# Patient Record
Sex: FEMALE | Race: BLACK OR AFRICAN AMERICAN | NOT HISPANIC OR LATINO | Employment: PART TIME | URBAN - METROPOLITAN AREA
[De-identification: names, ages, dates, MRNs, and addresses within clinical notes are randomized per-mention and may not be internally consistent; named-entity substitution may affect disease eponyms.]

---

## 2017-02-24 ENCOUNTER — HOSPITAL ENCOUNTER (EMERGENCY)
Facility: HOSPITAL | Age: 42
Discharge: HOME | End: 2017-02-24
Attending: EMERGENCY MEDICINE
Payer: MEDICARE

## 2017-02-24 VITALS
HEART RATE: 85 BPM | HEIGHT: 72 IN | TEMPERATURE: 99 F | OXYGEN SATURATION: 100 % | SYSTOLIC BLOOD PRESSURE: 139 MMHG | DIASTOLIC BLOOD PRESSURE: 77 MMHG | WEIGHT: 293 LBS | BODY MASS INDEX: 39.68 KG/M2 | RESPIRATION RATE: 20 BRPM

## 2017-02-24 DIAGNOSIS — S13.4XXA WHIPLASH INJURIES, INITIAL ENCOUNTER: Primary | ICD-10-CM

## 2017-02-24 LAB
B-HCG UR QL: NEGATIVE
CTP QC/QA: YES

## 2017-02-24 PROCEDURE — 81025 URINE PREGNANCY TEST: CPT | Performed by: EMERGENCY MEDICINE

## 2017-02-24 PROCEDURE — 25000003 PHARM REV CODE 250: Performed by: EMERGENCY MEDICINE

## 2017-02-24 PROCEDURE — 99283 EMERGENCY DEPT VISIT LOW MDM: CPT | Mod: 25

## 2017-02-24 RX ORDER — METHOCARBAMOL 750 MG/1
1500 TABLET, FILM COATED ORAL 3 TIMES DAILY
Qty: 30 TABLET | Refills: 0 | Status: SHIPPED | OUTPATIENT
Start: 2017-02-24 | End: 2017-03-01

## 2017-02-24 RX ORDER — IBUPROFEN 800 MG/1
800 TABLET ORAL EVERY 6 HOURS PRN
Qty: 20 TABLET | Refills: 0 | Status: SHIPPED | OUTPATIENT
Start: 2017-02-24 | End: 2018-01-08 | Stop reason: ALTCHOICE

## 2017-02-24 RX ORDER — IBUPROFEN 400 MG/1
800 TABLET ORAL
Status: COMPLETED | OUTPATIENT
Start: 2017-02-24 | End: 2017-02-24

## 2017-02-24 RX ADMIN — IBUPROFEN 800 MG: 400 TABLET, FILM COATED ORAL at 05:02
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
## 2017-03-13 ENCOUNTER — HOSPITAL ENCOUNTER (OUTPATIENT)
Dept: RADIOLOGY | Facility: HOSPITAL | Age: 42
Discharge: HOME | End: 2017-03-13
Attending: OBSTETRICS & GYNECOLOGY
Payer: MEDICARE

## 2017-03-13 ENCOUNTER — OFFICE VISIT (OUTPATIENT)
Dept: OBSTETRICS AND GYNECOLOGY | Facility: CLINIC | Age: 42
End: 2017-03-13
Payer: MEDICARE

## 2017-03-13 VITALS
DIASTOLIC BLOOD PRESSURE: 78 MMHG | SYSTOLIC BLOOD PRESSURE: 120 MMHG | WEIGHT: 293 LBS | BODY MASS INDEX: 39.68 KG/M2 | HEIGHT: 72 IN

## 2017-03-13 DIAGNOSIS — Z12.31 VISIT FOR SCREENING MAMMOGRAM: ICD-10-CM

## 2017-03-13 DIAGNOSIS — Z01.419 ROUTINE GYNECOLOGICAL EXAMINATION: Primary | ICD-10-CM

## 2017-03-13 DIAGNOSIS — Z01.419 ROUTINE GYNECOLOGICAL EXAMINATION: ICD-10-CM

## 2017-03-13 PROCEDURE — 3074F SYST BP LT 130 MM HG: CPT | Mod: S$GLB,,, | Performed by: OBSTETRICS & GYNECOLOGY

## 2017-03-13 PROCEDURE — 87591 N.GONORRHOEAE DNA AMP PROB: CPT

## 2017-03-13 PROCEDURE — 77067 SCR MAMMO BI INCL CAD: CPT | Mod: 26,,, | Performed by: RADIOLOGY

## 2017-03-13 PROCEDURE — 99396 PREV VISIT EST AGE 40-64: CPT | Mod: S$GLB,,, | Performed by: OBSTETRICS & GYNECOLOGY

## 2017-03-13 PROCEDURE — 99999 PR PBB SHADOW E&M-EST. PATIENT-LVL II: CPT | Mod: PBBFAC,,, | Performed by: OBSTETRICS & GYNECOLOGY

## 2017-03-13 PROCEDURE — 77067 SCR MAMMO BI INCL CAD: CPT | Mod: TC

## 2017-03-13 PROCEDURE — 3078F DIAST BP <80 MM HG: CPT | Mod: S$GLB,,, | Performed by: OBSTETRICS & GYNECOLOGY

## 2017-03-13 NOTE — MR AVS SNAPSHOT
[facility]  [address]  5th [address] [facility], [address]  [place] [address]  Phone: [phone]                  Leticia Law   3/13/2017 8:15 AM   Office Visit    Description:  Female : 1975   Provider:  Kelsey Kwon MD   Department:  [facility]           Diagnoses this Visit        Comments    Routine gynecological examination    -  Primary     Visit for screening mammogram                To Do List           Future Appointments        Provider Department Dept Phone    3/13/2017 10:30 AM APPOINTMENT LAB, [facility] [phone]      Goals (5 Years of Data)     None      [facility]     [facility] Care [facility] -  Assistance  Registered nurses in the [facility] provide clinical advisement, health education, appointment booking, and other advisory services.  Call for this free service at [phone].             Medications           Message regarding Medications     Verify the changes and/or additions to your medication regime listed below are the same as discussed with your clinician today.  If any of these changes or additions are incorrect, please notify your healthcare provider.             Verify that the below list of medications is an accurate representation of the medications you are currently taking.  If none reported, the list may be blank. If incorrect, please contact your healthcare provider. Carry this list with you in case of emergency.           Current Medications     ibuprofen (ADVIL,MOTRIN) 800 MG tablet Take 1 tablet (800 mg total) by mouth every 6 (six) hours as needed for Pain.    metformin (FORTAMET) 500 mg 24 hr tablet Take 500 mg by mouth 2 (two) times daily with meals.           Clinical Reference Information           Your Vitals Were     BP Height Weight Last Period BMI    120/78 6' (1.829 m) 174.3 kg (384 lb 4.2 oz) 2017 52.12 kg/m2      Blood Pressure          Most Recent Value     BP  120/78      Allergies as of 3/13/2017     No Known Allergies      Immunizations Administered on Date of Encounter - 3/13/2017     None      Orders Placed During Today's Visit      Normal Orders This Visit    C. trachomatis/N. gonorrhoeae by AMP DNA Cervix     Future Labs/Procedures Expected by Expires    HIV-1 and HIV-2 antibodies  3/13/2017 3/13/2018    Mammo Digital Screening Bilat with Tomosynthesis CAD  3/13/2017 5/12/2018      [facility] Sign-Up     Activating your [facility] account is as easy as 1-2-3!     1) Visit [email or web address], select Sign Up Now, enter this activation code and your date of birth, then select Next.  [email or web address]  Expires: 4/10/2017  5:56 PM      2) Create a username and password to use when you visit [facility] in the future and select a security question in case you lose your password and select Next.    3) Enter your e-mail address and click Sign Up!    Additional Information  If you have questions, please e-mail [email or web address] or call [phone] to talk to our [facility] staff. Remember, [facility] is NOT to be used for urgent needs. For medical emergencies, dial 911.         Language Assistance Services     ATTENTION: Language assistance services are available, free of charge. Please call [phone].      ATENCIÓN: Si habla español, tiene a araujo disposición servicios gratuitos de asistencia lingüística. Llame al [phone].     CHÚ Ý: N?u b?n nói Ti?ng Vi?t, có các d?ch v? h? tr? ngôn ng? mi?n phí dành cho b?n. G?i s? [phone].         [facility] complies with applicable Federal civil rights laws and does not discriminate on the basis of race, color, national origin, age, disability, or sex.

## 2017-03-13 NOTE — PROGRESS NOTES
43 yo female who presents for routine gyn visit.  Complains of pain in her RLQ when she gets up in the am.  Hard to get out of bed.  Pain goes away by the end of the day.  Reg cycles q month. No complaints.  Reports new sex partner, one male partner, +condom use - since her last visit. Desires STD testing today.  PMH significant for DM, HTN, possible thyroid problem, and high cholesterol. Patient on medications for all - but does not remember the names or doses of the medications.    ROS:  GENERAL: Denies weight gain or weight loss. Feeling well overall.   SKIN: Denies rash or lesions.   HEAD: Denies head injury or headache.   CHEST: Denies chest pain or shortness of breath.   CARDIOVASCULAR: Denies palpitations or left sided chest pain.   ABDOMEN: No abdominal pain, constipation, diarrhea, nausea, vomiting or rectal bleeding.   URINARY: No frequency, dysuria, hematuria, or burning on urination.  REPRODUCTIVE: See HPI.   BREASTS:  denies pain, lumps, or nipple discharge.   HEMATOLOGIC: No easy bruisability or excessive bleeding.   MUSCULOSKELETAL: Denies joint pain or swelling.   NEUROLOGIC: Denies syncope or weakness.   PSYCHIATRIC: Denies depression, anxiety or mood swings.         PE:   /78  Ht 6' (1.829 m)  Wt (!) 174.3 kg (384 lb 4.2 oz)  LMP 02/17/2017  BMI 52.12 kg/m2  APPEARANCE: Well nourished, well developed, in no acute distress.  SKIN: Normal skin turgor, no lesions.  CHEST: Lungs clear to auscultation.  HEART: Regular rate and rhythm, no murmurs, rubs or gallops.  ABDOMEN: Soft. No tenderness or masses. No hepatosplenomegaly. No hernias. Obese  BREASTS: Symmetrical, no skin changes or visible lesions. No palpable masses, nipple discharge or adenopathy bilaterally.  PELVIC: Normal external female genitalia without lesions. Normal hair distribution. Adequate perineal body, normal urethral meatus. Vagina moist and well rugated without lesions or discharge. Cervix pink and without lesions. No  significant cystocele or rectocele. Bimanual exam showed uterus normal size, shape, position, mobile and nontender. Adnexa without masses or tenderness. Urethra and bladder normal.  EXTREMITIES: No clubbing cyanosis or edema.      AP  1) Routine gyn  -s/p normal breast exam: mammogram ordered  -pap and HPV from 2015 wnl  -STD testing. Gc/chl and HIV ordered  -contraception: s/p BTL    Patient encouraged to bring list of medications at her next visit so that we can update her chart.  DAVE Kwon MD

## 2017-03-14 LAB
C TRACH DNA SPEC QL NAA+PROBE: NOT DETECTED
N GONORRHOEA DNA SPEC QL NAA+PROBE: NOT DETECTED

## 2018-01-08 ENCOUNTER — HOSPITAL ENCOUNTER (EMERGENCY)
Facility: HOSPITAL | Age: 43
Discharge: HOME | End: 2018-01-08
Attending: EMERGENCY MEDICINE
Payer: MEDICARE

## 2018-01-08 VITALS
DIASTOLIC BLOOD PRESSURE: 77 MMHG | BODY MASS INDEX: 39.68 KG/M2 | HEIGHT: 72 IN | RESPIRATION RATE: 18 BRPM | OXYGEN SATURATION: 98 % | WEIGHT: 293 LBS | TEMPERATURE: 98 F | SYSTOLIC BLOOD PRESSURE: 154 MMHG | HEART RATE: 83 BPM

## 2018-01-08 DIAGNOSIS — M25.552 LEFT HIP PAIN: Primary | ICD-10-CM

## 2018-01-08 PROCEDURE — 96372 THER/PROPH/DIAG INJ SC/IM: CPT

## 2018-01-08 PROCEDURE — 63600175 PHARM REV CODE 636 W HCPCS: Performed by: EMERGENCY MEDICINE

## 2018-01-08 PROCEDURE — 99283 EMERGENCY DEPT VISIT LOW MDM: CPT | Mod: 25

## 2018-01-08 RX ORDER — KETOROLAC TROMETHAMINE 30 MG/ML
30 INJECTION, SOLUTION INTRAMUSCULAR; INTRAVENOUS
Status: COMPLETED | OUTPATIENT
Start: 2018-01-08 | End: 2018-01-08

## 2018-01-08 RX ORDER — NAPROXEN 500 MG/1
500 TABLET ORAL 2 TIMES DAILY PRN
Qty: 30 TABLET | Refills: 0 | Status: SHIPPED | OUTPATIENT
Start: 2018-01-08 | End: 2018-04-04

## 2018-01-08 RX ADMIN — KETOROLAC TROMETHAMINE 30 MG: 30 INJECTION, SOLUTION INTRAMUSCULAR at 07:01

## 2018-01-08 NOTE — ED PROVIDER NOTES
Encounter Date: 2018    SCRIBE #1 NOTE: I, Grace Alicea, am scribing for, and in the presence of,  Dr. Cavanaugh. I have scribed the entire note.     I, Dr. Monik Cavanaugh MD, personally performed the services described in this documentation. All medical record entries made by the scribe were at my direction and in my presence.  I have reviewed the chart and agree that the record reflects my personal performance and is accurate and complete. Monik Cavanaugh MD.    History     Chief Complaint   Patient presents with    Hip Pain     left hip pain X 1 week. States pain is worse with walking or lying on left side.      CHIEF COMPLAINT: Patient presents with:  Hip Pain: left hip pain X 1 week. States pain is worse with walking or lying on left side.       HISTORY OF PRESENT ILLNESS: Leticia Law who is a 42 y.o. presents to the emergency department today with complaint of left hip pain for a week. Pt states that pain was intermittent at first but has become constant for the last few days. Pain is worse with standing or walking. Pain does not radiate. She denies any recent heavy lifting. She denies any shortness of breath, chest pain, fever, nausea, vomiting or changes in urination or BM.     ALLERGIES REVIEWED  MEDICATIONS REVIEWED  PMH/PSH/SOC/FH REVIEWED     The history is provided by the patient.    Nursing/Ancillary staff note reviewed.          The history is provided by the patient.     Review of patient's allergies indicates:  No Known Allergies  Past Medical History:   Diagnosis Date    Depression     Diabetes mellitus     Hyperlipidemia     Hypertension     Thyroid disease     possible hypthyroid disease     Past Surgical History:   Procedure Laterality Date     SECTION      TUBAL LIGATION       No family history on file.  Social History   Substance Use Topics    Smoking status: Never Smoker    Smokeless tobacco: Not on file    Alcohol use No     Review of Systems   Constitutional: Negative  for activity change, chills, diaphoresis and fever.   HENT: Negative for congestion, ear discharge, facial swelling, rhinorrhea, sinus pain, sore throat, trouble swallowing and voice change.    Eyes: Negative for pain, discharge and visual disturbance.   Respiratory: Negative for cough, chest tightness, shortness of breath and wheezing.    Cardiovascular: Negative for chest pain, palpitations and leg swelling.   Gastrointestinal: Negative for abdominal pain, constipation, diarrhea and vomiting.   Genitourinary: Negative for flank pain, frequency and urgency.   Musculoskeletal: Positive for arthralgias. Negative for joint swelling, neck pain and neck stiffness.   Skin: Negative for color change and pallor.   Neurological: Negative for dizziness, weakness, light-headedness and headaches.   All other systems reviewed and are negative.      Physical Exam     Initial Vitals [01/08/18 0512]   BP Pulse Resp Temp SpO2   (!) 157/79 88 15 97.9 °F (36.6 °C) 96 %      MAP       105         Physical Exam    Nursing note and vitals reviewed.  Constitutional: She appears well-developed and well-nourished. She is not diaphoretic. She is Obese . No distress.   HENT:   Head: Normocephalic and atraumatic.   Right Ear: External ear normal.   Left Ear: External ear normal.   Nose: Nose normal.   Mouth/Throat: Oropharynx is clear and moist. No oropharyngeal exudate.   Eyes: Conjunctivae and EOM are normal. Pupils are equal, round, and reactive to light. Right eye exhibits no discharge. Left eye exhibits no discharge.   Neck: Normal range of motion. Neck supple.   Cardiovascular: Normal rate, regular rhythm, normal heart sounds and intact distal pulses. Exam reveals no gallop and no friction rub.    No murmur heard.  Pulmonary/Chest: Breath sounds normal. No stridor. No respiratory distress. She has no wheezes. She has no rales. She exhibits no tenderness.   Abdominal: Soft. Bowel sounds are normal. She exhibits no distension and no mass.  There is no tenderness. There is no rebound and no guarding.   Musculoskeletal: Normal range of motion. She exhibits no edema or tenderness.   Lymphadenopathy:     She has no cervical adenopathy.   Neurological: She is alert and oriented to person, place, and time. She has normal strength. No cranial nerve deficit.   Skin: Skin is warm and dry. Capillary refill takes less than 2 seconds. No rash noted. No erythema. No pallor.   Psychiatric: She has a normal mood and affect. Thought content normal.         ED Course   Procedures  Labs Reviewed - No data to display          Medical Decision Making:   History:   Old Medical Records: I decided to obtain old medical records.  Differential Diagnosis:   Strain, sprain, fracture, dislocation.   ED Management:  42 y.o. Female presents to the ED with non traumatic left hip pain, worse with weather change and movement, likely to be arthritis, low suspicion of infectious etilolgy. Pt was able to walk around the ED room, not likely to be fracture. Will treat pain with anti-inflammatory and instruct pt to follow up with PCP in pain does not subside. After taking into careful account the historical factors and physical exam findings of the patient's presentation today, in conjunction with the empirical and objective data obtained on ED workup, no acute emergent medical condition has been identified. The patient appears to be low risk for an emergent medical condition and I feel it is safe and appropriate at this time for the patient to be discharged to follow-up as detailed in their discharge instructions for reevaluation and possible continued outpatient workup and management. I have discussed the specifics of the workup with the patient and the patient has verbalized understanding of the details of the workup, the diagnosis, the treatment plan, and the need for outpatient follow-up.  Although the patient has no emergent etiology today this does not preclude the development of an  emergent condition so in addition, I have advised the patient that they can return to the ED and/or activate EMS at any time with worsening of their symptoms, change of their symptoms, or with any other medical complaint.  The patient remained comfortable and stable during their visit in the ED.  Discharge and follow-up instructions discussed with the patient who expressed understanding and willingness to comply with my recommendations.                   ED Course    Patient improved with treatment in the emergency department and comfortable going home. Discussed reasons to return and importance of followup.  Patient understands that the emergency visit today is primarily to address immediate concerns and to rule out emergent cause of symptoms and that they may require further workup and evaluation as an outpatient. All questions addressed and patient given discharge instructions and followup information.     Clinical Impression:   The encounter diagnosis was Left hip pain.    Disposition:   Disposition: Discharged  Condition: Stable                        Monik Cavanaugh MD  02/02/18 1049

## 2018-01-08 NOTE — ED TRIAGE NOTES
Pt reports left hip pain that worsens with weigh bearing and activity x 1 week. No acute distress noted. Pt ambulatory with steady gait to room. Pt reports walking slower than normal due to discomfort in left hip. Pt deneis injury at this time.

## 2018-03-22 ENCOUNTER — TELEPHONE (OUTPATIENT)
Dept: OBSTETRICS AND GYNECOLOGY | Facility: CLINIC | Age: 43
End: 2018-03-22

## 2018-03-22 NOTE — TELEPHONE ENCOUNTER
Spoke with pt. And told her she will get her mammogram order at her annual visit on 04/2018, she said okay.

## 2018-04-04 ENCOUNTER — OFFICE VISIT (OUTPATIENT)
Dept: OBSTETRICS AND GYNECOLOGY | Facility: CLINIC | Age: 43
End: 2018-04-04
Payer: MEDICARE

## 2018-04-04 ENCOUNTER — LAB VISIT (OUTPATIENT)
Dept: LAB | Facility: HOSPITAL | Age: 43
End: 2018-04-04
Attending: OBSTETRICS & GYNECOLOGY
Payer: MEDICARE

## 2018-04-04 VITALS
HEIGHT: 72 IN | SYSTOLIC BLOOD PRESSURE: 136 MMHG | DIASTOLIC BLOOD PRESSURE: 78 MMHG | WEIGHT: 293 LBS | BODY MASS INDEX: 39.68 KG/M2

## 2018-04-04 DIAGNOSIS — Z01.419 ENCOUNTER FOR GYNECOLOGICAL EXAMINATION WITHOUT ABNORMAL FINDING: Primary | ICD-10-CM

## 2018-04-04 DIAGNOSIS — Z01.419 ENCOUNTER FOR GYNECOLOGICAL EXAMINATION WITHOUT ABNORMAL FINDING: ICD-10-CM

## 2018-04-04 DIAGNOSIS — Z12.39 SCREENING BREAST EXAMINATION: ICD-10-CM

## 2018-04-04 DIAGNOSIS — Z12.4 CERVICAL CANCER SCREENING: ICD-10-CM

## 2018-04-04 PROCEDURE — G0101 CA SCREEN;PELVIC/BREAST EXAM: HCPCS | Mod: S$GLB,,, | Performed by: OBSTETRICS & GYNECOLOGY

## 2018-04-04 PROCEDURE — 88142 CYTOPATH C/V THIN LAYER: CPT

## 2018-04-04 PROCEDURE — 87624 HPV HI-RISK TYP POOLED RSLT: CPT

## 2018-04-04 PROCEDURE — 99999 PR PBB SHADOW E&M-EST. PATIENT-LVL III: CPT | Mod: PBBFAC,,, | Performed by: OBSTETRICS & GYNECOLOGY

## 2018-04-04 PROCEDURE — 36415 COLL VENOUS BLD VENIPUNCTURE: CPT

## 2018-04-04 PROCEDURE — 87491 CHLMYD TRACH DNA AMP PROBE: CPT

## 2018-04-04 PROCEDURE — 86703 HIV-1/HIV-2 1 RESULT ANTBDY: CPT

## 2018-04-04 NOTE — MEDICAL/APP STUDENT
44 yo  female presenting for routine gyn visit.  Patient has no gyn complaints at this time. Cycles come q month and last about 3-4 days.  Patient is s/p BTL and is interested in reversing the BTL in order to conceive.  Patient is sexually active with one male partner without condom use. Patient requests chl/pillo and HIV testing.  Patient had pap test with HPV cotesting 11/3/15 wnl. Patient requests pap test today.  Patient last had mammogram 3/13/17 which was negative and will have another mammogram at today's visit.    ROS:  GENERAL: Denies weight gain or weight loss. Feeling well overall.   SKIN: Denies rash or lesions.   HEAD: Denies head injury or headache.   CHEST: Denies chest pain or shortness of breath.   CARDIOVASCULAR: Denies palpitations or left sided chest pain.   ABDOMEN: No abdominal pain, constipation, diarrhea, nausea, vomiting or rectal bleeding.   URINARY: No frequency, dysuria, hematuria, or burning on urination.  REPRODUCTIVE: See HPI.   BREASTS: Denies pain, lumps, or nipple discharge.   HEMATOLOGIC: No easy bruisability or excessive bleeding.   MUSCULOSKELETAL: Positive for bilateral knee pain.  NEUROLOGIC: Denies syncope or weakness.   PSYCHIATRIC: Denies depression, anxiety or mood swings.     PE:   Vitals: /78 (BP Method: Large (Manual))   Ht 6' (1.829 m)   Wt (!) 176.7 kg (389 lb 8.9 oz)   LMP 2018 (Exact Date)   BMI 52.83 kg/m²   APPEARANCE: Well nourished, well developed, in no acute distress.  SKIN: Normal skin turgor, no lesions.  CHEST: Lungs clear to auscultation.  HEART: Regular rate and rhythm, no murmurs, rubs or gallops.  ABDOMEN: Soft. No tenderness or masses. No hepatosplenomegaly. No hernias.  BREASTS: Symmetrical, no skin changes or visible lesions. No palpable masses, nipple discharge or adenopathy bilaterally.  PELVIC: Normal external female genitalia without lesions. Normal hair distribution. Adequate perineal body, normal urethral meatus. Vagina  moist and well rugated without lesions or discharge. Cervix pink and without lesions. No significant cystocele or rectocele. Bimanual exam showed uterus normal size, shape, position, and mobile. Uterine fundus is tender with pressure applied. Adnexa without masses or tenderness. Urethra and bladder normal.  EXTREMITIES: No clubbing cyanosis or edema.    A/P  42 yo  female presenting for routine gyn visit.    Routine gyn  -s/p normal breast exam   -s/p normal pelvic exam   -Pap and HPV: collected and ordered.  -STD testing: pillo/chl and HIV ordered.  -contraception: s/p BTL but wishes to have this reversed in order to conceive. Information given about options to pursue.    -f/u visit 1 year    Matteo Field, 3rd year medical student

## 2018-04-04 NOTE — PROGRESS NOTES
44 yo  female presenting for routine gyn visit.  Patient has no gyn complaints at this time. Cycles come q month and last about 3-4 days.  Patient is s/p BTL and is interested in reversing the BTL in order to conceive.  Patient is sexually active with one male partner without condom use. Engaged.  Patient requests STD testing today.  Patient had pap test with HPV cotesting 11/3/15 wnl. Patient requests pap test today.  Patient last had mammogram 3/13/17 which was negative and will have another mammogram at today's visit.    ROS:  GENERAL: Denies weight gain or weight loss. Feeling well overall.   SKIN: Denies rash or lesions.   CHEST: Denies chest pain or shortness of breath.   CARDIOVASCULAR: Denies palpitations or left sided chest pain.   ABDOMEN: No abdominal pain, constipation, diarrhea, nausea, vomiting or rectal bleeding.   URINARY: No frequency, dysuria, hematuria, or burning on urination.  REPRODUCTIVE: no complaints  BREASTS: denies pain, lumps, or nipple discharge.   HEMATOLOGIC: No easy bruisability or excessive bleeding.   MUSCULOSKELETAL: Denies joint pain or swelling.   NEUROLOGIC: Denies syncope or weakness.   PSYCHIATRIC: Denies depression, anxiety or mood swings.         PE:   Vitals: /78 (BP Method: Large (Manual))   Ht 6' (1.829 m)   Wt (!) 176.7 kg (389 lb 8.9 oz)   LMP 2018 (Exact Date)   BMI 52.83 kg/m²   APPEARANCE: Well nourished, well developed, in no acute distress.  SKIN: Normal skin turgor, no lesions.  CHEST: Lungs clear to auscultation.  HEART: Regular rate and rhythm, no murmurs, rubs or gallops.  ABDOMEN: Soft. No tenderness or masses. No hepatosplenomegaly. No hernias. Obese.  BREASTS: Symmetrical, no skin changes or visible lesions. No palpable masses, nipple discharge or adenopathy bilaterally.  PELVIC: Normal external female genitalia without lesions. Normal hair distribution. Adequate perineal body, normal urethral meatus. Vagina moist and well rugated  without lesions or discharge. Cervix pink and without lesions. No significant cystocele or rectocele. Bimanual exam showed uterus normal size, shape, position, mobile and nontender. Adnexa without masses or tenderness. Urethra and bladder normal.  EXTREMITIES: No clubbing cyanosis or edema.    AP  Routine gyn  -s/p normal breast exam: mammogram ordered  -s/p normal pelvic exam:   -Pap and HPV: collected  -STD testing: gc/chl and HIV ordered  -contraception: declined, desires fertility - referred to LOUIE Kwon MD

## 2018-04-05 LAB
C TRACH DNA SPEC QL NAA+PROBE: NOT DETECTED
HIV 1+2 AB+HIV1 P24 AG SERPL QL IA: NEGATIVE
N GONORRHOEA DNA SPEC QL NAA+PROBE: NOT DETECTED

## 2018-04-09 ENCOUNTER — HOSPITAL ENCOUNTER (OUTPATIENT)
Dept: RADIOLOGY | Facility: HOSPITAL | Age: 43
Discharge: HOME | End: 2018-04-09
Attending: OBSTETRICS & GYNECOLOGY
Payer: MEDICARE

## 2018-04-09 DIAGNOSIS — Z12.39 SCREENING BREAST EXAMINATION: ICD-10-CM

## 2018-04-09 PROCEDURE — 77067 SCR MAMMO BI INCL CAD: CPT | Mod: TC

## 2018-04-09 PROCEDURE — 77063 BREAST TOMOSYNTHESIS BI: CPT | Mod: 26,,, | Performed by: RADIOLOGY

## 2018-04-09 PROCEDURE — 77067 SCR MAMMO BI INCL CAD: CPT | Mod: 26,,, | Performed by: RADIOLOGY

## 2018-04-11 LAB
HPV16 AG SPEC QL: NEGATIVE
HPV16+18+H RISK 12 DNA CVX-IMP: NEGATIVE
HPV18 DNA SPEC QL NAA+PROBE: NEGATIVE

## 2018-05-07 ENCOUNTER — HOSPITAL ENCOUNTER (EMERGENCY)
Facility: HOSPITAL | Age: 43
Discharge: HOME | End: 2018-05-07
Payer: MEDICARE

## 2018-05-07 VITALS
RESPIRATION RATE: 18 BRPM | SYSTOLIC BLOOD PRESSURE: 133 MMHG | OXYGEN SATURATION: 98 % | TEMPERATURE: 99 F | WEIGHT: 293 LBS | HEART RATE: 97 BPM | BODY MASS INDEX: 52.89 KG/M2 | DIASTOLIC BLOOD PRESSURE: 60 MMHG

## 2018-05-07 DIAGNOSIS — S39.012A LUMBAR STRAIN, INITIAL ENCOUNTER: ICD-10-CM
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
## 2018-05-07 DIAGNOSIS — S13.4XXA INJURY OF NECK, WHIPLASH, INITIAL ENCOUNTER: Primary | ICD-10-CM

## 2018-05-07 PROCEDURE — 99283 EMERGENCY DEPT VISIT LOW MDM: CPT | Mod: 25

## 2018-05-07 PROCEDURE — 96372 THER/PROPH/DIAG INJ SC/IM: CPT

## 2018-05-07 PROCEDURE — 63600175 PHARM REV CODE 636 W HCPCS: Performed by: NURSE PRACTITIONER

## 2018-05-07 RX ORDER — KETOROLAC TROMETHAMINE 30 MG/ML
60 INJECTION, SOLUTION INTRAMUSCULAR; INTRAVENOUS
Status: COMPLETED | OUTPATIENT
Start: 2018-05-07 | End: 2018-05-07

## 2018-05-07 RX ORDER — CYCLOBENZAPRINE HCL 10 MG
10 TABLET ORAL 3 TIMES DAILY PRN
Qty: 15 TABLET | Refills: 0 | Status: SHIPPED | OUTPATIENT
Start: 2018-05-07 | End: 2018-05-12

## 2018-05-07 RX ORDER — ORPHENADRINE CITRATE 30 MG/ML
60 INJECTION INTRAMUSCULAR; INTRAVENOUS
Status: COMPLETED | OUTPATIENT
Start: 2018-05-07 | End: 2018-05-07

## 2018-05-07 RX ORDER — IBUPROFEN 800 MG/1
800 TABLET ORAL 3 TIMES DAILY
Qty: 20 TABLET | Refills: 0 | Status: SHIPPED | OUTPATIENT
Start: 2018-05-07 | End: 2018-09-17

## 2018-05-07 RX ADMIN — ORPHENADRINE CITRATE 60 MG: 30 INJECTION INTRAMUSCULAR; INTRAVENOUS at 07:05

## 2018-05-07 RX ADMIN — KETOROLAC TROMETHAMINE 60 MG: 30 INJECTION, SOLUTION INTRAMUSCULAR at 07:05
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
## 2018-09-17 ENCOUNTER — OFFICE VISIT (OUTPATIENT)
Dept: FAMILY MEDICINE | Facility: HOSPITAL | Age: 43
End: 2018-09-17
Attending: FAMILY MEDICINE
Payer: MEDICARE

## 2018-09-17 ENCOUNTER — LAB VISIT (OUTPATIENT)
Dept: LAB | Facility: HOSPITAL | Age: 43
End: 2018-09-17
Attending: FAMILY MEDICINE
Payer: MEDICARE

## 2018-09-17 VITALS
WEIGHT: 293 LBS | HEIGHT: 72 IN | BODY MASS INDEX: 39.68 KG/M2 | SYSTOLIC BLOOD PRESSURE: 137 MMHG | HEART RATE: 77 BPM | DIASTOLIC BLOOD PRESSURE: 84 MMHG

## 2018-09-17 DIAGNOSIS — E11.9 TYPE 2 DIABETES MELLITUS WITHOUT COMPLICATION, WITHOUT LONG-TERM CURRENT USE OF INSULIN: ICD-10-CM

## 2018-09-17 DIAGNOSIS — M25.561 ACUTE PAIN OF RIGHT KNEE: Primary | ICD-10-CM

## 2018-09-17 DIAGNOSIS — H60.393 OTHER INFECTIVE ACUTE OTITIS EXTERNA OF BOTH EARS: ICD-10-CM

## 2018-09-17 DIAGNOSIS — R53.83 FATIGUE, UNSPECIFIED TYPE: ICD-10-CM

## 2018-09-17 DIAGNOSIS — F33.40 RECURRENT MAJOR DEPRESSIVE DISORDER, IN REMISSION: ICD-10-CM

## 2018-09-17 DIAGNOSIS — F41.0 PANIC ATTACK: ICD-10-CM

## 2018-09-17 DIAGNOSIS — Z23 NEED FOR PROPHYLACTIC VACCINATION AND INOCULATION AGAINST INFLUENZA: ICD-10-CM

## 2018-09-17 LAB
ALBUMIN SERPL BCP-MCNC: 3.3 G/DL
ALP SERPL-CCNC: 85 U/L
ALT SERPL W/O P-5'-P-CCNC: 17 U/L
ANION GAP SERPL CALC-SCNC: 8 MMOL/L
ANISOCYTOSIS BLD QL SMEAR: SLIGHT
AST SERPL-CCNC: 21 U/L
BASOPHILS # BLD AUTO: 0.01 K/UL
BASOPHILS NFR BLD: 0.2 %
BILIRUB SERPL-MCNC: 0.2 MG/DL
BUN SERPL-MCNC: 14 MG/DL
CALCIUM SERPL-MCNC: 9.3 MG/DL
CHLORIDE SERPL-SCNC: 105 MMOL/L
CHOLEST SERPL-MCNC: 191 MG/DL
CHOLEST/HDLC SERPL: 4.1 {RATIO}
CO2 SERPL-SCNC: 25 MMOL/L
CREAT SERPL-MCNC: 0.9 MG/DL
DIFFERENTIAL METHOD: ABNORMAL
EOSINOPHIL # BLD AUTO: 0.2 K/UL
EOSINOPHIL NFR BLD: 4.8 %
ERYTHROCYTE [DISTWIDTH] IN BLOOD BY AUTOMATED COUNT: 16.9 %
EST. GFR  (AFRICAN AMERICAN): >60 ML/MIN/1.73 M^2
EST. GFR  (NON AFRICAN AMERICAN): >60 ML/MIN/1.73 M^2
ESTIMATED AVG GLUCOSE: 126 MG/DL
GLUCOSE SERPL-MCNC: 86 MG/DL
HBA1C MFR BLD HPLC: 6 %
HCT VFR BLD AUTO: 27.8 %
HDLC SERPL-MCNC: 47 MG/DL
HDLC SERPL: 24.6 %
HGB BLD-MCNC: 7.9 G/DL
HYPOCHROMIA BLD QL SMEAR: ABNORMAL
LDLC SERPL CALC-MCNC: 118.4 MG/DL
LYMPHOCYTES # BLD AUTO: 1.4 K/UL
LYMPHOCYTES NFR BLD: 30.5 %
MCH RBC QN AUTO: 19.4 PG
MCHC RBC AUTO-ENTMCNC: 28.4 G/DL
MCV RBC AUTO: 68 FL
MONOCYTES # BLD AUTO: 0.3 K/UL
MONOCYTES NFR BLD: 7.2 %
NEUTROPHILS # BLD AUTO: 2.6 K/UL
NEUTROPHILS NFR BLD: 57.3 %
NONHDLC SERPL-MCNC: 144 MG/DL
PLATELET # BLD AUTO: 286 K/UL
PLATELET BLD QL SMEAR: ABNORMAL
PMV BLD AUTO: 9.2 FL
POTASSIUM SERPL-SCNC: 4.3 MMOL/L
PROT SERPL-MCNC: 7.9 G/DL
RBC # BLD AUTO: 4.07 M/UL
SODIUM SERPL-SCNC: 138 MMOL/L
T4 SERPL-MCNC: 7.7 UG/DL
TRIGL SERPL-MCNC: 128 MG/DL
TSH SERPL DL<=0.005 MIU/L-ACNC: 3.37 UIU/ML
WBC # BLD AUTO: 4.59 K/UL

## 2018-09-17 PROCEDURE — 84436 ASSAY OF TOTAL THYROXINE: CPT

## 2018-09-17 PROCEDURE — 84443 ASSAY THYROID STIM HORMONE: CPT

## 2018-09-17 PROCEDURE — 83036 HEMOGLOBIN GLYCOSYLATED A1C: CPT

## 2018-09-17 PROCEDURE — 90686 IIV4 VACC NO PRSV 0.5 ML IM: CPT

## 2018-09-17 PROCEDURE — 80053 COMPREHEN METABOLIC PANEL: CPT

## 2018-09-17 PROCEDURE — 99214 OFFICE O/P EST MOD 30 MIN: CPT | Mod: 25 | Performed by: STUDENT IN AN ORGANIZED HEALTH CARE EDUCATION/TRAINING PROGRAM

## 2018-09-17 PROCEDURE — 85025 COMPLETE CBC W/AUTO DIFF WBC: CPT

## 2018-09-17 PROCEDURE — 80061 LIPID PANEL: CPT

## 2018-09-17 PROCEDURE — 36415 COLL VENOUS BLD VENIPUNCTURE: CPT

## 2018-09-17 RX ORDER — FLUOXETINE 10 MG/1
10 CAPSULE ORAL DAILY
Qty: 30 CAPSULE | Refills: 11 | Status: SHIPPED | OUTPATIENT
Start: 2018-09-17 | End: 2019-06-18 | Stop reason: SDUPTHER

## 2018-09-17 RX ORDER — NAPROXEN 500 MG/1
500 TABLET ORAL 2 TIMES DAILY
Qty: 60 TABLET | Refills: 1 | Status: SHIPPED | OUTPATIENT
Start: 2018-09-17 | End: 2019-12-14

## 2018-09-17 RX ORDER — PROPRANOLOL HYDROCHLORIDE 10 MG/1
10 TABLET ORAL 3 TIMES DAILY
Qty: 90 TABLET | Refills: 11 | Status: SHIPPED | OUTPATIENT
Start: 2018-09-17 | End: 2019-06-18 | Stop reason: SDUPTHER

## 2018-09-17 RX ORDER — CIPROFLOXACIN 500 MG/1
500 TABLET ORAL EVERY 12 HOURS
Qty: 14 TABLET | Refills: 0 | Status: SHIPPED | OUTPATIENT
Start: 2018-09-17 | End: 2018-09-24
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
## 2018-10-02 ENCOUNTER — OFFICE VISIT (OUTPATIENT)
Dept: OPTOMETRY | Facility: CLINIC | Age: 43
End: 2018-10-02
Payer: MEDICARE

## 2018-10-02 DIAGNOSIS — E11.9 TYPE 2 DIABETES MELLITUS WITHOUT RETINOPATHY: Primary | ICD-10-CM

## 2018-10-02 DIAGNOSIS — H52.7 REFRACTIVE ERROR: ICD-10-CM

## 2018-10-02 PROCEDURE — 99212 OFFICE O/P EST SF 10 MIN: CPT | Mod: PBBFAC,PO | Performed by: OPTOMETRIST

## 2018-10-02 PROCEDURE — 92015 DETERMINE REFRACTIVE STATE: CPT | Mod: ,,, | Performed by: OPTOMETRIST

## 2018-10-02 PROCEDURE — 99999 PR PBB SHADOW E&M-EST. PATIENT-LVL II: CPT | Mod: PBBFAC,,, | Performed by: OPTOMETRIST

## 2018-10-02 PROCEDURE — 92004 COMPRE OPH EXAM NEW PT 1/>: CPT | Mod: S$PBB,,, | Performed by: OPTOMETRIST

## 2018-10-02 NOTE — LETTER
October 3, 2018      Marielle Carter, DO  [address]  [place] [facility] [place] [address]           [place] [facility]  [address]  [place] [address]  Phone: [phone]  Fax: [phone]          Patient: Leticia Law   MR Number: [id number]   YOB: 1975   Date of Visit: 10/2/2018       Dear Dr. Marielle Carter:    Thank you for referring Leticia Law to me for evaluation. Attached you will find relevant portions of my assessment and plan of care.    If you have questions, please do not hesitate to call me. I look forward to following Leticia Law along with you.    Sincerely,    Yandel Ayala, OD    Enclosure  CC:  No Recipients    If you would like to receive this communication electronically, please contact [email or web address] or [phone] to request more information on [employer] Link access.    For providers and/or their staff who would like to refer a patient to [facility], please contact us through our one-stop-shop provider referral line, [facility], at [phone].    If you feel you have received this communication in error or would no longer like to receive these types of communications, please e-mail [email or web address]

## 2018-10-02 NOTE — MEDICAL/APP STUDENT
Pt presents today for annual diabetic exam. Pt reports KADI was 2-3 years ago. Pt reports misplacing glasses and is interested in a new pair for driving. Pt reports having an ear and left eye infection for the past few weeks and has completed her antibiotic treatment.  Pt has appointment with DM doctor tomorrow. Has not yet started treatment with Metformin.    Hemoglobin A1C   Date Value Ref Range Status   09/17/2018 6.0 (H) 4.0 - 5.6 % Final     Comment:     ADA Screening Guidelines:  5.7-6.4%  Consistent with prediabetes  >or=6.5%  Consistent with diabetes  High levels of fetal hemoglobin interfere with the HbA1C  assay. Heterozygous hemoglobin variants (HbS, HgC, etc)do  not significantly interfere with this assay.   However, presence of multiple variants may affect accuracy.

## 2018-10-03 ENCOUNTER — HOSPITAL ENCOUNTER (OUTPATIENT)
Dept: RADIOLOGY | Facility: HOSPITAL | Age: 43
Discharge: HOME | End: 2018-10-03
Attending: INTERNAL MEDICINE
Payer: MEDICARE

## 2018-10-03 DIAGNOSIS — M25.551 RIGHT HIP PAIN: ICD-10-CM

## 2018-10-03 DIAGNOSIS — M25.561 RIGHT KNEE PAIN: Primary | ICD-10-CM

## 2018-10-03 DIAGNOSIS — M25.561 RIGHT KNEE PAIN: ICD-10-CM

## 2018-10-03 PROCEDURE — 73562 X-RAY EXAM OF KNEE 3: CPT | Mod: 26,RT,, | Performed by: RADIOLOGY

## 2018-10-03 PROCEDURE — 73522 X-RAY EXAM HIPS BI 3-4 VIEWS: CPT | Mod: 50,TC,FY

## 2018-10-03 PROCEDURE — 73562 X-RAY EXAM OF KNEE 3: CPT | Mod: TC,FY,RT

## 2018-10-03 PROCEDURE — 73522 X-RAY EXAM HIPS BI 3-4 VIEWS: CPT | Mod: 26,RT,, | Performed by: RADIOLOGY

## 2018-10-03 PROCEDURE — 73522 X-RAY EXAM HIPS BI 3-4 VIEWS: CPT | Mod: 26,LT,, | Performed by: RADIOLOGY

## 2018-10-03 NOTE — PROGRESS NOTES
HPI     Blur ou at dist, x mos, no assoc pain or red, no relief over time,   constant  No allergy eyes  Pt presents today for annual diabetic exam. Pt reports KADI was 2-3 years   ago. Pt reports misplacing glasses and is interested in a new pair for   driving. Pt reports having an ear and left eye infection for the past few   weeks and has completed her antibiotic treatment.  Pt has appointment with DM doctor tomorrow. Has not yet started treatment   with Metformin.          Last edited by Yandel Ayala, OD on 10/2/2018  4:07 PM. (History)            Assessment /Plan     For exam results, see Encounter Report.    Type 2 diabetes mellitus without retinopathy    Refractive error      1. No diabetic retinopathy, no csme. Return in 1 year for dilated eye exam.  2. Spec Rx given. Different lens options discussed with patient. RTC 1 year full exam.

## 2018-10-04 ENCOUNTER — TELEPHONE (OUTPATIENT)
Dept: FAMILY MEDICINE | Facility: HOSPITAL | Age: 43
End: 2018-10-04

## 2018-10-04 NOTE — TELEPHONE ENCOUNTER
----- Message from Milagros Epps sent at 10/2/2018  9:58 AM CDT -----  Patient needs a refill for Metformin sent to [facility] [address]

## 2019-02-17 NOTE — PROGRESS NOTES
Case discussed with resident at time of visit.  I have reviewed and concur with the resident's evaluation, assessment, and plan.  Referred to Dr. Johnson for panic mgmt.

## 2019-03-23 PROCEDURE — 99282 EMERGENCY DEPT VISIT SF MDM: CPT | Mod: ER

## 2019-03-24 ENCOUNTER — HOSPITAL ENCOUNTER (EMERGENCY)
Facility: HOSPITAL | Age: 44
Discharge: HOME | End: 2019-03-24
Attending: EMERGENCY MEDICINE
Payer: MEDICARE

## 2019-03-24 VITALS — BODY MASS INDEX: 39.68 KG/M2 | WEIGHT: 293 LBS | HEIGHT: 72 IN

## 2019-03-24 DIAGNOSIS — M17.12 OSTEOARTHRITIS OF LEFT KNEE, UNSPECIFIED OSTEOARTHRITIS TYPE: Primary | ICD-10-CM

## 2019-03-25 NOTE — ED PROVIDER NOTES
Encounter Date: 3/23/2019       History     Chief Complaint   Patient presents with    Knee Pain     Pt with c/o L knee pain x3 months. Pt unsure if she injured leg, no obvious deformity noted.     Patient currently presents with a chief complaint of pain in the LEFT knee.  This was acquired about 3 months ago as a result of unknown causes.  Patient denies injury.  Patient notes associated symptoms of pain and tenderness.  Denies associated loss of ROM, warmth, or redness.  She does have a history of OA in the R knee.        Review of patient's allergies indicates:  No Known Allergies  Past Medical History:   Diagnosis Date    Depression     Diabetes mellitus     Hyperlipidemia     Hypertension     Thyroid disease     possible hypthyroid disease     Past Surgical History:   Procedure Laterality Date     SECTION      TUBAL LIGATION       History reviewed. No pertinent family history.  Social History     Tobacco Use    Smoking status: Never Smoker    Smokeless tobacco: Never Used   Substance Use Topics    Alcohol use: Yes     Comment: OCC    Drug use: No     Review of Systems   Constitutional: Negative for chills and fever.   HENT: Negative for congestion and rhinorrhea.    Respiratory: Negative for cough, chest tightness, shortness of breath and wheezing.    Cardiovascular: Negative for chest pain, palpitations and leg swelling.   Gastrointestinal: Negative for abdominal pain, constipation, diarrhea, nausea and vomiting.   Genitourinary: Negative for dysuria, frequency, urgency, vaginal bleeding and vaginal discharge.   Skin: Negative for color change and rash.   Allergic/Immunologic: Negative for immunocompromised state.   Neurological: Negative for dizziness, weakness and numbness.   Hematological: Negative for adenopathy. Does not bruise/bleed easily.   All other systems reviewed and are negative.    Physical Exam     Initial Vitals   BP Pulse Resp Temp SpO2   -- -- -- -- --      MAP       --          Please see down time charting for vital signs.    Physical Exam    Nursing note and vitals reviewed.  Constitutional: She appears well-developed and well-nourished. She is not diaphoretic. No distress.   HENT:   Head: Normocephalic and atraumatic.   Cardiovascular: Normal rate, regular rhythm, normal heart sounds and intact distal pulses.   Pulmonary/Chest: Breath sounds normal. No respiratory distress.   Musculoskeletal:        Left knee: She exhibits bony tenderness. She exhibits normal range of motion, no swelling, no effusion and normal patellar mobility. Tenderness found.   Neurological: She is alert and oriented to person, place, and time. She has normal strength.   Skin: Skin is warm and dry.         ED Course   Procedures  Labs Reviewed - No data to display       Imaging Results    None          Medical Decision Making:   ED Management:  All findings were reviewed with the patient/family in detail along with the diagnosis of OA.  I see no indication of an emergent process beyond that addressed during our encounter but have duly counseled the patient/family regarding the need for prompt follow-up as well as the indications that should prompt immediate return to the emergency room should new or worrisome developments occur.  The patient/family communicates understanding of all this information and all remaining questions and concerns were addressed at this time.                          Clinical Impression:       ICD-10-CM ICD-9-CM   1. Osteoarthritis of left knee, unspecified osteoarthritis type M17.12 715.96                                Ra Titus MD  03/25/19 0307

## 2019-06-18 ENCOUNTER — OFFICE VISIT (OUTPATIENT)
Dept: OBSTETRICS AND GYNECOLOGY | Facility: CLINIC | Age: 44
End: 2019-06-18
Payer: MEDICARE

## 2019-06-18 ENCOUNTER — LAB VISIT (OUTPATIENT)
Dept: LAB | Facility: HOSPITAL | Age: 44
End: 2019-06-18
Attending: OBSTETRICS & GYNECOLOGY
Payer: MEDICARE

## 2019-06-18 VITALS — BODY MASS INDEX: 50.98 KG/M2 | SYSTOLIC BLOOD PRESSURE: 148 MMHG | WEIGHT: 293 LBS | DIASTOLIC BLOOD PRESSURE: 100 MMHG

## 2019-06-18 DIAGNOSIS — Z01.419 ENCOUNTER FOR GYNECOLOGICAL EXAMINATION WITHOUT ABNORMAL FINDING: ICD-10-CM

## 2019-06-18 DIAGNOSIS — Z11.4 ENCOUNTER FOR SCREENING FOR HIV: ICD-10-CM

## 2019-06-18 DIAGNOSIS — Z12.4 CERVICAL CANCER SCREENING: ICD-10-CM

## 2019-06-18 DIAGNOSIS — R73.03 PREDIABETES: ICD-10-CM

## 2019-06-18 DIAGNOSIS — N76.0 ACUTE VAGINITIS: ICD-10-CM

## 2019-06-18 DIAGNOSIS — Z01.419 ENCOUNTER FOR GYNECOLOGICAL EXAMINATION WITHOUT ABNORMAL FINDING: Primary | ICD-10-CM

## 2019-06-18 DIAGNOSIS — I10 ESSENTIAL HYPERTENSION: ICD-10-CM

## 2019-06-18 DIAGNOSIS — F41.0 PANIC ATTACK: ICD-10-CM

## 2019-06-18 DIAGNOSIS — Z12.39 SCREENING BREAST EXAMINATION: ICD-10-CM

## 2019-06-18 PROCEDURE — G0101 CA SCREEN;PELVIC/BREAST EXAM: HCPCS | Mod: S$GLB,,, | Performed by: OBSTETRICS & GYNECOLOGY

## 2019-06-18 PROCEDURE — 88141 CYTOPATH C/V INTERPRET: CPT | Mod: ,,, | Performed by: PATHOLOGY

## 2019-06-18 PROCEDURE — 88175 CYTOPATH C/V AUTO FLUID REDO: CPT | Performed by: PATHOLOGY

## 2019-06-18 PROCEDURE — 87491 CHLMYD TRACH DNA AMP PROBE: CPT

## 2019-06-18 PROCEDURE — G0101 PR CA SCREEN;PELVIC/BREAST EXAM: ICD-10-PCS | Mod: S$GLB,,, | Performed by: OBSTETRICS & GYNECOLOGY

## 2019-06-18 PROCEDURE — 36415 COLL VENOUS BLD VENIPUNCTURE: CPT

## 2019-06-18 PROCEDURE — 99999 PR PBB SHADOW E&M-EST. PATIENT-LVL III: ICD-10-PCS | Mod: PBBFAC,,, | Performed by: OBSTETRICS & GYNECOLOGY

## 2019-06-18 PROCEDURE — 87624 HPV HI-RISK TYP POOLED RSLT: CPT

## 2019-06-18 PROCEDURE — 99999 PR PBB SHADOW E&M-EST. PATIENT-LVL III: CPT | Mod: PBBFAC,,, | Performed by: OBSTETRICS & GYNECOLOGY

## 2019-06-18 PROCEDURE — 86703 HIV-1/HIV-2 1 RESULT ANTBDY: CPT

## 2019-06-18 PROCEDURE — 88141 LIQUID-BASED PAP SMEAR, SCREENING: ICD-10-PCS | Mod: ,,, | Performed by: PATHOLOGY

## 2019-06-18 RX ORDER — METFORMIN HYDROCHLORIDE 500 MG/1
500 TABLET ORAL
Qty: 30 TABLET | Refills: 2 | Status: SHIPPED | OUTPATIENT
Start: 2019-06-18 | End: 2019-10-14 | Stop reason: SDUPTHER

## 2019-06-18 RX ORDER — HYDROCHLOROTHIAZIDE 12.5 MG/1
12.5 CAPSULE ORAL DAILY
Qty: 30 CAPSULE | Refills: 2 | Status: SHIPPED | OUTPATIENT
Start: 2019-06-18 | End: 2019-10-14 | Stop reason: SDUPTHER

## 2019-06-18 RX ORDER — PROPRANOLOL HYDROCHLORIDE 10 MG/1
10 TABLET ORAL 3 TIMES DAILY
Qty: 90 TABLET | Refills: 2 | Status: SHIPPED | OUTPATIENT
Start: 2019-06-18 | End: 2021-05-03

## 2019-06-18 RX ORDER — FLUOXETINE 10 MG/1
10 CAPSULE ORAL DAILY
Qty: 30 CAPSULE | Refills: 2 | Status: SHIPPED | OUTPATIENT
Start: 2019-06-18 | End: 2019-10-14 | Stop reason: SDUPTHER

## 2019-06-18 RX ORDER — MELOXICAM 7.5 MG/1
TABLET ORAL
Refills: 0 | COMMUNITY
Start: 2019-03-24 | End: 2019-12-14

## 2019-06-18 NOTE — PROGRESS NOTES
45 yo  female presenting for routine gyn visit.  Patient has no gyn complaints at this time. Cycles come q month and last about 3-4 days.  Patient is s/p BTL and is interested in reversing the BTL in order to conceive. Has not contacted MD about this procedure yet.  Has one new sex partner who is 46 yrs old. He wants children.  Patient requests STD testing today.  Patient desires Pap smear today.  Patient wants mammogram.  Patient scheduled to see PCP at end of 2019. Needs refills on BP meds until that time    ROS:  GENERAL: Positive weight loss. Feeling well overall.   SKIN: concerned about skin acne.   CHEST: Denies chest pain or shortness of breath.   CARDIOVASCULAR: Denies palpitations or left sided chest pain.   ABDOMEN: No abdominal pain, constipation, diarrhea, nausea, vomiting or rectal bleeding.   URINARY: No frequency, dysuria, hematuria, or burning on urination.  REPRODUCTIVE: no complaints  BREASTS: denies pain, lumps, or nipple discharge.   HEMATOLOGIC: No easy bruisability or excessive bleeding.   MUSCULOSKELETAL: Denies joint pain or swelling.   NEUROLOGIC: Denies syncope or weakness.   PSYCHIATRIC: Denies depression, anxiety or mood swings.         PE:   Vitals: BP (!) 148/100   Wt (!) 170.5 kg (375 lb 14.2 oz)   LMP 06/10/2019 (Exact Date)   BMI 50.98 kg/m²   APPEARANCE: Well nourished, well developed, in no acute distress.  SKIN: Normal skin turgor, no lesions.  CHEST: Lungs clear to auscultation.  HEART: Regular rate and rhythm, no murmurs, rubs or gallops.  ABDOMEN: Soft. No tenderness or masses. No hepatosplenomegaly. No hernias. Obese.  BREASTS: Symmetrical, no skin changes or visible lesions. No palpable masses, nipple discharge or adenopathy bilaterally.  PELVIC: Normal external female genitalia without lesions. Normal hair distribution. Adequate perineal body, normal urethral meatus. Vagina moist and well rugated without lesions or discharge. Cervix pink and without lesions.  No significant cystocele or rectocele. Bimanual exam showed uterus normal size, shape, position, mobile and nontender. Adnexa without masses or tenderness. Urethra and bladder normal.  EXTREMITIES: No clubbing cyanosis or edema.    AP  Routine gyn  -s/p normal breast exam: mammogram ordered  -s/p normal pelvic exam:   -Pap and HPV: collected  -STD testing: gc/chl and HIV ordered  -contraception: declined, desires fertility - referred to [facility]  -referred to dermatology.      DAVE Kwon MD

## 2019-06-19 LAB — HIV 1+2 AB+HIV1 P24 AG SERPL QL IA: NEGATIVE

## 2019-06-20 ENCOUNTER — TELEPHONE (OUTPATIENT)
Dept: OBSTETRICS AND GYNECOLOGY | Facility: CLINIC | Age: 44
End: 2019-06-20

## 2019-06-20 LAB
C TRACH DNA SPEC QL NAA+PROBE: NOT DETECTED
N GONORRHOEA DNA SPEC QL NAA+PROBE: NOT DETECTED

## 2019-06-20 NOTE — TELEPHONE ENCOUNTER
----- Message from Kelsey Kwon MD sent at 6/20/2019 12:36 PM CDT -----  Inform the patient that her HIV test is negative.    Dr Kwon

## 2019-06-24 LAB
HPV HR 12 DNA CVX QL NAA+PROBE: NEGATIVE
HPV16 AG SPEC QL: NEGATIVE
HPV18 DNA SPEC QL NAA+PROBE: NEGATIVE

## 2019-10-14 ENCOUNTER — TELEPHONE (OUTPATIENT)
Dept: OBSTETRICS AND GYNECOLOGY | Facility: CLINIC | Age: 44
End: 2019-10-14

## 2019-10-14 DIAGNOSIS — F41.0 PANIC ATTACK: ICD-10-CM

## 2019-10-14 DIAGNOSIS — R73.03 PREDIABETES: ICD-10-CM

## 2019-10-14 DIAGNOSIS — I10 ESSENTIAL HYPERTENSION: ICD-10-CM

## 2019-10-14 RX ORDER — HYDROCHLOROTHIAZIDE 12.5 MG/1
12.5 CAPSULE ORAL DAILY
Qty: 30 CAPSULE | Refills: 0 | Status: SHIPPED | OUTPATIENT
Start: 2019-10-14 | End: 2021-05-03

## 2019-10-14 RX ORDER — METFORMIN HYDROCHLORIDE 500 MG/1
500 TABLET ORAL
Qty: 30 TABLET | Refills: 0 | Status: SHIPPED | OUTPATIENT
Start: 2019-10-14 | End: 2021-05-03

## 2019-10-14 RX ORDER — FLUOXETINE 10 MG/1
10 CAPSULE ORAL DAILY
Qty: 30 CAPSULE | Refills: 0 | Status: SHIPPED | OUTPATIENT
Start: 2019-10-14 | End: 2021-05-03

## 2019-10-14 NOTE — TELEPHONE ENCOUNTER
Ms. Law will need to contact her PCP for her medications in the future.  She was given an Rx in June 2019 to help her until she saw her PCP.  I am not her PCP - so she will need to discuss medications with this MD.  I have sent rx for only 1 month supply of the requested medications.  She will have to discuss refills with her PCP in the future.    Hope all is well.

## 2019-10-14 NOTE — TELEPHONE ENCOUNTER
----- Message from Zeny Keith sent at 10/14/2019  1:07 PM CDT -----  Contact: ARAM PRUITT [[id number]]  [phone]    Patient needs her prescription for her diabetic medicine, depression and water pills and another one she could not remember resent to [facility].   She did not pick them up. The pharmacy says the prescriptions have , and they need new prescriptions sent to them.

## 2019-10-14 NOTE — TELEPHONE ENCOUNTER
Called pt and left a voicemail informing pt that 1 month suppy of requested rx were sent in for now but she will need to get in touch with PCP for further refills.

## 2019-11-14 ENCOUNTER — TELEPHONE (OUTPATIENT)
Dept: OBSTETRICS AND GYNECOLOGY | Facility: CLINIC | Age: 44
End: 2019-11-14

## 2019-11-14 NOTE — TELEPHONE ENCOUNTER
----- Message from Ailyn Ace sent at 11/14/2019  1:06 PM [facility] -----  No. [phone]   Patient has an odor with a white creamy discharge.   She would like an appointment soon.

## 2019-11-14 NOTE — TELEPHONE ENCOUNTER
Called pt back and she request an appointment for vaginal odor with a white creamy discharge. Pt states she is off from work on 11/22/2019. Pt was scheduled for 11/22/2019. Pt also requested for daughter to be seen for vaginal odor mrn [id number]. Pt's daughter was scheduled same day with Dr. Monsalve. Pt verbalized understanding.

## 2019-11-19 ENCOUNTER — HOSPITAL ENCOUNTER (EMERGENCY)
Facility: HOSPITAL | Age: 44
Discharge: HOME | End: 2019-11-19
Attending: EMERGENCY MEDICINE
Payer: MEDICARE

## 2019-11-19 VITALS
OXYGEN SATURATION: 98 % | DIASTOLIC BLOOD PRESSURE: 72 MMHG | BODY MASS INDEX: 51.19 KG/M2 | HEART RATE: 89 BPM | RESPIRATION RATE: 17 BRPM | TEMPERATURE: 98 F | WEIGHT: 293 LBS | SYSTOLIC BLOOD PRESSURE: 132 MMHG
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
## 2019-11-19 DIAGNOSIS — M79.671 RIGHT FOOT PAIN: Primary | ICD-10-CM

## 2019-11-19 PROCEDURE — 99283 EMERGENCY DEPT VISIT LOW MDM: CPT | Mod: 25,ER

## 2019-11-19 NOTE — ED PROVIDER NOTES
Encounter Date: 2019       History     Chief Complaint   Patient presents with    Leg Pain     Pt c/o RLE pain X 1 day following slip and fall in detergent on floor in [facility].  No obvious abnormalities noted. Pt noted ambulating without issue.     The history is provided by the patient.   Fall   The accident occurred yesterday. The fall occurred while walking. She landed on a hard floor. The pain is present in the right foot. She was ambulatory at the scene. There was no entrapment after the fall. There was no drug use involved in the accident. There was no alcohol use involved in the accident. Pertinent negatives include no fever, no nausea and no vomiting.     Review of patient's allergies indicates:  No Known Allergies  Past Medical History:   Diagnosis Date    Depression     Diabetes mellitus     Hyperlipidemia     Hypertension     Thyroid disease     possible hypthyroid disease     Past Surgical History:   Procedure Laterality Date     SECTION      TUBAL LIGATION       History reviewed. No pertinent family history.  Social History     Tobacco Use    Smoking status: Never Smoker    Smokeless tobacco: Never Used   Substance Use Topics    Alcohol use: Yes     Comment: OCC    Drug use: No     Review of Systems   Constitutional: Negative for fever.   Gastrointestinal: Negative for nausea and vomiting.   Musculoskeletal: Positive for arthralgias.   All other systems reviewed and are negative.      Physical Exam     Initial Vitals [19 0438]   BP Pulse Resp Temp SpO2   132/72 89 17 98 °F (36.7 °C) 98 %      MAP       --         Physical Exam    Nursing note and vitals reviewed.  Constitutional: She appears well-developed and well-nourished.   HENT:   Head: Normocephalic and atraumatic.   Eyes: Conjunctivae and EOM are normal.   Neck: Normal range of motion. Neck supple.   Cardiovascular: Normal rate, regular rhythm and normal heart sounds.   Pulmonary/Chest: Breath sounds normal. No  respiratory distress. She has no wheezes. She has no rhonchi. She has no rales.   Abdominal: Soft. There is no tenderness. There is no rebound and no guarding.   Musculoskeletal: Normal range of motion.        Feet:    Neurological: She is alert and oriented to person, place, and time. GCS score is 15. GCS eye subscore is 4. GCS verbal subscore is 5. GCS motor subscore is 6.   Skin: Skin is warm and dry. Capillary refill takes less than 2 seconds.   Psychiatric: She has a normal mood and affect. Her behavior is normal. Judgment and thought content normal.         ED Course   Procedures  Labs Reviewed - No data to display       Imaging Results    None          Medical Decision Making:   Clinical Tests:   Radiological Study: Ordered and Reviewed                                 Clinical Impression:       ICD-10-CM ICD-9-CM   1. Right foot pain M79.671 729.5   2. Fall W19.XXXA E888.9         Disposition:   Disposition: Discharged  Condition: Stable                     Nellie Evans MD  12/03/19 6474

## 2019-11-19 NOTE — ED TRIAGE NOTES
"Reports to ED c c/o R leg pain s/t fall. States "I was looking at the small detergent at the top shelf and saw the price so I went and looked at the bigger one and when I went to turn around I slip but I didn't fall. It was like liquid like detergent on the floor." States it radiates up back. No overt deficit noted. Ambulatory to room c steady gait present.   "

## 2019-11-22 ENCOUNTER — OFFICE VISIT (OUTPATIENT)
Dept: OBSTETRICS AND GYNECOLOGY | Facility: CLINIC | Age: 44
End: 2019-11-22
Payer: MEDICARE

## 2019-11-22 VITALS — WEIGHT: 293 LBS | SYSTOLIC BLOOD PRESSURE: 140 MMHG | BODY MASS INDEX: 51.01 KG/M2 | DIASTOLIC BLOOD PRESSURE: 78 MMHG

## 2019-11-22 DIAGNOSIS — N94.89 OTHER SPECIFIED CONDITIONS ASSOCIATED WITH FEMALE GENITAL ORGANS AND MENSTRUAL CYCLE: ICD-10-CM

## 2019-11-22 DIAGNOSIS — R81 GLYCOSURIA: ICD-10-CM

## 2019-11-22 DIAGNOSIS — N89.8 VAGINAL DISCHARGE: Primary | ICD-10-CM

## 2019-11-22 PROCEDURE — 3008F BODY MASS INDEX DOCD: CPT | Mod: CPTII,S$GLB,, | Performed by: OBSTETRICS & GYNECOLOGY

## 2019-11-22 PROCEDURE — 99213 PR OFFICE/OUTPT VISIT, EST, LEVL III, 20-29 MIN: ICD-10-PCS | Mod: S$GLB,,, | Performed by: OBSTETRICS & GYNECOLOGY

## 2019-11-22 PROCEDURE — 87801 DETECT AGNT MULT DNA AMPLI: CPT

## 2019-11-22 PROCEDURE — 99999 PR PBB SHADOW E&M-EST. PATIENT-LVL II: CPT | Mod: PBBFAC,,, | Performed by: OBSTETRICS & GYNECOLOGY

## 2019-11-22 PROCEDURE — 3008F PR BODY MASS INDEX (BMI) DOCUMENTED: ICD-10-PCS | Mod: CPTII,S$GLB,, | Performed by: OBSTETRICS & GYNECOLOGY

## 2019-11-22 PROCEDURE — 87491 CHLMYD TRACH DNA AMP PROBE: CPT | Mod: 59

## 2019-11-22 PROCEDURE — 87086 URINE CULTURE/COLONY COUNT: CPT

## 2019-11-22 PROCEDURE — 3077F PR MOST RECENT SYSTOLIC BLOOD PRESSURE >= 140 MM HG: ICD-10-PCS | Mod: CPTII,S$GLB,, | Performed by: OBSTETRICS & GYNECOLOGY

## 2019-11-22 PROCEDURE — 3078F DIAST BP <80 MM HG: CPT | Mod: CPTII,S$GLB,, | Performed by: OBSTETRICS & GYNECOLOGY

## 2019-11-22 PROCEDURE — 3077F SYST BP >= 140 MM HG: CPT | Mod: CPTII,S$GLB,, | Performed by: OBSTETRICS & GYNECOLOGY

## 2019-11-22 PROCEDURE — 99213 OFFICE O/P EST LOW 20 MIN: CPT | Mod: S$GLB,,, | Performed by: OBSTETRICS & GYNECOLOGY

## 2019-11-22 PROCEDURE — 3078F PR MOST RECENT DIASTOLIC BLOOD PRESSURE < 80 MM HG: ICD-10-PCS | Mod: CPTII,S$GLB,, | Performed by: OBSTETRICS & GYNECOLOGY

## 2019-11-22 PROCEDURE — 99999 PR PBB SHADOW E&M-EST. PATIENT-LVL II: ICD-10-PCS | Mod: PBBFAC,,, | Performed by: OBSTETRICS & GYNECOLOGY

## 2019-11-22 PROCEDURE — 87481 CANDIDA DNA AMP PROBE: CPT | Mod: 59

## 2019-11-22 RX ORDER — METRONIDAZOLE 500 MG/1
500 TABLET ORAL EVERY 12 HOURS
Qty: 14 TABLET | Refills: 0 | Status: SHIPPED | OUTPATIENT
Start: 2019-11-22 | End: 2019-11-29

## 2019-11-22 RX ORDER — FLUCONAZOLE 150 MG/1
TABLET ORAL
Qty: 2 TABLET | Refills: 0 | OUTPATIENT
Start: 2019-11-22 | End: 2021-05-27

## 2019-11-22 NOTE — PROGRESS NOTES
Subjective:       Patient ID: Leticia Law is a 44 y.o. female.    Chief Complaint:  Vaginal Discharge      History of Present Illness  45yo  c/o vaginal discharge and itching for the past week.  Patient of Dr. Kwon's.  H/o BTL.  Cycles regular, monthly.  Of note, she is also requesting pain medication after recent fall.  Seen in ER on .  C/o pain along right leg and back.  Unable to see PCP until January.     GYN & OB History  Patient's last menstrual period was 10/31/2019 (approximate).   Date of Last Pap: 2019    OB History    Para Term  AB Living   5 3 0   2 3   SAB TAB Ectopic Multiple Live Births   2       3      # Outcome Date GA Lbr Cyrus/2nd Weight Sex Delivery Anes PTL Lv   5 Para 08    M Vag-Spont   CHRISTY   4 Para 05    F Vag-Spont   CHRISTY   3 Para 03    F Vag-Spont   CHRISTY   2 SAB            1 SAB                Review of Systems  Review of Systems: neg x 10, except as per HPI        Objective:    Physical Exam     BP (!) 140/78   Wt (!) 170.6 kg (376 lb 1.7 oz)   LMP 10/31/2019 (Approximate)   BMI 51.01 kg/m²     Gen: NAD  Resp: Normal respiratory effort  Abd: soft, NT, obese  Pelvic: Normal-appearing external female genitalia.  Thin white vaginal discharge noted.  Cx taken.  No CMT.  Uterus small, mobile, nontender.  No adnexal masses or tenderness.   Ext: normal ROM, normal Gait.    Psych: appropriate affect  Neuro: grossly intact    Assessment:        1. Vaginal discharge    2. Glycosuria     3. Other specified conditions associated with female genital organs and menstrual cycle               Plan:      1.  Will treat empirically for BV and yeast, cx pending.  2.  Discussed with patient I cannot rx pain medication for non-gyn reasons.  Recommend NSAIDs and heating pad prn.  F/u with PCP as soon as possible.  If pain worsens, she should return to ER.  3.  Counseling done, precautions given, all questions answered.  RTC prn with Dr. Kwon.

## 2019-11-24 LAB
BACTERIA UR CULT: NORMAL
BACTERIA UR CULT: NORMAL

## 2019-11-25 ENCOUNTER — TELEPHONE (OUTPATIENT)
Dept: OBSTETRICS AND GYNECOLOGY | Facility: CLINIC | Age: 44
End: 2019-11-25

## 2019-11-25 LAB
BACTERIAL VAGINOSIS DNA: POSITIVE
C TRACH DNA SPEC QL NAA+PROBE: NOT DETECTED
CANDIDA GLABRATA DNA: NEGATIVE
CANDIDA KRUSEI DNA: NEGATIVE
CANDIDA RRNA VAG QL PROBE: NEGATIVE
N GONORRHOEA DNA SPEC QL NAA+PROBE: NOT DETECTED
T VAGINALIS RRNA GENITAL QL PROBE: POSITIVE

## 2019-11-25 NOTE — TELEPHONE ENCOUNTER
Please let the patient know her cultures show she has bacterial vaginosis as well as trichomonas.  She is already being treated for BV, but trichomonas is a sexually transmitted disease, so her partner will also need to be treated. The Flagyl she is on for BV will also treat the trichomonas.  Let me know if she has any questions.  Thank you.

## 2019-12-13 ENCOUNTER — HOSPITAL ENCOUNTER (EMERGENCY)
Facility: HOSPITAL | Age: 44
Discharge: HOME | End: 2019-12-14
Attending: EMERGENCY MEDICINE
Payer: MEDICARE

## 2019-12-13 DIAGNOSIS — M25.561 ACUTE PAIN OF RIGHT KNEE: ICD-10-CM
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
## 2019-12-13 PROCEDURE — 99284 EMERGENCY DEPT VISIT MOD MDM: CPT | Mod: ER

## 2019-12-14 VITALS
BODY MASS INDEX: 50.59 KG/M2 | OXYGEN SATURATION: 100 % | DIASTOLIC BLOOD PRESSURE: 76 MMHG | WEIGHT: 293 LBS | HEART RATE: 80 BPM | TEMPERATURE: 98 F | SYSTOLIC BLOOD PRESSURE: 122 MMHG | RESPIRATION RATE: 20 BRPM

## 2019-12-14 RX ORDER — KETOROLAC TROMETHAMINE 10 MG/1
10 TABLET, FILM COATED ORAL EVERY 8 HOURS
Qty: 15 TABLET | Refills: 0 | OUTPATIENT
Start: 2019-12-14 | End: 2021-05-27
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
## 2020-04-22 ENCOUNTER — TELEPHONE (OUTPATIENT)
Dept: OBSTETRICS AND GYNECOLOGY | Facility: CLINIC | Age: 45
End: 2020-04-22

## 2020-04-22 NOTE — TELEPHONE ENCOUNTER
Spoke to pt and she would like to come into office for annual same day as daughter. Pt was scheduled for 6/22/2020. Reminders were sent home.

## 2020-06-22 ENCOUNTER — LAB VISIT (OUTPATIENT)
Dept: LAB | Facility: HOSPITAL | Age: 45
End: 2020-06-22
Attending: OBSTETRICS & GYNECOLOGY
Payer: MEDICARE

## 2020-06-22 ENCOUNTER — OFFICE VISIT (OUTPATIENT)
Dept: OBSTETRICS AND GYNECOLOGY | Facility: CLINIC | Age: 45
End: 2020-06-22
Payer: MEDICARE

## 2020-06-22 VITALS
DIASTOLIC BLOOD PRESSURE: 72 MMHG | SYSTOLIC BLOOD PRESSURE: 120 MMHG | HEIGHT: 72 IN | BODY MASS INDEX: 39.68 KG/M2 | WEIGHT: 293 LBS

## 2020-06-22 DIAGNOSIS — Z11.4 ENCOUNTER FOR SCREENING FOR HIV: ICD-10-CM

## 2020-06-22 DIAGNOSIS — Z01.419 ROUTINE GYNECOLOGICAL EXAMINATION: Primary | ICD-10-CM

## 2020-06-22 DIAGNOSIS — Z12.4 CERVICAL CANCER SCREENING: ICD-10-CM

## 2020-06-22 DIAGNOSIS — Z12.31 VISIT FOR SCREENING MAMMOGRAM: ICD-10-CM

## 2020-06-22 DIAGNOSIS — N76.0 ACUTE VAGINITIS: ICD-10-CM

## 2020-06-22 PROCEDURE — 86703 HIV-1/HIV-2 1 RESULT ANTBDY: CPT

## 2020-06-22 PROCEDURE — 36415 COLL VENOUS BLD VENIPUNCTURE: CPT

## 2020-06-22 PROCEDURE — 3074F PR MOST RECENT SYSTOLIC BLOOD PRESSURE < 130 MM HG: ICD-10-PCS | Mod: CPTII,S$GLB,, | Performed by: OBSTETRICS & GYNECOLOGY

## 2020-06-22 PROCEDURE — 3074F SYST BP LT 130 MM HG: CPT | Mod: CPTII,S$GLB,, | Performed by: OBSTETRICS & GYNECOLOGY

## 2020-06-22 PROCEDURE — 99999 PR PBB SHADOW E&M-EST. PATIENT-LVL III: CPT | Mod: PBBFAC,,, | Performed by: OBSTETRICS & GYNECOLOGY

## 2020-06-22 PROCEDURE — 87624 HPV HI-RISK TYP POOLED RSLT: CPT

## 2020-06-22 PROCEDURE — G0101 CA SCREEN;PELVIC/BREAST EXAM: HCPCS | Mod: S$GLB,,, | Performed by: OBSTETRICS & GYNECOLOGY

## 2020-06-22 PROCEDURE — 3078F DIAST BP <80 MM HG: CPT | Mod: CPTII,S$GLB,, | Performed by: OBSTETRICS & GYNECOLOGY

## 2020-06-22 PROCEDURE — 99999 PR PBB SHADOW E&M-EST. PATIENT-LVL III: ICD-10-PCS | Mod: PBBFAC,,, | Performed by: OBSTETRICS & GYNECOLOGY

## 2020-06-22 PROCEDURE — 88175 CYTOPATH C/V AUTO FLUID REDO: CPT

## 2020-06-22 PROCEDURE — 87491 CHLMYD TRACH DNA AMP PROBE: CPT

## 2020-06-22 PROCEDURE — 3078F PR MOST RECENT DIASTOLIC BLOOD PRESSURE < 80 MM HG: ICD-10-PCS | Mod: CPTII,S$GLB,, | Performed by: OBSTETRICS & GYNECOLOGY

## 2020-06-22 PROCEDURE — G0101 PR CA SCREEN;PELVIC/BREAST EXAM: ICD-10-PCS | Mod: S$GLB,,, | Performed by: OBSTETRICS & GYNECOLOGY

## 2020-06-22 NOTE — PROGRESS NOTES
44 yo  female presenting for routine gyn visit.  Patient has no gyn complaints at this time. Cycles come q month and last about 3-4 days.  Patient is s/p BTL.  Had a new partner that  this year (fell off truck at work).  Patient requests STD testing today.  Patient desires Pap smear today.  Patient wants mammogram.    ROS:  GENERAL: Denies weight gain or weight loss. Feeling well overall.   SKIN: Denies rash or lesions.   CHEST: Denies chest pain or shortness of breath.   CARDIOVASCULAR: Denies palpitations or left sided chest pain.   ABDOMEN: No abdominal pain, constipation, diarrhea, nausea, vomiting or rectal bleeding.   URINARY: No frequency, dysuria, hematuria, or burning on urination.  REPRODUCTIVE: no complaints  BREASTS: denies pain, lumps, or nipple discharge.   HEMATOLOGIC: No easy bruisability or excessive bleeding.   MUSCULOSKELETAL: Denies joint pain or swelling.   NEUROLOGIC: Denies syncope or weakness.   PSYCHIATRIC: Denies depression, anxiety or mood swings.         PE:   Vitals: /72   Ht 6' (1.829 m)   Wt (!) 181.3 kg (399 lb 11.1 oz)   LMP 2020 (Approximate)   BMI 54.21 kg/m²   APPEARANCE: Well nourished, well developed, in no acute distress.  SKIN: Normal skin turgor, no lesions.  CHEST: Lungs clear to auscultation.  HEART: Regular rate and rhythm, no murmurs, rubs or gallops.  ABDOMEN: Soft. No tenderness or masses. No hepatosplenomegaly. No hernias. Obese.  BREASTS: Symmetrical, no skin changes or visible lesions. No palpable masses, nipple discharge or adenopathy bilaterally.  PELVIC: Normal external female genitalia without lesions. Normal hair distribution. Adequate perineal body, normal urethral meatus. Vagina moist and well rugated without lesions or discharge. Cervix pink and without lesions. No significant cystocele or rectocele. Bimanual exam showed uterus normal size, shape, position, mobile and nontender. Adnexa without masses or tenderness. Urethra and  bladder normal.  EXTREMITIES: No clubbing cyanosis or edema.    AP  Routine gyn  -s/p normal breast exam: mammogram ordered  -s/p normal pelvic exam:   -Pap and HPV: collected  -STD testing: gc/chl and HIV ordered  -contraception: s/p BTL    F/u in 1 yr    DAVE Kwon MD

## 2020-06-23 LAB — HIV 1+2 AB+HIV1 P24 AG SERPL QL IA: NEGATIVE

## 2020-06-25 LAB
C TRACH DNA SPEC QL NAA+PROBE: NOT DETECTED
N GONORRHOEA DNA SPEC QL NAA+PROBE: NOT DETECTED

## 2020-06-29 LAB
FINAL PATHOLOGIC DIAGNOSIS: NORMAL
Lab: NORMAL

## 2020-07-06 LAB
HPV HR 12 DNA SPEC QL NAA+PROBE: POSITIVE
HPV16 AG SPEC QL: NEGATIVE
HPV18 DNA SPEC QL NAA+PROBE: NEGATIVE

## 2020-07-08 ENCOUNTER — HOSPITAL ENCOUNTER (OUTPATIENT)
Dept: RADIOLOGY | Facility: HOSPITAL | Age: 45
Discharge: HOME | End: 2020-07-08
Attending: OBSTETRICS & GYNECOLOGY
Payer: MEDICARE

## 2020-07-08 DIAGNOSIS — Z12.31 VISIT FOR SCREENING MAMMOGRAM: ICD-10-CM

## 2020-07-08 PROCEDURE — 77063 BREAST TOMOSYNTHESIS BI: CPT | Mod: 26,,, | Performed by: RADIOLOGY

## 2020-07-08 PROCEDURE — 77067 SCR MAMMO BI INCL CAD: CPT | Mod: TC

## 2020-07-08 PROCEDURE — 77067 SCR MAMMO BI INCL CAD: CPT | Mod: 26,,, | Performed by: RADIOLOGY

## 2020-07-08 PROCEDURE — 77067 MAMMO DIGITAL SCREENING BILAT WITH TOMOSYNTHESIS_CAD: ICD-10-PCS | Mod: 26,,, | Performed by: RADIOLOGY

## 2020-07-08 PROCEDURE — 77063 MAMMO DIGITAL SCREENING BILAT WITH TOMOSYNTHESIS_CAD: ICD-10-PCS | Mod: 26,,, | Performed by: RADIOLOGY

## 2020-08-20 ENCOUNTER — LAB VISIT (OUTPATIENT)
Dept: LAB | Facility: OTHER | Age: 45
End: 2020-08-20
Payer: MEDICARE

## 2020-08-20 DIAGNOSIS — Z03.818 ENCOUNTER FOR OBSERVATION FOR SUSPECTED EXPOSURE TO OTHER BIOLOGICAL AGENTS RULED OUT: ICD-10-CM

## 2020-08-20 PROCEDURE — U0003 INFECTIOUS AGENT DETECTION BY NUCLEIC ACID (DNA OR RNA); SEVERE ACUTE RESPIRATORY SYNDROME CORONAVIRUS 2 (SARS-COV-2) (CORONAVIRUS DISEASE [COVID-19]), AMPLIFIED PROBE TECHNIQUE, MAKING USE OF HIGH THROUGHPUT TECHNOLOGIES AS DESCRIBED BY CMS-2020-01-R: HCPCS

## 2020-08-21 LAB — SARS-COV-2 RNA RESP QL NAA+PROBE: NOT DETECTED

## 2020-08-27 ENCOUNTER — LAB VISIT (OUTPATIENT)
Dept: LAB | Facility: OTHER | Age: 45
End: 2020-08-27
Payer: MEDICARE

## 2020-08-27 DIAGNOSIS — Z03.818 ENCOUNTER FOR OBSERVATION FOR SUSPECTED EXPOSURE TO OTHER BIOLOGICAL AGENTS RULED OUT: ICD-10-CM

## 2020-08-27 PROCEDURE — U0003 INFECTIOUS AGENT DETECTION BY NUCLEIC ACID (DNA OR RNA); SEVERE ACUTE RESPIRATORY SYNDROME CORONAVIRUS 2 (SARS-COV-2) (CORONAVIRUS DISEASE [COVID-19]), AMPLIFIED PROBE TECHNIQUE, MAKING USE OF HIGH THROUGHPUT TECHNOLOGIES AS DESCRIBED BY CMS-2020-01-R: HCPCS

## 2020-08-28 LAB — SARS-COV-2 RNA RESP QL NAA+PROBE: NOT DETECTED

## 2020-09-03 ENCOUNTER — LAB VISIT (OUTPATIENT)
Dept: LAB | Facility: OTHER | Age: 45
End: 2020-09-03
Payer: MEDICARE

## 2020-09-03 DIAGNOSIS — Z03.818 ENCOUNTER FOR OBSERVATION FOR SUSPECTED EXPOSURE TO OTHER BIOLOGICAL AGENTS RULED OUT: ICD-10-CM

## 2020-09-03 PROCEDURE — U0003 INFECTIOUS AGENT DETECTION BY NUCLEIC ACID (DNA OR RNA); SEVERE ACUTE RESPIRATORY SYNDROME CORONAVIRUS 2 (SARS-COV-2) (CORONAVIRUS DISEASE [COVID-19]), AMPLIFIED PROBE TECHNIQUE, MAKING USE OF HIGH THROUGHPUT TECHNOLOGIES AS DESCRIBED BY CMS-2020-01-R: HCPCS

## 2020-09-04 LAB — SARS-COV-2 RNA RESP QL NAA+PROBE: NOT DETECTED

## 2020-09-10 ENCOUNTER — LAB VISIT (OUTPATIENT)
Dept: LAB | Facility: OTHER | Age: 45
End: 2020-09-10
Attending: INTERNAL MEDICINE
Payer: MEDICARE

## 2020-09-10 ENCOUNTER — TELEPHONE (OUTPATIENT)
Dept: BARIATRICS | Facility: CLINIC | Age: 45
End: 2020-09-10

## 2020-09-10 DIAGNOSIS — Z03.818 ENCOUNTER FOR OBSERVATION FOR SUSPECTED EXPOSURE TO OTHER BIOLOGICAL AGENTS RULED OUT: ICD-10-CM

## 2020-09-10 PROCEDURE — U0003 INFECTIOUS AGENT DETECTION BY NUCLEIC ACID (DNA OR RNA); SEVERE ACUTE RESPIRATORY SYNDROME CORONAVIRUS 2 (SARS-COV-2) (CORONAVIRUS DISEASE [COVID-19]), AMPLIFIED PROBE TECHNIQUE, MAKING USE OF HIGH THROUGHPUT TECHNOLOGIES AS DESCRIBED BY CMS-2020-01-R: HCPCS

## 2020-09-11 LAB — SARS-COV-2 RNA RESP QL NAA+PROBE: NOT DETECTED

## 2020-09-17 ENCOUNTER — LAB VISIT (OUTPATIENT)
Dept: LAB | Facility: OTHER | Age: 45
End: 2020-09-17
Attending: INTERNAL MEDICINE
Payer: MEDICARE

## 2020-09-17 DIAGNOSIS — Z03.818 ENCOUNTER FOR OBSERVATION FOR SUSPECTED EXPOSURE TO OTHER BIOLOGICAL AGENTS RULED OUT: ICD-10-CM

## 2020-09-17 LAB — SARS-COV-2 RNA RESP QL NAA+PROBE: NOT DETECTED

## 2020-09-17 PROCEDURE — U0003 INFECTIOUS AGENT DETECTION BY NUCLEIC ACID (DNA OR RNA); SEVERE ACUTE RESPIRATORY SYNDROME CORONAVIRUS 2 (SARS-COV-2) (CORONAVIRUS DISEASE [COVID-19]), AMPLIFIED PROBE TECHNIQUE, MAKING USE OF HIGH THROUGHPUT TECHNOLOGIES AS DESCRIBED BY CMS-2020-01-R: HCPCS

## 2020-09-24 ENCOUNTER — LAB VISIT (OUTPATIENT)
Dept: LAB | Facility: OTHER | Age: 45
End: 2020-09-24
Payer: MEDICARE

## 2020-09-24 DIAGNOSIS — Z03.818 ENCOUNTER FOR OBSERVATION FOR SUSPECTED EXPOSURE TO OTHER BIOLOGICAL AGENTS RULED OUT: ICD-10-CM

## 2020-09-24 LAB — SARS-COV-2 RNA RESP QL NAA+PROBE: NOT DETECTED

## 2020-09-24 PROCEDURE — U0003 INFECTIOUS AGENT DETECTION BY NUCLEIC ACID (DNA OR RNA); SEVERE ACUTE RESPIRATORY SYNDROME CORONAVIRUS 2 (SARS-COV-2) (CORONAVIRUS DISEASE [COVID-19]), AMPLIFIED PROBE TECHNIQUE, MAKING USE OF HIGH THROUGHPUT TECHNOLOGIES AS DESCRIBED BY CMS-2020-01-R: HCPCS

## 2020-10-01 ENCOUNTER — LAB VISIT (OUTPATIENT)
Dept: LAB | Facility: OTHER | Age: 45
End: 2020-10-01
Payer: MEDICARE

## 2020-10-01 DIAGNOSIS — Z03.818 ENCOUNTER FOR OBSERVATION FOR SUSPECTED EXPOSURE TO OTHER BIOLOGICAL AGENTS RULED OUT: ICD-10-CM

## 2020-10-01 PROCEDURE — U0003 INFECTIOUS AGENT DETECTION BY NUCLEIC ACID (DNA OR RNA); SEVERE ACUTE RESPIRATORY SYNDROME CORONAVIRUS 2 (SARS-COV-2) (CORONAVIRUS DISEASE [COVID-19]), AMPLIFIED PROBE TECHNIQUE, MAKING USE OF HIGH THROUGHPUT TECHNOLOGIES AS DESCRIBED BY CMS-2020-01-R: HCPCS

## 2020-10-02 LAB — SARS-COV-2 RNA RESP QL NAA+PROBE: NOT DETECTED

## 2020-10-08 ENCOUNTER — LAB VISIT (OUTPATIENT)
Dept: LAB | Facility: OTHER | Age: 45
End: 2020-10-08
Payer: MEDICARE

## 2020-10-08 DIAGNOSIS — Z03.818 ENCOUNTER FOR OBSERVATION FOR SUSPECTED EXPOSURE TO OTHER BIOLOGICAL AGENTS RULED OUT: ICD-10-CM

## 2020-10-08 LAB — SARS-COV-2 RNA RESP QL NAA+PROBE: NOT DETECTED

## 2020-10-08 PROCEDURE — U0003 INFECTIOUS AGENT DETECTION BY NUCLEIC ACID (DNA OR RNA); SEVERE ACUTE RESPIRATORY SYNDROME CORONAVIRUS 2 (SARS-COV-2) (CORONAVIRUS DISEASE [COVID-19]), AMPLIFIED PROBE TECHNIQUE, MAKING USE OF HIGH THROUGHPUT TECHNOLOGIES AS DESCRIBED BY CMS-2020-01-R: HCPCS

## 2020-10-15 ENCOUNTER — LAB VISIT (OUTPATIENT)
Dept: LAB | Facility: OTHER | Age: 45
End: 2020-10-15
Payer: MEDICARE

## 2020-10-15 DIAGNOSIS — Z03.818 ENCOUNTER FOR OBSERVATION FOR SUSPECTED EXPOSURE TO OTHER BIOLOGICAL AGENTS RULED OUT: ICD-10-CM

## 2020-10-15 LAB — SARS-COV-2 RNA RESP QL NAA+PROBE: NOT DETECTED

## 2020-10-15 PROCEDURE — U0003 INFECTIOUS AGENT DETECTION BY NUCLEIC ACID (DNA OR RNA); SEVERE ACUTE RESPIRATORY SYNDROME CORONAVIRUS 2 (SARS-COV-2) (CORONAVIRUS DISEASE [COVID-19]), AMPLIFIED PROBE TECHNIQUE, MAKING USE OF HIGH THROUGHPUT TECHNOLOGIES AS DESCRIBED BY CMS-2020-01-R: HCPCS

## 2020-10-22 ENCOUNTER — LAB VISIT (OUTPATIENT)
Dept: LAB | Facility: OTHER | Age: 45
End: 2020-10-22
Attending: INTERNAL MEDICINE
Payer: MEDICARE

## 2020-10-22 DIAGNOSIS — Z03.818 ENCOUNTER FOR OBSERVATION FOR SUSPECTED EXPOSURE TO OTHER BIOLOGICAL AGENTS RULED OUT: ICD-10-CM

## 2020-10-22 LAB — SARS-COV-2 RNA RESP QL NAA+PROBE: NOT DETECTED

## 2020-10-22 PROCEDURE — U0003 INFECTIOUS AGENT DETECTION BY NUCLEIC ACID (DNA OR RNA); SEVERE ACUTE RESPIRATORY SYNDROME CORONAVIRUS 2 (SARS-COV-2) (CORONAVIRUS DISEASE [COVID-19]), AMPLIFIED PROBE TECHNIQUE, MAKING USE OF HIGH THROUGHPUT TECHNOLOGIES AS DESCRIBED BY CMS-2020-01-R: HCPCS

## 2020-10-29 ENCOUNTER — LAB VISIT (OUTPATIENT)
Dept: LAB | Facility: OTHER | Age: 45
End: 2020-10-29
Payer: MEDICARE

## 2020-10-29 DIAGNOSIS — Z03.818 ENCOUNTER FOR OBSERVATION FOR SUSPECTED EXPOSURE TO OTHER BIOLOGICAL AGENTS RULED OUT: ICD-10-CM

## 2020-10-29 LAB — SARS-COV-2 RNA RESP QL NAA+PROBE: NOT DETECTED

## 2020-10-29 PROCEDURE — U0003 INFECTIOUS AGENT DETECTION BY NUCLEIC ACID (DNA OR RNA); SEVERE ACUTE RESPIRATORY SYNDROME CORONAVIRUS 2 (SARS-COV-2) (CORONAVIRUS DISEASE [COVID-19]), AMPLIFIED PROBE TECHNIQUE, MAKING USE OF HIGH THROUGHPUT TECHNOLOGIES AS DESCRIBED BY CMS-2020-01-R: HCPCS

## 2020-11-05 ENCOUNTER — LAB VISIT (OUTPATIENT)
Dept: LAB | Facility: OTHER | Age: 45
End: 2020-11-05
Payer: MEDICARE

## 2020-11-05 DIAGNOSIS — Z03.818 ENCOUNTER FOR OBSERVATION FOR SUSPECTED EXPOSURE TO OTHER BIOLOGICAL AGENTS RULED OUT: ICD-10-CM

## 2020-11-05 PROCEDURE — U0003 INFECTIOUS AGENT DETECTION BY NUCLEIC ACID (DNA OR RNA); SEVERE ACUTE RESPIRATORY SYNDROME CORONAVIRUS 2 (SARS-COV-2) (CORONAVIRUS DISEASE [COVID-19]), AMPLIFIED PROBE TECHNIQUE, MAKING USE OF HIGH THROUGHPUT TECHNOLOGIES AS DESCRIBED BY CMS-2020-01-R: HCPCS

## 2020-11-06 LAB — SARS-COV-2 RNA RESP QL NAA+PROBE: NOT DETECTED

## 2020-11-12 ENCOUNTER — LAB VISIT (OUTPATIENT)
Dept: LAB | Facility: OTHER | Age: 45
End: 2020-11-12
Payer: MEDICARE

## 2020-11-12 DIAGNOSIS — Z03.818 ENCOUNTER FOR OBSERVATION FOR SUSPECTED EXPOSURE TO OTHER BIOLOGICAL AGENTS RULED OUT: ICD-10-CM

## 2020-11-12 LAB — SARS-COV-2 RNA RESP QL NAA+PROBE: NOT DETECTED

## 2020-11-12 PROCEDURE — U0003 INFECTIOUS AGENT DETECTION BY NUCLEIC ACID (DNA OR RNA); SEVERE ACUTE RESPIRATORY SYNDROME CORONAVIRUS 2 (SARS-COV-2) (CORONAVIRUS DISEASE [COVID-19]), AMPLIFIED PROBE TECHNIQUE, MAKING USE OF HIGH THROUGHPUT TECHNOLOGIES AS DESCRIBED BY CMS-2020-01-R: HCPCS

## 2020-11-25 ENCOUNTER — LAB VISIT (OUTPATIENT)
Dept: LAB | Facility: OTHER | Age: 45
End: 2020-11-25
Attending: INTERNAL MEDICINE
Payer: MEDICARE

## 2020-11-25 DIAGNOSIS — Z03.818 ENCOUNTER FOR OBSERVATION FOR SUSPECTED EXPOSURE TO OTHER BIOLOGICAL AGENTS RULED OUT: ICD-10-CM

## 2020-11-25 PROCEDURE — U0003 INFECTIOUS AGENT DETECTION BY NUCLEIC ACID (DNA OR RNA); SEVERE ACUTE RESPIRATORY SYNDROME CORONAVIRUS 2 (SARS-COV-2) (CORONAVIRUS DISEASE [COVID-19]), AMPLIFIED PROBE TECHNIQUE, MAKING USE OF HIGH THROUGHPUT TECHNOLOGIES AS DESCRIBED BY CMS-2020-01-R: HCPCS

## 2020-11-26 LAB — SARS-COV-2 RNA RESP QL NAA+PROBE: NOT DETECTED

## 2020-12-03 ENCOUNTER — LAB VISIT (OUTPATIENT)
Dept: LAB | Facility: OTHER | Age: 45
End: 2020-12-03
Payer: MEDICARE

## 2020-12-03 DIAGNOSIS — Z03.818 ENCOUNTER FOR OBSERVATION FOR SUSPECTED EXPOSURE TO OTHER BIOLOGICAL AGENTS RULED OUT: ICD-10-CM

## 2020-12-03 PROCEDURE — U0003 INFECTIOUS AGENT DETECTION BY NUCLEIC ACID (DNA OR RNA); SEVERE ACUTE RESPIRATORY SYNDROME CORONAVIRUS 2 (SARS-COV-2) (CORONAVIRUS DISEASE [COVID-19]), AMPLIFIED PROBE TECHNIQUE, MAKING USE OF HIGH THROUGHPUT TECHNOLOGIES AS DESCRIBED BY CMS-2020-01-R: HCPCS

## 2020-12-04 LAB — SARS-COV-2 RNA RESP QL NAA+PROBE: NOT DETECTED

## 2021-02-25 ENCOUNTER — LAB VISIT (OUTPATIENT)
Dept: LAB | Facility: OTHER | Age: 46
End: 2021-02-25
Payer: MEDICARE

## 2021-02-25 DIAGNOSIS — Z20.822 ENCOUNTER FOR LABORATORY TESTING FOR COVID-19 VIRUS: ICD-10-CM

## 2021-02-25 PROCEDURE — U0003 INFECTIOUS AGENT DETECTION BY NUCLEIC ACID (DNA OR RNA); SEVERE ACUTE RESPIRATORY SYNDROME CORONAVIRUS 2 (SARS-COV-2) (CORONAVIRUS DISEASE [COVID-19]), AMPLIFIED PROBE TECHNIQUE, MAKING USE OF HIGH THROUGHPUT TECHNOLOGIES AS DESCRIBED BY CMS-2020-01-R: HCPCS

## 2021-02-26 LAB — SARS-COV-2 RNA RESP QL NAA+PROBE: NOT DETECTED

## 2021-05-03 ENCOUNTER — HOSPITAL ENCOUNTER (EMERGENCY)
Facility: HOSPITAL | Age: 46
Discharge: HOME | End: 2021-05-03
Attending: EMERGENCY MEDICINE
Payer: MEDICARE

## 2021-05-03 VITALS
RESPIRATION RATE: 15 BRPM | HEIGHT: 72 IN | HEART RATE: 76 BPM | DIASTOLIC BLOOD PRESSURE: 77 MMHG | WEIGHT: 293 LBS | OXYGEN SATURATION: 100 % | BODY MASS INDEX: 39.68 KG/M2 | TEMPERATURE: 98 F | SYSTOLIC BLOOD PRESSURE: 165 MMHG

## 2021-05-03 DIAGNOSIS — D50.8 OTHER IRON DEFICIENCY ANEMIA: Primary | ICD-10-CM

## 2021-05-03 DIAGNOSIS — R42 DIZZINESS: ICD-10-CM

## 2021-05-03 DIAGNOSIS — R06.09 DYSPNEA ON EXERTION: ICD-10-CM

## 2021-05-03 LAB
ALBUMIN SERPL BCP-MCNC: 4.2 G/DL (ref 3.5–5.2)
ALP SERPL-CCNC: 96 U/L (ref 38–126)
ALT SERPL W/O P-5'-P-CCNC: 26 U/L (ref 10–44)
ANION GAP SERPL CALC-SCNC: 10 MMOL/L (ref 8–16)
AST SERPL-CCNC: 32 U/L (ref 15–46)
B-HCG UR QL: NEGATIVE
BACTERIA #/AREA URNS AUTO: ABNORMAL /HPF
BASOPHILS # BLD AUTO: 0.01 K/UL (ref 0–0.2)
BASOPHILS NFR BLD: 0.2 % (ref 0–1.9)
BILIRUB SERPL-MCNC: 0.3 MG/DL (ref 0.1–1)
BILIRUB UR QL STRIP: NEGATIVE
CALCIUM SERPL-MCNC: 9.1 MG/DL (ref 8.7–10.5)
CHLORIDE SERPL-SCNC: 106 MMOL/L (ref 95–110)
CLARITY UR REFRACT.AUTO: CLEAR
CO2 SERPL-SCNC: 25 MMOL/L (ref 23–29)
COLOR UR AUTO: YELLOW
CREAT SERPL-MCNC: 0.98 MG/DL (ref 0.5–1.4)
DIFFERENTIAL METHOD: ABNORMAL
EOSINOPHIL # BLD AUTO: 0.4 K/UL (ref 0–0.5)
EOSINOPHIL NFR BLD: 7.2 % (ref 0–8)
ERYTHROCYTE [DISTWIDTH] IN BLOOD BY AUTOMATED COUNT: 17.8 % (ref 11.5–14.5)
EST. GFR  (AFRICAN AMERICAN): >60 ML/MIN/1.73 M^2
EST. GFR  (NON AFRICAN AMERICAN): >60 ML/MIN/1.73 M^2
GLUCOSE SERPL-MCNC: 102 MG/DL (ref 70–110)
GLUCOSE UR QL STRIP: NEGATIVE
HCT VFR BLD AUTO: 29.6 % (ref 37–48.5)
HGB BLD-MCNC: 8.4 G/DL (ref 12–16)
HGB UR QL STRIP: NEGATIVE
HYALINE CASTS UR QL AUTO: 0 /LPF
IMM GRANULOCYTES # BLD AUTO: 0.01 K/UL (ref 0–0.04)
IMM GRANULOCYTES NFR BLD AUTO: 0.2 % (ref 0–0.5)
KETONES UR QL STRIP: NEGATIVE
LEUKOCYTE ESTERASE UR QL STRIP: NEGATIVE
LYMPHOCYTES # BLD AUTO: 1.8 K/UL (ref 1–4.8)
LYMPHOCYTES NFR BLD: 33.5 % (ref 18–48)
MCH RBC QN AUTO: 21.8 PG (ref 27–31)
MCHC RBC AUTO-ENTMCNC: 28.4 G/DL (ref 32–36)
MCV RBC AUTO: 77 FL (ref 82–98)
MICROSCOPIC COMMENT: ABNORMAL
MONOCYTES # BLD AUTO: 0.4 K/UL (ref 0.3–1)
MONOCYTES NFR BLD: 6.7 % (ref 4–15)
NEUTROPHILS # BLD AUTO: 2.8 K/UL (ref 1.8–7.7)
NEUTROPHILS NFR BLD: 52.2 % (ref 38–73)
NITRITE UR QL STRIP: NEGATIVE
NRBC BLD-RTO: 0 /100 WBC
NT-PROBNP SERPL-MCNC: 116 PG/ML (ref 5–450)
PH UR STRIP: 7 [PH] (ref 5–8)
PLATELET # BLD AUTO: 349 K/UL (ref 150–450)
PMV BLD AUTO: 9.9 FL (ref 9.2–12.9)
POTASSIUM SERPL-SCNC: 4 MMOL/L (ref 3.5–5.1)
PROT SERPL-MCNC: 8.4 G/DL (ref 6–8.4)
PROT UR QL STRIP: ABNORMAL
RBC # BLD AUTO: 3.85 M/UL (ref 4–5.4)
RBC #/AREA URNS AUTO: 1 /HPF (ref 0–4)
SODIUM SERPL-SCNC: 141 MMOL/L (ref 136–145)
SP GR UR STRIP: 1.01 (ref 1–1.03)
TROPONIN I SERPL-MCNC: <0.012 NG/ML (ref 0.01–0.03)
URN SPEC COLLECT METH UR: ABNORMAL
UROBILINOGEN UR STRIP-ACNC: NEGATIVE EU/DL
UUN UR-MCNC: 18 MG/DL (ref 7–17)
WBC # BLD AUTO: 5.38 K/UL (ref 3.9–12.7)
WBC #/AREA URNS AUTO: 2 /HPF (ref 0–5)

## 2021-05-03 PROCEDURE — 81000 URINALYSIS NONAUTO W/SCOPE: CPT | Mod: ER | Performed by: PHYSICIAN ASSISTANT

## 2021-05-03 PROCEDURE — 83880 ASSAY OF NATRIURETIC PEPTIDE: CPT | Mod: ER | Performed by: PHYSICIAN ASSISTANT

## 2021-05-03 PROCEDURE — 85025 COMPLETE CBC W/AUTO DIFF WBC: CPT | Mod: ER | Performed by: PHYSICIAN ASSISTANT

## 2021-05-03 PROCEDURE — 93010 ELECTROCARDIOGRAM REPORT: CPT | Mod: ,,, | Performed by: INTERNAL MEDICINE

## 2021-05-03 PROCEDURE — 93010 EKG 12-LEAD: ICD-10-PCS | Mod: ,,, | Performed by: INTERNAL MEDICINE

## 2021-05-03 PROCEDURE — 99285 EMERGENCY DEPT VISIT HI MDM: CPT | Mod: 25,ER

## 2021-05-03 PROCEDURE — 80053 COMPREHEN METABOLIC PANEL: CPT | Mod: ER | Performed by: PHYSICIAN ASSISTANT

## 2021-05-03 PROCEDURE — 81025 URINE PREGNANCY TEST: CPT | Mod: ER | Performed by: PHYSICIAN ASSISTANT

## 2021-05-03 PROCEDURE — 93005 ELECTROCARDIOGRAM TRACING: CPT | Mod: ER

## 2021-05-03 PROCEDURE — 84484 ASSAY OF TROPONIN QUANT: CPT | Mod: ER | Performed by: PHYSICIAN ASSISTANT

## 2021-05-03 RX ORDER — FERROUS SULFATE 325(65) MG
325 TABLET ORAL DAILY
Qty: 30 TABLET | Refills: 0 | Status: SHIPPED | OUTPATIENT
Start: 2021-05-03 | End: 2022-04-25

## 2021-05-03 RX ORDER — BISACODYL 5 MG
5 TABLET, DELAYED RELEASE (ENTERIC COATED) ORAL DAILY PRN
Qty: 14 TABLET | Refills: 0 | Status: SHIPPED | OUTPATIENT
Start: 2021-05-03 | End: 2022-04-25

## 2021-05-13 ENCOUNTER — TELEPHONE (OUTPATIENT)
Dept: OBSTETRICS AND GYNECOLOGY | Facility: CLINIC | Age: 46
End: 2021-05-13

## 2021-05-27 ENCOUNTER — HOSPITAL ENCOUNTER (EMERGENCY)
Facility: HOSPITAL | Age: 46
Discharge: HOME | End: 2021-05-27
Attending: FAMILY MEDICINE
Payer: MEDICARE

## 2021-05-27 VITALS
WEIGHT: 293 LBS | TEMPERATURE: 98 F | RESPIRATION RATE: 18 BRPM | BODY MASS INDEX: 39.68 KG/M2 | SYSTOLIC BLOOD PRESSURE: 132 MMHG | HEIGHT: 72 IN | DIASTOLIC BLOOD PRESSURE: 77 MMHG | HEART RATE: 72 BPM | OXYGEN SATURATION: 98 %

## 2021-05-27 DIAGNOSIS — R10.9 ACUTE RIGHT FLANK PAIN: Primary | ICD-10-CM

## 2021-05-27 DIAGNOSIS — R07.81 RIB PAIN ON LEFT SIDE: ICD-10-CM

## 2021-05-27 LAB
B-HCG UR QL: NEGATIVE
BACTERIA #/AREA URNS AUTO: ABNORMAL /HPF
BILIRUB UR QL STRIP: NEGATIVE
CLARITY UR REFRACT.AUTO: CLEAR
COLOR UR AUTO: YELLOW
GLUCOSE UR QL STRIP: NEGATIVE
HGB UR QL STRIP: NEGATIVE
HYALINE CASTS UR QL AUTO: 0 /LPF
KETONES UR QL STRIP: NEGATIVE
LEUKOCYTE ESTERASE UR QL STRIP: NEGATIVE
MICROSCOPIC COMMENT: ABNORMAL
NITRITE UR QL STRIP: NEGATIVE
PH UR STRIP: 6 [PH] (ref 5–8)
PROT UR QL STRIP: ABNORMAL
RBC #/AREA URNS AUTO: 1 /HPF (ref 0–4)
SP GR UR STRIP: 1.02 (ref 1–1.03)
URN SPEC COLLECT METH UR: ABNORMAL
UROBILINOGEN UR STRIP-ACNC: ABNORMAL EU/DL
WBC #/AREA URNS AUTO: 1 /HPF (ref 0–5)

## 2021-05-27 PROCEDURE — 25000003 PHARM REV CODE 250: Mod: ER | Performed by: PHYSICIAN ASSISTANT

## 2021-05-27 PROCEDURE — 81025 URINE PREGNANCY TEST: CPT | Mod: ER | Performed by: PHYSICIAN ASSISTANT

## 2021-05-27 PROCEDURE — 81000 URINALYSIS NONAUTO W/SCOPE: CPT | Mod: ER | Performed by: PHYSICIAN ASSISTANT

## 2021-05-27 PROCEDURE — 99284 EMERGENCY DEPT VISIT MOD MDM: CPT | Mod: 25,ER

## 2021-05-27 RX ORDER — KETOROLAC TROMETHAMINE 10 MG/1
10 TABLET, FILM COATED ORAL
Status: COMPLETED | OUTPATIENT
Start: 2021-05-27 | End: 2021-05-27

## 2021-05-27 RX ORDER — MELOXICAM 15 MG/1
15 TABLET ORAL DAILY PRN
Qty: 7 TABLET | Refills: 0 | Status: SHIPPED | OUTPATIENT
Start: 2021-05-28

## 2021-05-27 RX ADMIN — KETOROLAC TROMETHAMINE 10 MG: 10 TABLET, FILM COATED ORAL at 05:05

## 2021-07-13 ENCOUNTER — HOSPITAL ENCOUNTER (OUTPATIENT)
Dept: RADIOLOGY | Facility: HOSPITAL | Age: 46
Discharge: HOME | End: 2021-07-13
Attending: INTERNAL MEDICINE
Payer: MEDICARE

## 2021-07-13 DIAGNOSIS — Z12.31 ENCOUNTER FOR SCREENING MAMMOGRAM FOR MALIGNANT NEOPLASM OF BREAST: ICD-10-CM

## 2021-07-13 PROCEDURE — 77063 BREAST TOMOSYNTHESIS BI: CPT | Mod: 26,,, | Performed by: RADIOLOGY

## 2021-07-13 PROCEDURE — 77063 MAMMO DIGITAL SCREENING BILAT WITH TOMO: ICD-10-PCS | Mod: 26,,, | Performed by: RADIOLOGY

## 2021-07-13 PROCEDURE — 77067 MAMMO DIGITAL SCREENING BILAT WITH TOMO: ICD-10-PCS | Mod: 26,,, | Performed by: RADIOLOGY

## 2021-07-13 PROCEDURE — 77067 SCR MAMMO BI INCL CAD: CPT | Mod: TC

## 2021-07-13 PROCEDURE — 77067 SCR MAMMO BI INCL CAD: CPT | Mod: 26,,, | Performed by: RADIOLOGY

## 2022-01-18 ENCOUNTER — LAB VISIT (OUTPATIENT)
Dept: LAB | Facility: OTHER | Age: 47
End: 2022-01-18
Payer: MEDICARE

## 2022-01-18 DIAGNOSIS — Z20.822 ENCOUNTER FOR LABORATORY TESTING FOR COVID-19 VIRUS: ICD-10-CM

## 2022-01-18 PROCEDURE — U0003 INFECTIOUS AGENT DETECTION BY NUCLEIC ACID (DNA OR RNA); SEVERE ACUTE RESPIRATORY SYNDROME CORONAVIRUS 2 (SARS-COV-2) (CORONAVIRUS DISEASE [COVID-19]), AMPLIFIED PROBE TECHNIQUE, MAKING USE OF HIGH THROUGHPUT TECHNOLOGIES AS DESCRIBED BY CMS-2020-01-R: HCPCS | Performed by: NURSE PRACTITIONER

## 2022-01-19 LAB
SARS-COV-2 RNA RESP QL NAA+PROBE: NOT DETECTED
SARS-COV-2- CYCLE NUMBER: NORMAL

## 2022-04-25 ENCOUNTER — OFFICE VISIT (OUTPATIENT)
Dept: OBSTETRICS AND GYNECOLOGY | Facility: CLINIC | Age: 47
End: 2022-04-25
Payer: MEDICARE

## 2022-04-25 ENCOUNTER — LAB VISIT (OUTPATIENT)
Dept: LAB | Facility: HOSPITAL | Age: 47
End: 2022-04-25
Attending: OBSTETRICS & GYNECOLOGY
Payer: MEDICARE

## 2022-04-25 VITALS
BODY MASS INDEX: 39.68 KG/M2 | SYSTOLIC BLOOD PRESSURE: 137 MMHG | WEIGHT: 293 LBS | DIASTOLIC BLOOD PRESSURE: 86 MMHG | HEIGHT: 72 IN

## 2022-04-25 DIAGNOSIS — Z11.4 ENCOUNTER FOR SCREENING FOR HIV: ICD-10-CM

## 2022-04-25 DIAGNOSIS — Z01.419 ROUTINE GYNECOLOGICAL EXAMINATION: ICD-10-CM

## 2022-04-25 DIAGNOSIS — Z01.419 ROUTINE GYNECOLOGICAL EXAMINATION: Primary | ICD-10-CM

## 2022-04-25 DIAGNOSIS — Z12.31 VISIT FOR SCREENING MAMMOGRAM: ICD-10-CM

## 2022-04-25 DIAGNOSIS — Z12.4 CERVICAL CANCER SCREENING: ICD-10-CM

## 2022-04-25 DIAGNOSIS — N76.0 ACUTE VAGINITIS: ICD-10-CM

## 2022-04-25 DIAGNOSIS — R10.2 PELVIC PAIN IN FEMALE: ICD-10-CM

## 2022-04-25 PROCEDURE — 3079F DIAST BP 80-89 MM HG: CPT | Mod: CPTII,S$GLB,, | Performed by: OBSTETRICS & GYNECOLOGY

## 2022-04-25 PROCEDURE — 87389 HIV-1 AG W/HIV-1&-2 AB AG IA: CPT | Performed by: OBSTETRICS & GYNECOLOGY

## 2022-04-25 PROCEDURE — 3075F PR MOST RECENT SYSTOLIC BLOOD PRESS GE 130-139MM HG: ICD-10-PCS | Mod: CPTII,S$GLB,, | Performed by: OBSTETRICS & GYNECOLOGY

## 2022-04-25 PROCEDURE — 87491 CHLMYD TRACH DNA AMP PROBE: CPT | Performed by: OBSTETRICS & GYNECOLOGY

## 2022-04-25 PROCEDURE — 1159F MED LIST DOCD IN RCRD: CPT | Mod: CPTII,S$GLB,, | Performed by: OBSTETRICS & GYNECOLOGY

## 2022-04-25 PROCEDURE — 88175 CYTOPATH C/V AUTO FLUID REDO: CPT | Performed by: OBSTETRICS & GYNECOLOGY

## 2022-04-25 PROCEDURE — 87624 HPV HI-RISK TYP POOLED RSLT: CPT | Performed by: OBSTETRICS & GYNECOLOGY

## 2022-04-25 PROCEDURE — 3008F BODY MASS INDEX DOCD: CPT | Mod: CPTII,S$GLB,, | Performed by: OBSTETRICS & GYNECOLOGY

## 2022-04-25 PROCEDURE — 1159F PR MEDICATION LIST DOCUMENTED IN MEDICAL RECORD: ICD-10-PCS | Mod: CPTII,S$GLB,, | Performed by: OBSTETRICS & GYNECOLOGY

## 2022-04-25 PROCEDURE — G0101 PR CA SCREEN;PELVIC/BREAST EXAM: ICD-10-PCS | Mod: GZ,S$GLB,, | Performed by: OBSTETRICS & GYNECOLOGY

## 2022-04-25 PROCEDURE — 99999 PR PBB SHADOW E&M-EST. PATIENT-LVL III: ICD-10-PCS | Mod: PBBFAC,,, | Performed by: OBSTETRICS & GYNECOLOGY

## 2022-04-25 PROCEDURE — 99999 PR PBB SHADOW E&M-EST. PATIENT-LVL III: CPT | Mod: PBBFAC,,, | Performed by: OBSTETRICS & GYNECOLOGY

## 2022-04-25 PROCEDURE — 3079F PR MOST RECENT DIASTOLIC BLOOD PRESSURE 80-89 MM HG: ICD-10-PCS | Mod: CPTII,S$GLB,, | Performed by: OBSTETRICS & GYNECOLOGY

## 2022-04-25 PROCEDURE — 87591 N.GONORRHOEAE DNA AMP PROB: CPT | Performed by: OBSTETRICS & GYNECOLOGY

## 2022-04-25 PROCEDURE — 3008F PR BODY MASS INDEX (BMI) DOCUMENTED: ICD-10-PCS | Mod: CPTII,S$GLB,, | Performed by: OBSTETRICS & GYNECOLOGY

## 2022-04-25 PROCEDURE — 3075F SYST BP GE 130 - 139MM HG: CPT | Mod: CPTII,S$GLB,, | Performed by: OBSTETRICS & GYNECOLOGY

## 2022-04-25 PROCEDURE — 36415 COLL VENOUS BLD VENIPUNCTURE: CPT | Performed by: OBSTETRICS & GYNECOLOGY

## 2022-04-25 PROCEDURE — G0101 CA SCREEN;PELVIC/BREAST EXAM: HCPCS | Mod: GZ,S$GLB,, | Performed by: OBSTETRICS & GYNECOLOGY

## 2022-04-25 NOTE — PROGRESS NOTES
46 yo  female presenting for routine gyn visit.  Patient has no gyn complaints at this time. Cycles come q month and last about 3-4 days.  Patient is s/p BTL.  Has boyfriend who is in detention.  Did have sex with another partner, however.  Wants STD testing.  Patient desires Pap smear today.  Patient wants mammogram.  Concerned about abdominal pain and ovarian cyst.    ROS:  GENERAL: Denies weight gain or weight loss. Feeling well overall.   SKIN: Denies rash or lesions.   CHEST: Denies chest pain or shortness of breath.   CARDIOVASCULAR: Denies palpitations or left sided chest pain.   ABDOMEN: No abdominal pain, constipation, diarrhea, nausea, vomiting or rectal bleeding.   URINARY: No frequency, dysuria, hematuria, or burning on urination.  REPRODUCTIVE: no complaints  BREASTS: denies pain, lumps, or nipple discharge.   HEMATOLOGIC: No easy bruisability or excessive bleeding.   MUSCULOSKELETAL: Denies joint pain or swelling.   NEUROLOGIC: Denies syncope or weakness.   PSYCHIATRIC: Denies depression, anxiety or mood swings.         PE:   Vitals: /86   Ht 6' (1.829 m)   Wt (!) 183.7 kg (404 lb 15.8 oz)   LMP 2022   BMI 54.93 kg/m²   APPEARANCE: Well nourished, well developed, in no acute distress.  SKIN: Normal skin turgor, no lesions.  ABDOMEN: Soft. No tenderness or masses. No hepatosplenomegaly. No hernias. Obese.  BREASTS: Symmetrical, no skin changes or visible lesions. No palpable masses, nipple discharge or adenopathy bilaterally.  PELVIC: Normal external female genitalia without lesions. Normal hair distribution. Adequate perineal body, normal urethral meatus. Vagina moist and well rugated without lesions or discharge. Cervix pink and without lesions. No significant cystocele or rectocele. Bimanual exam showed uterus normal size, shape, position, mobile and nontender. Adnexa without masses or tenderness. Urethra and bladder normal.  EXTREMITIES: No clubbing cyanosis or  edema.    AP  Routine gyn  -s/p normal breast exam: mammogram ordered  -s/p normal pelvic exam:   -Pap and HPV: collected  -STD testing: gc/chl and HIV ordered  -contraception: s/p BTL  -TVUS to check for ovarian cyst    F/u in 1 yr    DAVE Kwon MD

## 2022-04-28 LAB
HPV HR 12 DNA SPEC QL NAA+PROBE: NEGATIVE
HPV16 AG SPEC QL: NEGATIVE
HPV18 DNA SPEC QL NAA+PROBE: NEGATIVE

## 2022-05-04 LAB
FINAL PATHOLOGIC DIAGNOSIS: NORMAL
Lab: NORMAL

## 2022-05-04 NOTE — PROGRESS NOTES
pap  and hpv are normal    Your pelvic exam will still need to occur once a year!    See you then!    Dr tellez

## 2022-05-27 ENCOUNTER — LAB VISIT (OUTPATIENT)
Dept: LAB | Facility: OTHER | Age: 47
End: 2022-05-27
Payer: MEDICARE

## 2022-05-27 DIAGNOSIS — Z20.822 ENCOUNTER FOR LABORATORY TESTING FOR COVID-19 VIRUS: ICD-10-CM

## 2022-05-27 PROCEDURE — U0003 INFECTIOUS AGENT DETECTION BY NUCLEIC ACID (DNA OR RNA); SEVERE ACUTE RESPIRATORY SYNDROME CORONAVIRUS 2 (SARS-COV-2) (CORONAVIRUS DISEASE [COVID-19]), AMPLIFIED PROBE TECHNIQUE, MAKING USE OF HIGH THROUGHPUT TECHNOLOGIES AS DESCRIBED BY CMS-2020-01-R: HCPCS | Performed by: EMERGENCY MEDICINE

## 2022-05-28 LAB — SARS-COV-2 RNA RESP QL NAA+PROBE: NOT DETECTED

## 2022-06-09 ENCOUNTER — TELEPHONE (OUTPATIENT)
Dept: BARIATRICS | Facility: CLINIC | Age: 47
End: 2022-06-09
Payer: MEDICARE

## 2022-06-10 ENCOUNTER — TELEPHONE (OUTPATIENT)
Dept: BARIATRICS | Facility: CLINIC | Age: 47
End: 2022-06-10
Payer: MEDICARE

## 2022-07-25 ENCOUNTER — HOSPITAL ENCOUNTER (OUTPATIENT)
Dept: RADIOLOGY | Facility: HOSPITAL | Age: 47
Discharge: HOME | End: 2022-07-25
Attending: OBSTETRICS & GYNECOLOGY
Payer: MEDICARE

## 2022-07-25 DIAGNOSIS — Z12.31 VISIT FOR SCREENING MAMMOGRAM: ICD-10-CM

## 2022-07-25 PROCEDURE — 77063 BREAST TOMOSYNTHESIS BI: CPT | Mod: 26,,, | Performed by: RADIOLOGY

## 2022-07-25 PROCEDURE — 77067 SCR MAMMO BI INCL CAD: CPT | Mod: 26,,, | Performed by: RADIOLOGY

## 2022-07-25 PROCEDURE — 77067 SCR MAMMO BI INCL CAD: CPT | Mod: TC

## 2022-07-25 PROCEDURE — 77063 MAMMO DIGITAL SCREENING BILAT WITH TOMO: ICD-10-PCS | Mod: 26,,, | Performed by: RADIOLOGY

## 2022-07-25 PROCEDURE — 77067 MAMMO DIGITAL SCREENING BILAT WITH TOMO: ICD-10-PCS | Mod: 26,,, | Performed by: RADIOLOGY

## 2022-10-06 ENCOUNTER — PATIENT MESSAGE (OUTPATIENT)
Dept: BARIATRICS | Facility: CLINIC | Age: 47
End: 2022-10-06
Payer: MEDICARE

## 2023-01-30 ENCOUNTER — HOSPITAL ENCOUNTER (EMERGENCY)
Facility: HOSPITAL | Age: 48
Discharge: HOME | End: 2023-01-30
Attending: EMERGENCY MEDICINE
Payer: MEDICARE

## 2023-01-30 VITALS
OXYGEN SATURATION: 100 % | DIASTOLIC BLOOD PRESSURE: 72 MMHG | SYSTOLIC BLOOD PRESSURE: 133 MMHG | HEART RATE: 79 BPM | TEMPERATURE: 98 F | HEIGHT: 71 IN | RESPIRATION RATE: 18 BRPM | BODY MASS INDEX: 55.15 KG/M2

## 2023-01-30 DIAGNOSIS — B34.9 ACUTE VIRAL SYNDROME: Primary | ICD-10-CM

## 2023-01-30 LAB
INFLUENZA A, MOLECULAR: NEGATIVE
INFLUENZA B, MOLECULAR: NEGATIVE
SARS-COV-2 RDRP RESP QL NAA+PROBE: NEGATIVE
SPECIMEN SOURCE: NORMAL

## 2023-01-30 PROCEDURE — 99284 EMERGENCY DEPT VISIT MOD MDM: CPT | Mod: 25,ER

## 2023-01-30 PROCEDURE — 87502 INFLUENZA DNA AMP PROBE: CPT | Mod: ER | Performed by: EMERGENCY MEDICINE

## 2023-01-30 PROCEDURE — U0002 COVID-19 LAB TEST NON-CDC: HCPCS | Mod: ER | Performed by: EMERGENCY MEDICINE

## 2023-01-30 RX ORDER — BENZONATATE 200 MG/1
200 CAPSULE ORAL 3 TIMES DAILY PRN
Qty: 20 CAPSULE | Refills: 0 | Status: SHIPPED | OUTPATIENT
Start: 2023-01-30 | End: 2023-02-09

## 2023-01-30 RX ORDER — FLUTICASONE PROPIONATE 50 MCG
1 SPRAY, SUSPENSION (ML) NASAL 2 TIMES DAILY PRN
Qty: 15 G | Refills: 0 | Status: SHIPPED | OUTPATIENT
Start: 2023-01-30

## 2023-01-30 NOTE — Clinical Note
"Leticia OLIVA"Armen Law was seen and treated in our emergency department on 1/30/2023.  She may return to work on 02/02/2023.       If you have any questions or concerns, please don't hesitate to call.      LINSEY Quick RN    "

## 2023-01-31 NOTE — ED PROVIDER NOTES
Encounter Date: 2023       History     Chief Complaint   Patient presents with    Flu-like Symptoms     Patent reports generalized bodyaches, chills, and fever blister.      HPI  47 y.o.   1 day of myalgia, chills malaise    Review of patient's allergies indicates:  No Known Allergies  Past Medical History:   Diagnosis Date    Depression     Diabetes mellitus     Hyperlipidemia     Hypertension     Thyroid disease     possible hypthyroid disease     Past Surgical History:   Procedure Laterality Date     SECTION      TUBAL LIGATION       Family History   Problem Relation Age of Onset    Breast cancer Maternal Cousin      Social History     Tobacco Use    Smoking status: Never    Smokeless tobacco: Never   Substance Use Topics    Alcohol use: Yes     Comment: OCC    Drug use: No     Review of Systems  All systems were reviewed/examined and were negative except as noted in the HPI.    Physical Exam     Initial Vitals [23 2233]   BP Pulse Resp Temp SpO2   133/72 79 18 97.9 °F (36.6 °C) 100 %      MAP       --         Physical Exam    General: the patient is awake, alert, and in no apparent distress.  Head: normocephalic and atraumatic, sclera are clear  Neck: supple without meningismus  Chest:  no respiratory distress  Heart: regular rate and rhythm  Extremities: warm and well perfused  Skin: warm and dry  Psych conversant  Neuro: awake, alert, moving all extremities    ED Course   Procedures  Labs Reviewed   INFLUENZA A & B BY MOLECULAR   SARS-COV-2 RNA AMPLIFICATION, QUAL    Narrative:     Is the patient symptomatic?->Yes          Imaging Results    None          Medications - No data to display      Medical Decision Making:    This is an emergent evaluation of a patient presenting to the ED.  Nursing notes were reviewed.  Swabs neg  I personally reviewed and interpreted the laboratory results.    I decided to obtain and review old medical records, which showed: well care    Evaluation for Emergency  Medical Condition  The patient received a medical screening exam and within a reasonable degree of clinical confidence an emergency medical condition has not been identified.  The patient is instructed on proper follow up and return precautions to the ED.    The patient was encouraged strongly to get the COVID-19 vaccine either after asymptomatic (if COVID positive) or offered it here in the ED is COVID negative.  The patient was also encouraged to obtain an influenza vaccination if available once asymptomatic (if positive) or if testing negative in the ED.      Alejandro Villanueva MD, ROSANA                       Clinical Impression:   Final diagnoses:  [B34.9] Acute viral syndrome (Primary)        ED Disposition Condition    Discharge Stable          ED Prescriptions       Medication Sig Dispense Start Date End Date Auth. Provider    fluticasone propionate (FLONASE) 50 mcg/actuation nasal spray 1 spray (50 mcg total) by Each Nostril route 2 (two) times daily as needed for Rhinitis. 15 g 1/30/2023 -- Milan Villanueva MD    benzonatate (TESSALON) 200 MG capsule Take 1 capsule (200 mg total) by mouth 3 (three) times daily as needed for Cough. 20 capsule 1/30/2023 2/9/2023 Milan Villanueva MD          Follow-up Information       Follow up With Specialties Details Why Contact Info    Calli Sparks MD Cardiology Schedule an appointment as soon as possible for a visit   [address] [facility] [address]  [phone]            Discharged to home in stable condition, return to ED warnings given, follow up and patient care instructions given.      Alejandro Villanueva MD, ROSANA, [facility]  Department of Emergency Medicine       Milan Villanueva MD  02/01/23 1121

## 2023-01-31 NOTE — ED TRIAGE NOTES
Reports to ED c c/o generalized body aches. +Chills. Also reports fever blister to upper lip. +Nonproductive cough.

## 2023-04-26 ENCOUNTER — LAB VISIT (OUTPATIENT)
Dept: LAB | Facility: HOSPITAL | Age: 48
End: 2023-04-26
Attending: OBSTETRICS & GYNECOLOGY
Payer: MEDICARE

## 2023-04-26 ENCOUNTER — OFFICE VISIT (OUTPATIENT)
Dept: OBSTETRICS AND GYNECOLOGY | Facility: CLINIC | Age: 48
End: 2023-04-26
Payer: MEDICARE

## 2023-04-26 VITALS
BODY MASS INDEX: 50.02 KG/M2 | WEIGHT: 293 LBS | SYSTOLIC BLOOD PRESSURE: 124 MMHG | HEIGHT: 64 IN | DIASTOLIC BLOOD PRESSURE: 82 MMHG

## 2023-04-26 DIAGNOSIS — Z12.31 VISIT FOR SCREENING MAMMOGRAM: ICD-10-CM

## 2023-04-26 DIAGNOSIS — Z11.3 SCREENING EXAMINATION FOR STD (SEXUALLY TRANSMITTED DISEASE): ICD-10-CM

## 2023-04-26 DIAGNOSIS — Z12.4 CERVICAL CANCER SCREENING: ICD-10-CM

## 2023-04-26 DIAGNOSIS — Z01.419 ROUTINE GYNECOLOGICAL EXAMINATION: ICD-10-CM

## 2023-04-26 DIAGNOSIS — Z01.419 ROUTINE GYNECOLOGICAL EXAMINATION: Primary | ICD-10-CM

## 2023-04-26 PROCEDURE — 3074F SYST BP LT 130 MM HG: CPT | Mod: CPTII,S$GLB,, | Performed by: OBSTETRICS & GYNECOLOGY

## 2023-04-26 PROCEDURE — G0101 PR CA SCREEN;PELVIC/BREAST EXAM: ICD-10-PCS | Mod: GZ,S$GLB,, | Performed by: OBSTETRICS & GYNECOLOGY

## 2023-04-26 PROCEDURE — 87340 HEPATITIS B SURFACE AG IA: CPT | Performed by: OBSTETRICS & GYNECOLOGY

## 2023-04-26 PROCEDURE — 36415 COLL VENOUS BLD VENIPUNCTURE: CPT | Performed by: OBSTETRICS & GYNECOLOGY

## 2023-04-26 PROCEDURE — 3008F PR BODY MASS INDEX (BMI) DOCUMENTED: ICD-10-PCS | Mod: CPTII,S$GLB,, | Performed by: OBSTETRICS & GYNECOLOGY

## 2023-04-26 PROCEDURE — 86706 HEP B SURFACE ANTIBODY: CPT | Mod: 91 | Performed by: OBSTETRICS & GYNECOLOGY

## 2023-04-26 PROCEDURE — 3079F PR MOST RECENT DIASTOLIC BLOOD PRESSURE 80-89 MM HG: ICD-10-PCS | Mod: CPTII,S$GLB,, | Performed by: OBSTETRICS & GYNECOLOGY

## 2023-04-26 PROCEDURE — 3079F DIAST BP 80-89 MM HG: CPT | Mod: CPTII,S$GLB,, | Performed by: OBSTETRICS & GYNECOLOGY

## 2023-04-26 PROCEDURE — 99999 PR PBB SHADOW E&M-EST. PATIENT-LVL III: ICD-10-PCS | Mod: PBBFAC,,, | Performed by: OBSTETRICS & GYNECOLOGY

## 2023-04-26 PROCEDURE — 86592 SYPHILIS TEST NON-TREP QUAL: CPT | Performed by: OBSTETRICS & GYNECOLOGY

## 2023-04-26 PROCEDURE — 87389 HIV-1 AG W/HIV-1&-2 AB AG IA: CPT | Performed by: OBSTETRICS & GYNECOLOGY

## 2023-04-26 PROCEDURE — 1159F PR MEDICATION LIST DOCUMENTED IN MEDICAL RECORD: ICD-10-PCS | Mod: CPTII,S$GLB,, | Performed by: OBSTETRICS & GYNECOLOGY

## 2023-04-26 PROCEDURE — 1159F MED LIST DOCD IN RCRD: CPT | Mod: CPTII,S$GLB,, | Performed by: OBSTETRICS & GYNECOLOGY

## 2023-04-26 PROCEDURE — 3074F PR MOST RECENT SYSTOLIC BLOOD PRESSURE < 130 MM HG: ICD-10-PCS | Mod: CPTII,S$GLB,, | Performed by: OBSTETRICS & GYNECOLOGY

## 2023-04-26 PROCEDURE — 4010F PR ACE/ARB THEARPY RXD/TAKEN: ICD-10-PCS | Mod: CPTII,S$GLB,, | Performed by: OBSTETRICS & GYNECOLOGY

## 2023-04-26 PROCEDURE — 87624 HPV HI-RISK TYP POOLED RSLT: CPT | Performed by: OBSTETRICS & GYNECOLOGY

## 2023-04-26 PROCEDURE — 86803 HEPATITIS C AB TEST: CPT | Performed by: OBSTETRICS & GYNECOLOGY

## 2023-04-26 PROCEDURE — 4010F ACE/ARB THERAPY RXD/TAKEN: CPT | Mod: CPTII,S$GLB,, | Performed by: OBSTETRICS & GYNECOLOGY

## 2023-04-26 PROCEDURE — G0101 CA SCREEN;PELVIC/BREAST EXAM: HCPCS | Mod: GZ,S$GLB,, | Performed by: OBSTETRICS & GYNECOLOGY

## 2023-04-26 PROCEDURE — 99999 PR PBB SHADOW E&M-EST. PATIENT-LVL III: CPT | Mod: PBBFAC,,, | Performed by: OBSTETRICS & GYNECOLOGY

## 2023-04-26 PROCEDURE — 87591 N.GONORRHOEAE DNA AMP PROB: CPT | Performed by: OBSTETRICS & GYNECOLOGY

## 2023-04-26 PROCEDURE — 88175 CYTOPATH C/V AUTO FLUID REDO: CPT | Performed by: OBSTETRICS & GYNECOLOGY

## 2023-04-26 PROCEDURE — 3008F BODY MASS INDEX DOCD: CPT | Mod: CPTII,S$GLB,, | Performed by: OBSTETRICS & GYNECOLOGY

## 2023-04-26 NOTE — PROGRESS NOTES
"47 yo  perimenopausal female presenting for routine gyn visit.  Patient's last menstrual period was 2023.  Reports no cycle in dec 2022, 2023, and 2023.  Cycles have since returned.  Patient has no gyn complaints at this time. Cycles come q month and last about 3-4 days.  Patient is s/p BTL.  Ok with std testign today.  Patient desires Pap smear today.  Patient wants mammogram.      ROS:  GENERAL: Denies weight gain or weight loss. Feeling well overall.   SKIN: Denies rash or lesions.   CHEST: Denies chest pain or shortness of breath.   CARDIOVASCULAR: Denies palpitations or left sided chest pain.   ABDOMEN: No abdominal pain, constipation, diarrhea, nausea, vomiting or rectal bleeding.   URINARY: No frequency, dysuria, hematuria, or burning on urination.  REPRODUCTIVE: no complaints  BREASTS: denies pain, lumps, or nipple discharge.   HEMATOLOGIC: No easy bruisability or excessive bleeding.   MUSCULOSKELETAL: Denies joint pain or swelling.   NEUROLOGIC: Denies syncope or weakness.   PSYCHIATRIC: Denies depression, anxiety or mood swings.         PE:   Vitals: /82   Ht 5' 4" (1.626 m)   Wt (!) 182 kg (401 lb 3.8 oz)   LMP 2023   BMI 68.87 kg/m²   APPEARANCE: Well nourished, well developed, in no acute distress.  SKIN: Normal skin turgor, no lesions.  ABDOMEN: Soft. No tenderness or masses. No hepatosplenomegaly. No hernias. Obese.  BREASTS: Symmetrical, no skin changes or visible lesions. No palpable masses, nipple discharge or adenopathy bilaterally.  PELVIC: Normal external female genitalia without lesions. Normal hair distribution. Adequate perineal body, normal urethral meatus. Vagina moist and well rugated without lesions or discharge. Cervix pink and without lesions. No significant cystocele or rectocele. Bimanual exam showed uterus normal size, shape, position, mobile and nontender. Adnexa without masses or tenderness. Urethra and bladder normal.  EXTREMITIES: No clubbing " cyanosis or edema.    AP  Routine gyn  -s/p normal breast exam: mammogram ordered  -s/p normal pelvic exam:   -Pap and HPV: collected  -STD testing: everything ordered  -contraception: s/p BTL  F/u in 1 yr    DAVE Kwon MD

## 2023-04-27 LAB
HBV SURFACE AB SER-ACNC: >1000 MIU/ML
HBV SURFACE AB SER-ACNC: REACTIVE M[IU]/ML
HBV SURFACE AG SERPL QL IA: NORMAL
HCV AB SERPL QL IA: NORMAL
HIV 1+2 AB+HIV1 P24 AG SERPL QL IA: NORMAL
RPR SER QL: NORMAL

## 2023-04-29 LAB
C TRACH DNA SPEC QL NAA+PROBE: NOT DETECTED
N GONORRHOEA DNA SPEC QL NAA+PROBE: NOT DETECTED

## 2023-05-03 LAB
FINAL PATHOLOGIC DIAGNOSIS: NORMAL
HPV HR 12 DNA SPEC QL NAA+PROBE: NEGATIVE
HPV16 AG SPEC QL: NEGATIVE
HPV18 DNA SPEC QL NAA+PROBE: NEGATIVE
Lab: NORMAL

## 2023-12-17 ENCOUNTER — HOSPITAL ENCOUNTER (EMERGENCY)
Facility: HOSPITAL | Age: 48
Discharge: HOME | End: 2023-12-17
Attending: EMERGENCY MEDICINE
Payer: MEDICARE

## 2023-12-17 VITALS
HEART RATE: 91 BPM | RESPIRATION RATE: 20 BRPM | SYSTOLIC BLOOD PRESSURE: 146 MMHG | OXYGEN SATURATION: 100 % | TEMPERATURE: 98 F | DIASTOLIC BLOOD PRESSURE: 66 MMHG

## 2023-12-17 DIAGNOSIS — R10.84 GENERALIZED ABDOMINAL PAIN: Primary | ICD-10-CM

## 2023-12-17 DIAGNOSIS — R10.9 ABDOMINAL PAIN: ICD-10-CM

## 2023-12-17 LAB
B-HCG UR QL: NEGATIVE
BACTERIA #/AREA URNS HPF: ABNORMAL /HPF
BILIRUB UR QL STRIP: NEGATIVE
CLARITY UR: CLEAR
COLOR UR: YELLOW
CTP QC/QA: YES
GLUCOSE UR QL STRIP: NEGATIVE
HGB UR QL STRIP: NEGATIVE
HYALINE CASTS #/AREA URNS LPF: 0 /LPF
KETONES UR QL STRIP: NEGATIVE
LEUKOCYTE ESTERASE UR QL STRIP: NEGATIVE
MICROSCOPIC COMMENT: ABNORMAL
NITRITE UR QL STRIP: NEGATIVE
PH UR STRIP: 6 [PH] (ref 5–8)
PROT UR QL STRIP: ABNORMAL
RBC #/AREA URNS HPF: 1 /HPF (ref 0–4)
SP GR UR STRIP: 1.02 (ref 1–1.03)
SQUAMOUS #/AREA URNS HPF: 3 /HPF
URN SPEC COLLECT METH UR: ABNORMAL
UROBILINOGEN UR STRIP-ACNC: NEGATIVE EU/DL
WBC #/AREA URNS HPF: 7 /HPF (ref 0–5)

## 2023-12-17 PROCEDURE — 81000 URINALYSIS NONAUTO W/SCOPE: CPT | Performed by: EMERGENCY MEDICINE

## 2023-12-17 PROCEDURE — 81025 URINE PREGNANCY TEST: CPT | Performed by: EMERGENCY MEDICINE

## 2023-12-17 PROCEDURE — 96372 THER/PROPH/DIAG INJ SC/IM: CPT | Performed by: EMERGENCY MEDICINE

## 2023-12-17 PROCEDURE — 25000003 PHARM REV CODE 250: Performed by: EMERGENCY MEDICINE

## 2023-12-17 PROCEDURE — 99284 EMERGENCY DEPT VISIT MOD MDM: CPT

## 2023-12-17 PROCEDURE — 63600175 PHARM REV CODE 636 W HCPCS: Performed by: EMERGENCY MEDICINE

## 2023-12-17 RX ORDER — PROCHLORPERAZINE EDISYLATE 5 MG/ML
10 INJECTION INTRAMUSCULAR; INTRAVENOUS
Status: COMPLETED | OUTPATIENT
Start: 2023-12-17 | End: 2023-12-17

## 2023-12-17 RX ORDER — LACTULOSE 10 G/15ML
10 SOLUTION ORAL 2 TIMES DAILY PRN
Qty: 150 ML | Refills: 0 | Status: SHIPPED | OUTPATIENT
Start: 2023-12-17

## 2023-12-17 RX ORDER — METOCLOPRAMIDE 10 MG/1
10 TABLET ORAL EVERY 6 HOURS PRN
Qty: 14 TABLET | Refills: 0 | Status: SHIPPED | OUTPATIENT
Start: 2023-12-17

## 2023-12-17 RX ORDER — DICYCLOMINE HYDROCHLORIDE 20 MG/1
20 TABLET ORAL 3 TIMES DAILY PRN
Qty: 14 TABLET | Refills: 0 | Status: SHIPPED | OUTPATIENT
Start: 2023-12-17 | End: 2024-01-16

## 2023-12-17 RX ORDER — KETOROLAC TROMETHAMINE 30 MG/ML
30 INJECTION, SOLUTION INTRAMUSCULAR; INTRAVENOUS
Status: COMPLETED | OUTPATIENT
Start: 2023-12-17 | End: 2023-12-17

## 2023-12-17 RX ORDER — MAG HYDROX/ALUMINUM HYD/SIMETH 200-200-20
30 SUSPENSION, ORAL (FINAL DOSE FORM) ORAL ONCE
Status: COMPLETED | OUTPATIENT
Start: 2023-12-17 | End: 2023-12-17

## 2023-12-17 RX ADMIN — ALUMINUM HYDROXIDE, MAGNESIUM HYDROXIDE, AND SIMETHICONE 30 ML: 200; 200; 20 SUSPENSION ORAL at 07:12

## 2023-12-17 RX ADMIN — PROCHLORPERAZINE EDISYLATE 10 MG: 5 INJECTION INTRAMUSCULAR; INTRAVENOUS at 07:12

## 2023-12-17 RX ADMIN — KETOROLAC TROMETHAMINE 30 MG: 30 INJECTION, SOLUTION INTRAMUSCULAR; INTRAVENOUS at 07:12

## 2023-12-18 NOTE — ED PROVIDER NOTES
Encounter Date: 2023       History     Chief Complaint   Patient presents with    Possible Pregnancy     Two months late for menses, had some spotting but no menstral cycle, lower abdominal pain, hx of tube tied.      48-year-old female presents emergency department complaining of intermittent abdominal cramping for 2 months.  States she has not had her menstrual cycle in 2 months.  She has had home UPT that are negative.  Reports history of tubal ligation.  Notes intermittent, generalized lower abdominal cramping pain.  Occasionally has nausea.  Also reports some increased urinary frequency and occasional dysuria.  Denies any fever.  No other symptoms reported at this time.      Review of patient's allergies indicates:  No Known Allergies  Past Medical History:   Diagnosis Date    Depression     Diabetes mellitus     Hyperlipidemia     Hypertension     Thyroid disease     possible hypthyroid disease     Past Surgical History:   Procedure Laterality Date     SECTION      TUBAL LIGATION       Family History   Problem Relation Age of Onset    Breast cancer Maternal Cousin      Social History     Tobacco Use    Smoking status: Never    Smokeless tobacco: Never   Substance Use Topics    Alcohol use: Yes     Comment: OCC    Drug use: No     Review of Systems   Constitutional:  Negative for chills and fever.   HENT:  Negative for congestion.    Respiratory:  Negative for cough and shortness of breath.    Cardiovascular:  Negative for chest pain.   Gastrointestinal:  Positive for abdominal pain.   Musculoskeletal:  Negative for back pain.   Neurological:  Negative for light-headedness and headaches.       Physical Exam     Initial Vitals [23 1754]   BP Pulse Resp Temp SpO2   (!) 146/66 91 20 98 °F (36.7 °C) 100 %      MAP       --         Physical Exam    Nursing note and vitals reviewed.  Constitutional: She appears well-developed and well-nourished. No distress.   HENT:   Head: Normocephalic and  atraumatic.   Eyes: Conjunctivae and EOM are normal. Pupils are equal, round, and reactive to light.   Neck: Neck supple. No tracheal deviation present.   Normal range of motion.  Cardiovascular:  Normal rate and intact distal pulses.           Pulmonary/Chest: No respiratory distress.   Abdominal: Abdomen is soft. She exhibits no distension. There is abdominal tenderness (Minimal, lower abdomen). There is no rebound and no guarding.   Musculoskeletal:         General: No tenderness. Normal range of motion.      Cervical back: Normal range of motion and neck supple.     Neurological: She is alert and oriented to person, place, and time. She has normal strength. No cranial nerve deficit. GCS score is 15. GCS eye subscore is 4. GCS verbal subscore is 5. GCS motor subscore is 6.   Skin: Skin is warm and dry.         ED Course   Procedures  Labs Reviewed   URINALYSIS - Abnormal; Notable for the following components:       Result Value    Protein, UA 1+ (*)     All other components within normal limits   URINALYSIS MICROSCOPIC - Abnormal; Notable for the following components:    WBC, UA 7 (*)     All other components within normal limits   POCT URINE PREGNANCY              X-Rays:   Independently Interpreted Readings:   Other Readings:  X-ray abdomen flat and erect independently interpreted by me pending radiology review:  Nonobstructive bowel gas pattern, no free air    Imaging Results              X-Ray Abdomen Flat And Erect (Final result)  Result time 12/17/23 19:36:52      Final result by Franki Lobo DO (12/17/23 19:36:52)                   Impression:      No acute abnormality.      Electronically signed by: Franki Lobo  Date:    12/17/2023  Time:    19:36               Narrative:    EXAMINATION:  XR ABDOMEN FLAT AND ERECT    CLINICAL HISTORY:  Unspecified abdominal pain    TECHNIQUE:  Flat and erect AP views of the abdomen were performed.    COMPARISON:  05/02/2014.    FINDINGS:  There is a normal,  nonobstructive bowel gas pattern.  There is a mild to moderate volume of stool.  There is no free air.  There is no abnormal calcification. The visualized osseous structures demonstrate degenerative changes.  The visualized lung bases are clear.                                      Medications   ketorolac injection 30 mg (30 mg Intramuscular Given 12/17/23 1909)   prochlorperazine injection Soln 10 mg (10 mg Intramuscular Given 12/17/23 1910)   aluminum-magnesium hydroxide-simethicone 200-200-20 mg/5 mL suspension 30 mL (30 mLs Oral Given 12/17/23 1908)     Medical Decision Making  48-year-old female presents emergency department complaining of abdominal pain      Differential: Diverticulitis, cholecystitis, pancreatitis, appendicitis, obstruction, constipation UTI, pyelonephritis, kidney stone, gastroenteritis, pregnancy, threatened versus inevitable versus missed miscarriage, ectopic        Labs and imaging benign.  Patient given IM Toradol and Compazine as well as a GI cocktail.  Tolerating p.o. and reports significant improvement in symptoms.  Vital signs stable.  Informed patient and family results as well as plan to discharge with prescriptions for Bentyl, Reglan, lactulose, instructed on home management, over-the-counter medications, follow up with primary care physician and/or OBGYN, strict return precautions given.  Patient and family expressed good understanding and are comfortable with discharge at this time.    Problems Addressed:  Generalized abdominal pain: acute illness or injury    Amount and/or Complexity of Data Reviewed  External Data Reviewed: notes.     Details: Reviewed most recent PCP note documenting baseline medications and past medical history  Labs: ordered.     Details: UPT negative; UA without overt signs of infection  Radiology: ordered and independent interpretation performed. Decision-making details documented in ED Course.    Risk  OTC drugs.  Prescription drug  management.  Parenteral controlled substances.                                      Clinical Impression:  Final diagnoses:  [R10.9] Abdominal pain  [R10.84] Generalized abdominal pain (Primary)          ED Disposition Condition    Discharge Stable          ED Prescriptions       Medication Sig Dispense Start Date End Date Auth. Provider    dicyclomine (BENTYL) 20 mg tablet Take 1 tablet (20 mg total) by mouth 3 (three) times daily as needed (for abdominal cramping). 14 tablet 12/17/2023 1/16/2024 Ismael Bazan MD    metoclopramide HCl (REGLAN) 10 MG tablet Take 1 tablet (10 mg total) by mouth every 6 (six) hours as needed (Nausea). 14 tablet 12/17/2023 -- Ismael Bazan MD    lactulose (CHRONULAC) 20 gram/30 mL Soln Take 15 mLs (10 g total) by mouth 2 (two) times daily as needed (Constipation). 150 mL 12/17/2023 -- Ismael Bazan MD          Follow-up Information       Follow up With Specialties Details Why Contact Info    Calli Sparks MD Cardiology Schedule an appointment as soon as possible for a visit   [address] [facility] [address]  [phone]      Brielle Sanches MD Obstetrics, Obstetrics and Gynecology Schedule an appointment as soon as possible for a visit  Or with your own OBGYN [address] [facility] [place]  [address] [place] [address] [id number] [phone]               Ismael Bazan MD  12/17/23 4450

## 2023-12-18 NOTE — DISCHARGE INSTRUCTIONS
Please make sure you are drinking plenty of fluids.  I recommend taking ibuprofen 600 mg every 6 hours with food and/or Tylenol 650 mg every 6 hours in addition to the prescribed medication as needed for pain.  You may also want to consider over-the-counter glycerin suppositories in conjunction with prescribed medication as needed for constipation.  Please arrange for a follow-up appointment with your primary care physician and/or OBGYN.  Please return with any new or worsening symptoms.

## 2024-01-05 ENCOUNTER — OFFICE VISIT (OUTPATIENT)
Dept: OBSTETRICS AND GYNECOLOGY | Facility: CLINIC | Age: 49
End: 2024-01-05
Payer: MEDICARE

## 2024-01-05 ENCOUNTER — HOSPITAL ENCOUNTER (OUTPATIENT)
Dept: RADIOLOGY | Facility: HOSPITAL | Age: 49
Discharge: HOME | End: 2024-01-05
Payer: MEDICARE

## 2024-01-05 VITALS — WEIGHT: 293 LBS | BODY MASS INDEX: 67.89 KG/M2 | SYSTOLIC BLOOD PRESSURE: 130 MMHG | DIASTOLIC BLOOD PRESSURE: 82 MMHG

## 2024-01-05 DIAGNOSIS — N92.6 IRREGULAR BLEEDING: ICD-10-CM

## 2024-01-05 DIAGNOSIS — N92.6 IRREGULAR BLEEDING: Primary | ICD-10-CM

## 2024-01-05 PROCEDURE — 99213 OFFICE O/P EST LOW 20 MIN: CPT | Mod: S$GLB,,,

## 2024-01-05 PROCEDURE — 99999 PR PBB SHADOW E&M-EST. PATIENT-LVL III: CPT | Mod: PBBFAC,,,

## 2024-01-05 PROCEDURE — 76830 TRANSVAGINAL US NON-OB: CPT | Mod: 26,,, | Performed by: INTERNAL MEDICINE

## 2024-01-05 PROCEDURE — 76830 TRANSVAGINAL US NON-OB: CPT | Mod: TC

## 2024-01-05 PROCEDURE — 76856 US EXAM PELVIC COMPLETE: CPT | Mod: 26,,, | Performed by: INTERNAL MEDICINE

## 2024-01-05 NOTE — PROGRESS NOTES
Leticia Law is a 48 y.o. female  presents with complaint of amenorrhea since x3 months. Earlier last year, she also missed her cycle for approximately 2 months. History of BTL. UPT negative. She denies hot flashes, night sweats.    She also complains of lower abdominal pain/cramping and was told in the past she had an ovarian cyst. She went ot the ED on  for the abdominal pain and symptoms improved with IM Toradol, compazine, and GI cocktail. Discharged with Bentyl, Reglan, and lactulose. She states she is now having a bm daily. Denies constipation, diarrhea, nausea/vomiting.    Review of pertinent 23 labs:  TSH: elevated 8.49  TPO: elevated >900  Free T4: 0.8    States she is being managed by PCP and has follow up scheduled.    Past Medical History:   Diagnosis Date    Depression     Diabetes mellitus     Hyperlipidemia     Hypertension     Thyroid disease     possible hypthyroid disease     Past Surgical History:   Procedure Laterality Date     SECTION      TUBAL LIGATION       Social History     Tobacco Use    Smoking status: Never    Smokeless tobacco: Never   Substance Use Topics    Alcohol use: Yes     Comment: OCC    Drug use: No     OB History    Para Term  AB Living   8 6 3   2 3   SAB IAB Ectopic Multiple Live Births   2       3      # Outcome Date GA Lbr Cyrus/2nd Weight Sex Delivery Anes PTL Lv   8 Para //08    M Vag-Spont   CHRISTY   7 Para //    F Vag-Spont   CHRISTY   6 Para //    F Vag-Spont   CHRISTY   5 Term            4 Term            3 Term            2 SAB            1 SAB                /82   Wt (!) 179.4 kg (395 lb 8.1 oz)   LMP 10/25/2023 (Approximate)   BMI 67.89 kg/m²     ROS:  GENERAL: No fever, chills, fatigability or weight loss.  ABDOMEN: No abdominal pain. Denies nausea or vomiting. No diarrhea or constipation  URINARY: No frequency, dysuria, hematuria.  VULVOVAGINAL: See HPI.  NEUROLOGICAL: No headaches. No vision  changes.    PHYSICAL EXAM:  APPEARANCE: Well nourished, well developed, in no acute distress.  AFFECT: WNL, alert and oriented x 3  SKIN: No acne or hirsutism  CHEST: Good respiratory effect  PELVIC: Deferred   EXTREMITIES: No edema.    ASSESSMENT and PLAN:  1. Irregular bleeding  FOLLICLE STIMULATING HORMONE    US Pelvis Comp with Transvag NON-OB (xpd        Discussed she may be entering irma-menopause, work up with FSH and U/S. Advised to continue following up with PCP for potential thyroid and/or GI issues.      Follow up pending results.     Patient confirms understanding of encounter and all medical questions answered.

## 2024-01-31 ENCOUNTER — PATIENT MESSAGE (OUTPATIENT)
Dept: OBSTETRICS AND GYNECOLOGY | Facility: CLINIC | Age: 49
End: 2024-01-31
Payer: MEDICARE

## 2024-04-11 ENCOUNTER — PATIENT MESSAGE (OUTPATIENT)
Dept: OBSTETRICS AND GYNECOLOGY | Facility: CLINIC | Age: 49
End: 2024-04-11
Payer: MEDICARE

## 2024-04-27 ENCOUNTER — HOSPITAL ENCOUNTER (EMERGENCY)
Facility: HOSPITAL | Age: 49
Discharge: HOME | End: 2024-04-27
Attending: EMERGENCY MEDICINE
Payer: MEDICARE

## 2024-04-27 VITALS
HEIGHT: 71 IN | RESPIRATION RATE: 18 BRPM | HEART RATE: 62 BPM | SYSTOLIC BLOOD PRESSURE: 153 MMHG | DIASTOLIC BLOOD PRESSURE: 76 MMHG | BODY MASS INDEX: 41.02 KG/M2 | OXYGEN SATURATION: 99 % | TEMPERATURE: 98 F | WEIGHT: 293 LBS

## 2024-04-27 DIAGNOSIS — M54.6 ACUTE RIGHT-SIDED THORACIC BACK PAIN: Primary | ICD-10-CM

## 2024-04-27 DIAGNOSIS — R10.9 RIGHT FLANK PAIN: ICD-10-CM

## 2024-04-27 LAB
ALBUMIN SERPL BCP-MCNC: 3.6 G/DL (ref 3.5–5.2)
ALP SERPL-CCNC: 83 U/L (ref 38–126)
ALT SERPL W/O P-5'-P-CCNC: 27 U/L (ref 10–44)
ANION GAP SERPL CALC-SCNC: 7 MMOL/L (ref 8–16)
AST SERPL-CCNC: 28 U/L (ref 15–46)
B-HCG UR QL: NEGATIVE
BASOPHILS # BLD AUTO: 0.01 K/UL (ref 0–0.2)
BASOPHILS NFR BLD: 0.2 % (ref 0–1.9)
BILIRUB SERPL-MCNC: 0.3 MG/DL (ref 0.1–1)
BILIRUB UR QL STRIP: NEGATIVE
CALCIUM SERPL-MCNC: 8.7 MG/DL (ref 8.7–10.5)
CHLORIDE SERPL-SCNC: 110 MMOL/L (ref 95–110)
CLARITY UR REFRACT.AUTO: CLEAR
CO2 SERPL-SCNC: 24 MMOL/L (ref 23–29)
COLOR UR AUTO: YELLOW
CREAT SERPL-MCNC: 0.96 MG/DL (ref 0.5–1.4)
CTP QC/QA: YES
DIFFERENTIAL METHOD BLD: ABNORMAL
EOSINOPHIL # BLD AUTO: 0.3 K/UL (ref 0–0.5)
EOSINOPHIL NFR BLD: 7.3 % (ref 0–8)
ERYTHROCYTE [DISTWIDTH] IN BLOOD BY AUTOMATED COUNT: 13.3 % (ref 11.5–14.5)
EST. GFR  (NO RACE VARIABLE): >60 ML/MIN/1.73 M^2
GLUCOSE SERPL-MCNC: 122 MG/DL (ref 70–110)
GLUCOSE UR QL STRIP: NEGATIVE
HCT VFR BLD AUTO: 34 % (ref 37–48.5)
HGB BLD-MCNC: 10.4 G/DL (ref 12–16)
HGB UR QL STRIP: ABNORMAL
IMM GRANULOCYTES # BLD AUTO: 0.01 K/UL (ref 0–0.04)
IMM GRANULOCYTES NFR BLD AUTO: 0.2 % (ref 0–0.5)
KETONES UR QL STRIP: NEGATIVE
LEUKOCYTE ESTERASE UR QL STRIP: NEGATIVE
LYMPHOCYTES # BLD AUTO: 1.6 K/UL (ref 1–4.8)
LYMPHOCYTES NFR BLD: 39.4 % (ref 18–48)
MCH RBC QN AUTO: 26.3 PG (ref 27–31)
MCHC RBC AUTO-ENTMCNC: 30.6 G/DL (ref 32–36)
MCV RBC AUTO: 86 FL (ref 82–98)
MICROSCOPIC COMMENT: NORMAL
MONOCYTES # BLD AUTO: 0.2 K/UL (ref 0.3–1)
MONOCYTES NFR BLD: 4.9 % (ref 4–15)
NEUTROPHILS # BLD AUTO: 2 K/UL (ref 1.8–7.7)
NEUTROPHILS NFR BLD: 48 % (ref 38–73)
NITRITE UR QL STRIP: NEGATIVE
NRBC BLD-RTO: 0 /100 WBC
PH UR STRIP: 7 [PH] (ref 5–8)
PLATELET # BLD AUTO: 241 K/UL (ref 150–450)
PMV BLD AUTO: 11 FL (ref 9.2–12.9)
POCT GLUCOSE: 129 MG/DL (ref 70–110)
POTASSIUM SERPL-SCNC: 3.9 MMOL/L (ref 3.5–5.1)
PROT SERPL-MCNC: 7.1 G/DL (ref 6–8.4)
PROT UR QL STRIP: ABNORMAL
RBC # BLD AUTO: 3.96 M/UL (ref 4–5.4)
RBC #/AREA URNS AUTO: 0 /HPF (ref 0–4)
SODIUM SERPL-SCNC: 141 MMOL/L (ref 136–145)
SP GR UR STRIP: 1.02 (ref 1–1.03)
URN SPEC COLLECT METH UR: ABNORMAL
UROBILINOGEN UR STRIP-ACNC: NEGATIVE EU/DL
UUN UR-MCNC: 16 MG/DL (ref 7–17)
WBC # BLD AUTO: 4.11 K/UL (ref 3.9–12.7)

## 2024-04-27 PROCEDURE — 81025 URINE PREGNANCY TEST: CPT | Mod: ER | Performed by: PHYSICIAN ASSISTANT

## 2024-04-27 PROCEDURE — 82962 GLUCOSE BLOOD TEST: CPT | Mod: ER

## 2024-04-27 PROCEDURE — 85025 COMPLETE CBC W/AUTO DIFF WBC: CPT | Mod: ER | Performed by: PHYSICIAN ASSISTANT

## 2024-04-27 PROCEDURE — 81000 URINALYSIS NONAUTO W/SCOPE: CPT | Mod: ER | Performed by: PHYSICIAN ASSISTANT

## 2024-04-27 PROCEDURE — 25000003 PHARM REV CODE 250: Mod: ER | Performed by: PHYSICIAN ASSISTANT

## 2024-04-27 PROCEDURE — 80053 COMPREHEN METABOLIC PANEL: CPT | Mod: ER | Performed by: PHYSICIAN ASSISTANT

## 2024-04-27 PROCEDURE — 99284 EMERGENCY DEPT VISIT MOD MDM: CPT | Mod: 25,ER

## 2024-04-27 RX ORDER — NAPROXEN 500 MG/1
500 TABLET ORAL 2 TIMES DAILY WITH MEALS
Qty: 30 TABLET | Refills: 0 | Status: SHIPPED | OUTPATIENT
Start: 2024-04-27

## 2024-04-27 RX ORDER — LIDOCAINE 50 MG/G
1 PATCH TOPICAL DAILY
Qty: 5 PATCH | Refills: 0 | Status: SHIPPED | OUTPATIENT
Start: 2024-04-27

## 2024-04-27 RX ORDER — KETOROLAC TROMETHAMINE 10 MG/1
10 TABLET, FILM COATED ORAL
Status: COMPLETED | OUTPATIENT
Start: 2024-04-27 | End: 2024-04-27

## 2024-04-27 RX ORDER — METHOCARBAMOL 500 MG/1
500 TABLET, FILM COATED ORAL 3 TIMES DAILY
Qty: 15 TABLET | Refills: 0 | Status: SHIPPED | OUTPATIENT
Start: 2024-04-27 | End: 2024-05-02

## 2024-04-27 RX ADMIN — KETOROLAC TROMETHAMINE 10 MG: 10 TABLET, FILM COATED ORAL at 12:04

## 2024-04-27 NOTE — Clinical Note
"Leticia OLIVA "Armen Law was seen and treated in our emergency department on 4/27/2024.  She may return to work on 04/29/2024.       If you have any questions or concerns, please don't hesitate to call.      Johnny Bowman PA-C"

## 2024-04-27 NOTE — ED PROVIDER NOTES
Encounter Date: 2024       History     Chief Complaint   Patient presents with    Flank Pain     Pt C/O L flank pain with urinary urgency, frequency X 2 days.      49-year-old female, PMH dm, HTN, HLD, presents to ED with concern of right-sided back/flank pain that began 2 days ago.  She states she is also noticed some increase in urine frequency and occasional sensation of incomplete voiding.  No dysuria, hematuria, urine odor, vaginal discharge.  She did recently finish her menstrual cycle.  No abdominal pain, nausea, vomiting, fevers or chills.  She does admit pain does worsen with certain movements and positions but can not improved with rest.  No recent fall or trauma.  No other acute complaints at this time    The history is provided by the patient.     Review of patient's allergies indicates:  No Known Allergies  Past Medical History:   Diagnosis Date    Depression     Diabetes mellitus     Hyperlipidemia     Hypertension     Thyroid disease     possible hypthyroid disease     Past Surgical History:   Procedure Laterality Date     SECTION      TUBAL LIGATION       Family History   Problem Relation Name Age of Onset    Breast cancer Maternal Cousin       Social History     Tobacco Use    Smoking status: Never    Smokeless tobacco: Never   Substance Use Topics    Alcohol use: Yes     Comment: OCC    Drug use: No     Review of Systems   Constitutional:  Negative for chills and fever.   Respiratory:  Negative for cough and shortness of breath.    Cardiovascular:  Negative for chest pain.   Gastrointestinal:  Negative for abdominal pain, diarrhea, nausea and vomiting.   Genitourinary:  Positive for flank pain. Negative for dysuria, hematuria, vaginal discharge and vaginal pain.   Musculoskeletal:  Positive for back pain.   Neurological:  Negative for weakness and numbness.       Physical Exam     Initial Vitals [24 1145]   BP Pulse Resp Temp SpO2   (!) 175/93 65 19 98 °F (36.7 °C) 95 %      MAP        --         Physical Exam    Vitals reviewed.  Constitutional: She appears well-developed and well-nourished. She is active. She does not have a sickly appearance. She does not appear ill. No distress.   HENT:   Head: Normocephalic and atraumatic.   Neck:   Normal range of motion.  Cardiovascular:  Normal rate.           Pulmonary/Chest: Effort normal.   Abdominal: There is no abdominal tenderness.   Musculoskeletal:      Cervical back: Normal range of motion.      Comments: Right-sided midthoracic tenderness.  No skin changes or bruising visualized.     Neurological: She is alert. GCS eye subscore is 4. GCS verbal subscore is 5. GCS motor subscore is 6.   Skin: Skin is warm and dry.   Psychiatric: She has a normal mood and affect. Her speech is normal and behavior is normal.         ED Course   Procedures  Labs Reviewed   CBC W/ AUTO DIFFERENTIAL - Abnormal; Notable for the following components:       Result Value    RBC 3.96 (*)     Hemoglobin 10.4 (*)     Hematocrit 34.0 (*)     MCH 26.3 (*)     MCHC 30.6 (*)     Mono # 0.2 (*)     All other components within normal limits   COMPREHENSIVE METABOLIC PANEL - Abnormal; Notable for the following components:    Glucose 122 (*)     Anion Gap 7 (*)     All other components within normal limits   URINALYSIS, REFLEX TO URINE CULTURE - Abnormal; Notable for the following components:    Protein, UA Trace (*)     Occult Blood UA 2+ (*)     All other components within normal limits    Narrative:     Preferred Collection Type->Urine, Clean Catch  Specimen Source->Urine   POCT GLUCOSE - Abnormal; Notable for the following components:    POCT Glucose 129 (*)     All other components within normal limits   URINALYSIS MICROSCOPIC    Narrative:     Preferred Collection Type->Urine, Clean Catch  Specimen Source->Urine   POCT URINE PREGNANCY          Imaging Results              US Retroperitoneal Complete (Final result)  Result time 04/27/24 14:00:25   Procedure changed from US  Abdomen Pelvis Doppler Study Limited (retroperitoneal)     Final result by Ashok Nash MD (04/27/24 14:00:25)                   Impression:      1.  Normal renal ultrasound.  Negative for hydronephrosis.  Negative for renal lesions.  Negative for renal stones.    2.  Fatty infiltration of the liver.      Electronically signed by: Ashok Nash MD  Date:    04/27/2024  Time:    14:00               Narrative:    EXAMINATION:  US RETROPERITONEAL COMPLETE    CLINICAL HISTORY:  flank pain; Unspecified abdominal pain    TECHNIQUE:  Ultrasound of the kidneys and urinary bladder was performed including color flow and Doppler evaluation of the kidneys.    COMPARISON:  None.    FINDINGS:  Right kidney: The right kidney measures 10.6 cm. No cortical thinning. No loss of corticomedullary distinction. Resistive index measures 0.57.  No mass. No renal stone. No hydronephrosis.    Left kidney: The left kidney measures 10.4 cm. No cortical thinning. No loss of corticomedullary distinction. Resistive index measures 0.56.  No mass. No renal stone. No hydronephrosis.    The bladder is partially distended at the time of scanning and has an unremarkable appearance.    There is fatty infiltration of the liver.                                       Medications   ketorolac tablet 10 mg (10 mg Oral Given 4/27/24 1204)     Medical Decision Making  Patient presents with concern of right-sided back/flank pain that she states began 2 days ago.  She does report increase in urine frequency but no dysuria, fevers, chills, nausea or vomiting.  Afebrile arrival.  Patient no distress on exam.  Reproducible tenderness to mid right thoracic region.    DDx:  Including but not limited to strain, sprain, spasm, radiculopathy, neuropathy, UTI, pyelonephritis, nephrolithiasis    Amount and/or Complexity of Data Reviewed  Labs:  Decision-making details documented in ED Course.  Radiology: ordered. Decision-making details documented in ED  Course.    Risk  Prescription drug management.               ED Course as of 04/27/24 1416   Sat Apr 27, 2024   1247 CBC auto differential(!)  No elevation in WBC.  H/H appearing stable. [KS]   1248 Comprehensive metabolic panel(!)  No significant electrolyte abnormality.  Creatinine WNL. [KS]   1248 POCT urine pregnancy  Negative [KS]   1248 POCT glucose(!)  Grossly unremarkable at 129 [KS]   1329 Urinalysis, Reflex to Urine Culture Urine, Clean Catch(!)  2+ blood.  Nitrite negative with no leukocytes or evidence of urinary infection.  Blood may be secondary to recent menstrual cycle.  Unable to proceed with CT renal due to body habitus.  Will proceed with renal ultrasound for further eval.  Pain appearing controlled with ED intervention. [KS]   1413 US Retroperitoneal Complete  Per radiology review:   Normal renal ultrasound.  Negative for hydronephrosis.  Negative for renal lesions.  Negative for renal stones. [KS]   1413 Patient continues to rest comfortably.  With careful consideration, I do have high suspicion symptoms are musculoskeletal and will continue with supportive care.  Prescriptions as written below.  Encouraged ice/heat, stretches and movements as tolerated with PCP follow-up.  ED return precautions were discussed.  Patient states her understanding and agrees with plan. [KS]      ED Course User Index  [KS] Johnny Bowman PA-C                           Clinical Impression:  Final diagnoses:  [R10.9] Right flank pain  [M54.6] Acute right-sided thoracic back pain (Primary)          ED Disposition Condition    Discharge Stable          ED Prescriptions       Medication Sig Dispense Start Date End Date Auth. Provider    naproxen (NAPROSYN) 500 MG tablet Take 1 tablet (500 mg total) by mouth 2 (two) times daily with meals. 30 tablet 4/27/2024 -- Johnny Bowman PA-C    LIDOcaine (LIDODERM) 5 % Place 1 patch onto the skin once daily. Remove & Discard patch within 12 hours or as directed by MD 5 patch  4/27/2024 -- Johnny Bowman PA-C    methocarbamoL (ROBAXIN) 500 MG Tab Take 1 tablet (500 mg total) by mouth 3 (three) times daily. for 5 days 15 tablet 4/27/2024 5/2/2024 Johnny Bowman PA-C          Follow-up Information    None          Johnny Bowman PA-C  04/27/24 [id number]

## 2024-04-27 NOTE — FIRST PROVIDER EVALUATION
Emergency Department TeleTriage Encounter Note      CHIEF COMPLAINT    Chief Complaint   Patient presents with    Flank Pain     Pt C/O L flank pain with urinary urgency, frequency X 2 days.        VITAL SIGNS   Initial Vitals [04/27/24 1145]   BP Pulse Resp Temp SpO2   (!) 175/93 65 19 98 °F (36.7 °C) 95 %      MAP       --            ALLERGIES    Review of patient's allergies indicates:  No Known Allergies    PROVIDER TRIAGE NOTE  Patient presents with right flank pain, urinary frequency and urgency. No history of renal stones.       ORDERS  Labs Reviewed - No data to display    ED Orders (720h ago, onward)      None              Virtual Visit Note: The provider triage portion of this emergency department evaluation and documentation was performed via [employer], a HIPAA-compliant telemedicine application, in concert with a tele-presenter in the room. A face to face patient evaluation with one of my colleagues will occur once the patient is placed in an emergency department room.      DISCLAIMER: This note was prepared with [employer] voice recognition transcription software. Garbled syntax, mangled pronouns, and other bizarre constructions may be attributed to that software system.

## 2024-04-27 NOTE — DISCHARGE INSTRUCTIONS

## 2024-04-27 NOTE — Clinical Note
"Leticia OLIVA "Armen Law was seen and treated in our emergency department on 4/27/2024.  She may return to work on 04/28/2024.       If you have any questions or concerns, please don't hesitate to call.      Johnny Bowman PA-C"

## 2024-04-27 NOTE — ED NOTES
PT presents to the ED with C/O right sided side/flank pain x 3 days. Pain worsens with movement. +urinary frequency and hesitancy. Denies any other C/O. Afebrile. VSS.

## 2024-08-05 ENCOUNTER — OFFICE VISIT (OUTPATIENT)
Dept: FAMILY MEDICINE | Facility: HOSPITAL | Age: 49
End: 2024-08-05
Payer: MEDICARE

## 2024-08-05 ENCOUNTER — TELEPHONE (OUTPATIENT)
Dept: BARIATRICS | Facility: CLINIC | Age: 49
End: 2024-08-05
Payer: MEDICARE

## 2024-08-05 VITALS
WEIGHT: 293 LBS | BODY MASS INDEX: 41.02 KG/M2 | HEART RATE: 84 BPM | DIASTOLIC BLOOD PRESSURE: 75 MMHG | SYSTOLIC BLOOD PRESSURE: 139 MMHG | HEIGHT: 71 IN

## 2024-08-05 DIAGNOSIS — R73.03 PREDIABETES: ICD-10-CM

## 2024-08-05 DIAGNOSIS — Z12.31 ENCOUNTER FOR SCREENING MAMMOGRAM FOR MALIGNANT NEOPLASM OF BREAST: ICD-10-CM

## 2024-08-05 DIAGNOSIS — F41.0 PANIC DISORDER: ICD-10-CM

## 2024-08-05 DIAGNOSIS — R06.02 SOB (SHORTNESS OF BREATH) ON EXERTION: ICD-10-CM

## 2024-08-05 DIAGNOSIS — E66.01 MORBID OBESITY WITH BMI OF 50.0-59.9, ADULT: ICD-10-CM

## 2024-08-05 DIAGNOSIS — I10 PRIMARY HYPERTENSION: Primary | ICD-10-CM

## 2024-08-05 PROCEDURE — 99214 OFFICE O/P EST MOD 30 MIN: CPT

## 2024-08-05 RX ORDER — METFORMIN HYDROCHLORIDE 500 MG/1
500 TABLET ORAL
Qty: 30 TABLET | Refills: 0 | Status: SHIPPED | OUTPATIENT
Start: 2024-08-05 | End: 2025-08-05

## 2024-08-05 RX ORDER — HYDROCHLOROTHIAZIDE 12.5 MG/1
12.5 TABLET ORAL DAILY
Status: CANCELLED | OUTPATIENT
Start: 2024-08-05

## 2024-08-05 RX ORDER — ATORVASTATIN CALCIUM 40 MG/1
40 TABLET, FILM COATED ORAL DAILY
Qty: 90 TABLET | Refills: 3 | Status: SHIPPED | OUTPATIENT
Start: 2024-08-05 | End: 2025-08-05

## 2024-08-05 RX ORDER — HYDROCHLOROTHIAZIDE 25 MG/1
25 TABLET ORAL DAILY
Qty: 30 TABLET | Refills: 2 | Status: SHIPPED | OUTPATIENT
Start: 2024-08-05 | End: 2024-11-03

## 2024-08-05 RX ORDER — PROPRANOLOL HYDROCHLORIDE 10 MG/1
10 TABLET ORAL 3 TIMES DAILY
Qty: 90 TABLET | Refills: 2 | Status: SHIPPED | OUTPATIENT
Start: 2024-08-05 | End: 2025-08-05

## 2024-08-08 ENCOUNTER — TELEPHONE (OUTPATIENT)
Dept: FAMILY MEDICINE | Facility: HOSPITAL | Age: 49
End: 2024-08-08
Payer: MEDICARE

## 2024-08-08 DIAGNOSIS — E11.9 TYPE 2 DIABETES MELLITUS WITHOUT COMPLICATION, WITHOUT LONG-TERM CURRENT USE OF INSULIN: ICD-10-CM

## 2024-08-08 DIAGNOSIS — E03.9 HYPOTHYROIDISM, UNSPECIFIED TYPE: Primary | ICD-10-CM

## 2024-08-08 RX ORDER — LEVOTHYROXINE SODIUM 200 UG/1
200 TABLET ORAL
Qty: 30 TABLET | Refills: 2 | Status: SHIPPED | OUTPATIENT
Start: 2024-08-08 | End: 2024-11-06

## 2024-08-08 RX ORDER — TIRZEPATIDE 2.5 MG/.5ML
2.5 INJECTION, SOLUTION SUBCUTANEOUS
Qty: 2 ML | Refills: 2 | Status: SHIPPED | OUTPATIENT
Start: 2024-08-08 | End: 2024-11-06

## 2024-08-16 ENCOUNTER — TELEPHONE (OUTPATIENT)
Dept: BARIATRICS | Facility: CLINIC | Age: 49
End: 2024-08-16
Payer: MEDICARE

## 2024-09-03 ENCOUNTER — TELEPHONE (OUTPATIENT)
Dept: BARIATRICS | Facility: CLINIC | Age: 49
End: 2024-09-03
Payer: MEDICARE

## 2024-09-25 ENCOUNTER — OFFICE VISIT (OUTPATIENT)
Dept: FAMILY MEDICINE | Facility: HOSPITAL | Age: 49
End: 2024-09-25
Payer: MEDICARE

## 2024-09-25 ENCOUNTER — TELEPHONE (OUTPATIENT)
Dept: BARIATRICS | Facility: CLINIC | Age: 49
End: 2024-09-25
Payer: MEDICARE

## 2024-09-25 VITALS
WEIGHT: 293 LBS | BODY MASS INDEX: 41.02 KG/M2 | DIASTOLIC BLOOD PRESSURE: 88 MMHG | HEIGHT: 71 IN | SYSTOLIC BLOOD PRESSURE: 150 MMHG

## 2024-09-25 DIAGNOSIS — Z12.11 ENCOUNTER FOR SCREENING FOR MALIGNANT NEOPLASM OF COLON: ICD-10-CM

## 2024-09-25 DIAGNOSIS — E66.01 MORBID OBESITY WITH BMI OF 50.0-59.9, ADULT: ICD-10-CM

## 2024-09-25 DIAGNOSIS — I10 PRIMARY HYPERTENSION: ICD-10-CM

## 2024-09-25 DIAGNOSIS — E11.9 TYPE 2 DIABETES MELLITUS WITHOUT COMPLICATION, WITHOUT LONG-TERM CURRENT USE OF INSULIN: Primary | ICD-10-CM

## 2024-09-25 DIAGNOSIS — E03.9 HYPOTHYROIDISM, UNSPECIFIED TYPE: ICD-10-CM

## 2024-09-25 PROBLEM — R73.03 PREDIABETES: Status: RESOLVED | Noted: 2024-08-05 | Resolved: 2024-09-25

## 2024-09-25 PROCEDURE — 99214 OFFICE O/P EST MOD 30 MIN: CPT

## 2024-09-25 RX ORDER — ENALAPRIL MALEATE AND HYDROCHLOROTHIAZIDE 10; 25 MG/1; MG/1
1 TABLET ORAL DAILY
Qty: 90 TABLET | Refills: 3 | Status: SHIPPED | OUTPATIENT
Start: 2024-09-25 | End: 2025-09-25

## 2024-09-25 RX ORDER — TIRZEPATIDE 5 MG/.5ML
5 INJECTION, SOLUTION SUBCUTANEOUS
Qty: 2 ML | Refills: 2 | Status: SHIPPED | OUTPATIENT
Start: 2024-09-25 | End: 2024-12-24

## 2024-09-25 NOTE — PROGRESS NOTES
[facility]    Subjective:     Leticia Law is a 49 y.o. year old female with PMHx of HTN, T2DM, HLD, obesity, depression, anxiety, and hypothyroidism who presents to clinic for follow up.    Desires bariatric surgery. Jose Jo called her but prefers [facility], will send referral there via fax. States she was trying to put it off but feels like a lot of her problems are due to her weight especially her breathing given workup of lungs and cardiac have been overall unrevealing. Now currently on Mounjaro for her diabetes which she has not seen much improvement in weight loss thus far.     HTN: BP today 170/92 with repeat 150/88. States usually controlled at home but unable to state what it usually runs. Does endorse having a diet where she eats whatever she wants and salty foods. Patient denies any SOB, lightheadedness, dizziness, palpitations, chest pain, or vision changes. Patient endorses compliance with medication regimen, including HCTZ 25 mg.     DM:Last A1c 7.2% (24); patient does not complain of numbness/tingling in bilateral feet. Patient does not endorse smoking. Patient denies polyuria, polydipsia, polyphagia. Patient endorses compliance with medication regimen, including Metformin and Mounjaro 2.5/weekly. Denies any episodes of hypoglycemia.    Patient Active Problem List    Diagnosis Date Noted    Morbid obesity with BMI of 50.0-59.9, adult 2024    HTN (hypertension) 10/21/2014    DM (diabetes mellitus) 10/21/2014    Hypercholesteremia 10/21/2014        Review of patient's allergies indicates:  No Known Allergies     Past Medical History:   Diagnosis Date    Depression     Diabetes mellitus     Hyperlipidemia     Hypertension     Thyroid disease     possible hypthyroid disease      Past Surgical History:   Procedure Laterality Date     SECTION      TUBAL LIGATION        Family History   Problem Relation Name Age of Onset    Breast cancer Maternal Cousin       "  Social History     Tobacco Use    Smoking status: Never    Smokeless tobacco: Never   Substance Use Topics    Alcohol use: Yes     Comment: OCC        Objective:     Vitals:    09/25/24 1531   BP: (!) 150/88   Weight: (!) 189.5 kg (417 lb 12.4 oz)   Height: 5' 11" (1.803 m)     Body mass index is 58.27 kg/m².    Gen: No apparent distress, well nourished and developed, appears stated age  CV: RRR, S1 and S2 present, no LE edema  Resp: CTAB, normal respiratory effort    Assessment/Plan:     Leticia Law is a 49 y.o. year old female who presents to clinic for follow up.    1. Type 2 diabetes mellitus without complication, without long-term current use of insulin  - Improving with current regimen. Last A1c 7.2 in August 2024  - Will increase Mounjaro from 2.5 to 5 mg/weekly for better control tirzepatide (MOUNJARO) 5 mg/0.5 mL PnIj; Inject 5 mg into the skin every 7 days.  Dispense: 2 mL; Refill: 2    2. Primary hypertension  - Not controlled   - Discontinue HCTZ 25 mg daily. Will start patient on enalapriL-hydrochlorothiazide (VASERETIC) 10-25 mg per tablet for better control; Take 1 tablet by mouth once daily.  Dispense: 90 tablet; Refill: 3. Discussed risks and benefits of medication with patient. Discussed common side effects of medication. Handout also given on the medication. All questions answered.    3. Morbid obesity with BMI of 50.0-59.9, adult  - Counseled patient on the importance maintaining a healthy diet (including at least one-half of food eaten should be whole fruits and vegetables with the core elements of the other half of food  should include grains, whole grains, dairy, protein, and oils with lower saturated fat, as well as minimizing alcohol use and consumption of foods with added sugar, saturated fat, and sodium.)   - Counseled patient on importance of exercising at least 150-300 minutes per week (i.e. at least 30 minutes, 3 days a week) of moderate physical activity.   - Ambulatory " referral/consult to Bariatric Surgery to [facility] per patient preference    4. Encounter for screening for malignant neoplasm of colon  - Ambulatory referral/consult to Endo Procedure ; Future    5. Hypothyroidism  - Started on levothyroxine 200 mcg in August 2024  - Will re-check TSH today along with thyroid peroxidase antibody     Follow-up:1 month for BP, DM    A total of 35 minutes was spent on patient care during this encounter which included chart review, examining the patient, formulating a treatment plan and documentation.      Case discussed with staff: BRAVO Israel MD  [facility], PGY-3  09/25/2024

## 2024-10-25 ENCOUNTER — HOSPITAL ENCOUNTER (OUTPATIENT)
Dept: RADIOLOGY | Facility: HOSPITAL | Age: 49
Discharge: HOME | End: 2024-10-25
Payer: MEDICARE

## 2024-10-25 DIAGNOSIS — Z12.31 ENCOUNTER FOR SCREENING MAMMOGRAM FOR MALIGNANT NEOPLASM OF BREAST: ICD-10-CM

## 2024-10-25 PROCEDURE — 77063 BREAST TOMOSYNTHESIS BI: CPT | Mod: TC

## 2024-10-25 PROCEDURE — 77067 SCR MAMMO BI INCL CAD: CPT | Mod: TC

## 2024-10-25 PROCEDURE — 77063 BREAST TOMOSYNTHESIS BI: CPT | Mod: 26,,, | Performed by: RADIOLOGY

## 2024-10-25 PROCEDURE — 77067 SCR MAMMO BI INCL CAD: CPT | Mod: 26,,, | Performed by: RADIOLOGY

## 2024-11-01 ENCOUNTER — CLINICAL SUPPORT (OUTPATIENT)
Dept: LAB | Facility: HOSPITAL | Age: 49
End: 2024-11-01
Payer: MEDICARE

## 2024-11-01 ENCOUNTER — OFFICE VISIT (OUTPATIENT)
Dept: FAMILY MEDICINE | Facility: HOSPITAL | Age: 49
End: 2024-11-01
Payer: MEDICARE

## 2024-11-01 VITALS
HEIGHT: 71 IN | OXYGEN SATURATION: 99 % | HEART RATE: 75 BPM | WEIGHT: 293 LBS | SYSTOLIC BLOOD PRESSURE: 112 MMHG | DIASTOLIC BLOOD PRESSURE: 70 MMHG | BODY MASS INDEX: 41.02 KG/M2

## 2024-11-01 DIAGNOSIS — I10 PRIMARY HYPERTENSION: ICD-10-CM

## 2024-11-01 DIAGNOSIS — E66.2 MORBID (SEVERE) OBESITY WITH ALVEOLAR HYPOVENTILATION: ICD-10-CM

## 2024-11-01 DIAGNOSIS — Z98.84 BARIATRIC SURGERY STATUS: Primary | ICD-10-CM

## 2024-11-01 DIAGNOSIS — R73.03 PREDIABETES: ICD-10-CM

## 2024-11-01 DIAGNOSIS — E11.9 TYPE 2 DIABETES MELLITUS WITHOUT COMPLICATION, WITHOUT LONG-TERM CURRENT USE OF INSULIN: ICD-10-CM

## 2024-11-01 DIAGNOSIS — F41.0 PANIC DISORDER: ICD-10-CM

## 2024-11-01 DIAGNOSIS — Z98.84 BARIATRIC SURGERY STATUS: ICD-10-CM

## 2024-11-01 PROCEDURE — 93005 ELECTROCARDIOGRAM TRACING: CPT

## 2024-11-01 PROCEDURE — 99213 OFFICE O/P EST LOW 20 MIN: CPT | Mod: 25

## 2024-11-01 PROCEDURE — 93010 ELECTROCARDIOGRAM REPORT: CPT | Mod: ,,, | Performed by: INTERNAL MEDICINE

## 2024-11-01 RX ORDER — ATORVASTATIN CALCIUM 40 MG/1
40 TABLET, FILM COATED ORAL DAILY
Qty: 90 TABLET | Refills: 3 | Status: SHIPPED | OUTPATIENT
Start: 2024-11-01 | End: 2025-11-01

## 2024-11-01 RX ORDER — TIRZEPATIDE 5 MG/.5ML
5 INJECTION, SOLUTION SUBCUTANEOUS
Qty: 2 ML | Refills: 2 | Status: CANCELLED | OUTPATIENT
Start: 2024-11-01 | End: 2025-01-30

## 2024-11-01 RX ORDER — FLUTICASONE PROPIONATE 50 MCG
1 SPRAY, SUSPENSION (ML) NASAL 2 TIMES DAILY PRN
Qty: 15 G | Refills: 0 | Status: SHIPPED | OUTPATIENT
Start: 2024-11-01 | End: 2024-11-01

## 2024-11-01 RX ORDER — LIDOCAINE 50 MG/G
1 PATCH TOPICAL DAILY
Qty: 5 PATCH | Refills: 0 | Status: SHIPPED | OUTPATIENT
Start: 2024-11-01 | End: 2024-11-01

## 2024-11-01 RX ORDER — ENALAPRIL MALEATE AND HYDROCHLOROTHIAZIDE 10; 25 MG/1; MG/1
1 TABLET ORAL DAILY
Qty: 90 TABLET | Refills: 3 | Status: SHIPPED | OUTPATIENT
Start: 2024-11-01 | End: 2025-11-01

## 2024-11-01 RX ORDER — METFORMIN HYDROCHLORIDE 500 MG/1
500 TABLET ORAL
Qty: 30 TABLET | Refills: 0 | Status: SHIPPED | OUTPATIENT
Start: 2024-11-01 | End: 2024-12-01

## 2024-11-01 RX ORDER — LIDOCAINE 50 MG/G
1 PATCH TOPICAL DAILY
Qty: 5 PATCH | Refills: 0 | Status: SHIPPED | OUTPATIENT
Start: 2024-11-01

## 2024-11-01 RX ORDER — PROPRANOLOL HYDROCHLORIDE 10 MG/1
10 TABLET ORAL 3 TIMES DAILY
Qty: 90 TABLET | Refills: 2 | Status: SHIPPED | OUTPATIENT
Start: 2024-11-01 | End: 2025-11-01

## 2024-11-01 RX ORDER — FLUTICASONE PROPIONATE 50 MCG
1 SPRAY, SUSPENSION (ML) NASAL 2 TIMES DAILY PRN
Qty: 15 G | Refills: 0 | Status: SHIPPED | OUTPATIENT
Start: 2024-11-01

## 2024-11-01 NOTE — PROGRESS NOTES
"    [facility] CLINIC NOTE  Pre-Op Visit      SUBJECTIVE:     Patient: Leticia Law is a 49 y.o. female.    Chief Compliant:   Chief Complaint   Patient presents with    Results    Follow-up       History of Present Illness:  Presents for pre-op evaluation for Bariatric surgery with Dr. Yulisa Bhatt that is yet to be scheduled.    Review of Systems   All other systems reviewed and are negative.    A 10+ review of systems was performed with pertinent positives and negatives noted above in the history of present illness. Other systems were negative unless otherwise specified.    OBJECTIVE:     Vital Signs (Most Recent)  Vitals:    11/01/24 0958   BP: 112/70   Pulse: 75   SpO2: 99%   Weight: (!) 177.8 kg (391 lb 15.7 oz)   Height: 5' 11" (1.803 m)     BMI: Body mass index is 54.67 kg/m².     Physical Exam:  Physical Exam  Constitutional:       General: She is not in acute distress.     Appearance: Normal appearance. She is obese. She is not ill-appearing, toxic-appearing or diaphoretic.   HENT:      Head: Normocephalic and atraumatic.      Right Ear: External ear normal.      Left Ear: External ear normal.      Nose: Nose normal.      Mouth/Throat:      Mouth: Mucous membranes are moist.      Pharynx: Oropharynx is clear.   Eyes:      Extraocular Movements: Extraocular movements intact.   Cardiovascular:      Rate and Rhythm: Normal rate and regular rhythm.      Pulses: Normal pulses.      Heart sounds: Normal heart sounds. No murmur heard.  Pulmonary:      Effort: Pulmonary effort is normal. No respiratory distress.      Breath sounds: Normal breath sounds. No stridor. No wheezing, rhonchi or rales.   Chest:      Chest wall: No tenderness.   Abdominal:      General: Abdomen is flat. Bowel sounds are normal. There is no distension.      Palpations: Abdomen is soft.      Tenderness: There is no abdominal tenderness.   Musculoskeletal:         General: No swelling or tenderness. Normal range of " motion.      Cervical back: Normal range of motion and neck supple. No tenderness.   Skin:     General: Skin is warm and dry.   Neurological:      Mental Status: She is alert and oriented to person, place, and time.          ASSESSMENT:   Leticia Law is a 49 y.o. female who presents to clinic to for    1. Bariatric surgery status    2. Prediabetes    3. Panic disorder    4. Primary hypertension    5. Type 2 diabetes mellitus without complication, without long-term current use of insulin    6. Morbid (severe) obesity with alveolar hypoventilation         PLAN:     Pre-operative evaluation with risk assessment              -           Scheduled for a Gastric Sleeve on TBD by Dr. Yulisa Bhatt .  -           DASI score: 42.7 w/ Functional Capacity in METS: 7.99 (>4) with a revised cardiac risk index of 0.    - Patient is not on antiplatelets/anticoagulation.   - Patient does not have a history of blood dyscrasias or previous reaction to anesthesia   - Patient  is a NEVER SMOKER.  - Patient does have a prior h/o HTN.  - Patient does have a prior h/o HLD/CAD w/ either MI or CVA. See below for latest ASCVD if all necessary values are available. Continue medical management w/ Lipitor 40 mg.  - Patient does have a prior h/o DM. Currently taking metformin and mounjaro. See below for last A1C.  - Patient does have a prior h/o thyroid disease. Currently taking 200 mcg levothyroxine. See below for last TSH.   - Patient does not know have a prior h/o COPD/Asthma/FRANCO/OHS. does not use CPAP. does have changes from baseline function.   - Patient does not have a prior h/o HF. Echo: done previously.  - BMI: Body mass index is 54.67 kg/m².              -           Patient is medically optimized with a low to moderate risk to proceed with (per ACC/AHA irma-operative guidelines) a moderate risk surgery.   - Please call our office for any additional questions or concerns.        #Request for in facility sleep study  -  Patient requesting to have sleep study done in the clinic/facility rather than doing a home sleep study test 2/2 insurance payment  - Will order polysomnogram to get this procedure done.    #Medication refills  - Refilled medications as below.    Bariatric surgery status  -     EKG 12-lead; Future  -     Polysomnogram (CPAP will be added if patient meets diagnostic criteria.); Future    Prediabetes  -     metFORMIN (GLUCOPHAGE) 500 MG tablet; Take 1 tablet (500 mg total) by mouth daily with breakfast.  Dispense: 30 tablet; Refill: 0  -     atorvastatin (LIPITOR) 40 MG tablet; Take 1 tablet (40 mg total) by mouth once daily.  Dispense: 90 tablet; Refill: 3    Panic disorder  -     propranoloL (INDERAL) 10 MG tablet; Take 1 tablet (10 mg total) by mouth 3 (three) times daily.  Dispense: 90 tablet; Refill: 2    Primary hypertension  -     enalapriL-hydrochlorothiazide (VASERETIC) 10-25 mg per tablet; Take 1 tablet by mouth once daily.  Dispense: 90 tablet; Refill: 3    Type 2 diabetes mellitus without complication, without long-term current use of insulin    Morbid (severe) obesity with alveolar hypoventilation  -     Polysomnogram (CPAP will be added if patient meets diagnostic criteria.); Future    Other orders  -     Discontinue: fluticasone propionate (FLONASE) 50 mcg/actuation nasal spray; 1 spray (50 mcg total) by Each Nostril route 2 (two) times daily as needed for Rhinitis.  Dispense: 15 g; Refill: 0  -     Discontinue: LIDOcaine (LIDODERM) 5 %; Place 1 patch onto the skin once daily. Remove & Discard patch within 12 hours or as directed by MD  Dispense: 5 patch; Refill: 0  -     tirzepatide 7.5 mg/0.5 mL PnIj; Inject 7.5 mg into the skin every 7 days.  Dispense: 2 mL; Refill: 0  -     LIDOcaine (LIDODERM) 5 %; Place 1 patch onto the skin once daily. Remove & Discard patch within 12 hours or as directed by MD  Dispense: 5 patch; Refill: 0  -     fluticasone propionate (FLONASE) 50 mcg/actuation nasal spray; 1  spray (50 mcg total) by Each Nostril route 2 (two) times daily as needed for Rhinitis.  Dispense: 15 g; Refill: 0        Provided patient with anticipatory guidance and return precautions. Treatment plan discussed with patient, all questions answered, and patient acknowledged understanding and verbal agreement.      Case discussed with Dr. BRAVO Jimenez    _________________________________________  Joanna Martin MD, PGY-1  [facility] Res[facility] - [place]

## 2024-11-02 LAB
OHS QRS DURATION: 84 MS
OHS QTC CALCULATION: 421 MS

## 2024-11-07 NOTE — PROGRESS NOTES
I assume primary medical responsibility for this patient. I have reviewed the history, physical, and assessement & treatment plan with the resident and agree that the care is reasonable and necessary. This service has been performed by a resident with the presence of a teaching physician for the key parts of the history/exam. If necessary, an addendum of additional findings or evaluation beyond the resident documentation will be noted below.     Mary Jimenez MD

## 2024-12-23 ENCOUNTER — HOSPITAL ENCOUNTER (EMERGENCY)
Facility: HOSPITAL | Age: 49
Discharge: HOME | End: 2024-12-23
Attending: EMERGENCY MEDICINE
Payer: MEDICARE

## 2024-12-23 VITALS
SYSTOLIC BLOOD PRESSURE: 140 MMHG | HEIGHT: 71 IN | HEART RATE: 71 BPM | RESPIRATION RATE: 20 BRPM | DIASTOLIC BLOOD PRESSURE: 68 MMHG | OXYGEN SATURATION: 98 % | BODY MASS INDEX: 54.67 KG/M2 | TEMPERATURE: 99 F

## 2024-12-23 DIAGNOSIS — S39.012A STRAIN OF LUMBAR REGION, INITIAL ENCOUNTER: Primary | ICD-10-CM

## 2024-12-23 LAB
B-HCG UR QL: NEGATIVE
BILIRUB UR QL STRIP: NEGATIVE
CLARITY UR REFRACT.AUTO: CLEAR
COLOR UR AUTO: YELLOW
CTP QC/QA: YES
GLUCOSE UR QL STRIP: NEGATIVE
HGB UR QL STRIP: NEGATIVE
KETONES UR QL STRIP: NEGATIVE
LEUKOCYTE ESTERASE UR QL STRIP: NEGATIVE
NITRITE UR QL STRIP: NEGATIVE
PH UR STRIP: 6 [PH] (ref 5–8)
PROT UR QL STRIP: NEGATIVE
SP GR UR STRIP: 1.02 (ref 1–1.03)
URN SPEC COLLECT METH UR: NORMAL
UROBILINOGEN UR STRIP-ACNC: 1 EU/DL

## 2024-12-23 PROCEDURE — 81025 URINE PREGNANCY TEST: CPT | Mod: ER | Performed by: NURSE PRACTITIONER

## 2024-12-23 PROCEDURE — 81003 URINALYSIS AUTO W/O SCOPE: CPT | Mod: ER | Performed by: NURSE PRACTITIONER

## 2024-12-23 PROCEDURE — 99284 EMERGENCY DEPT VISIT MOD MDM: CPT | Mod: ER

## 2024-12-23 RX ORDER — METHOCARBAMOL 500 MG/1
500 TABLET, FILM COATED ORAL 3 TIMES DAILY
Qty: 9 TABLET | Refills: 0 | Status: SHIPPED | OUTPATIENT
Start: 2024-12-23 | End: 2024-12-26

## 2024-12-23 RX ORDER — LIDOCAINE 50 MG/G
1 PATCH TOPICAL DAILY
Qty: 5 PATCH | Refills: 0 | Status: SHIPPED | OUTPATIENT
Start: 2024-12-23 | End: 2024-12-28

## 2024-12-23 RX ORDER — DICLOFENAC SODIUM 10 MG/G
2 GEL TOPICAL 4 TIMES DAILY
Qty: 20 G | Refills: 0 | Status: SHIPPED | OUTPATIENT
Start: 2024-12-23

## 2024-12-23 NOTE — DISCHARGE INSTRUCTIONS
Thank you for letting me care for you today - it was nice to meet you and I hope you feel better soon. Please follow up with your primary care provider. I have also placed a referral to Physical Medicine and Rehabilitation for follow up care. You can call [phone] ([phone]) to schedule. You can also schedule on the [facility]ana.  You can also schedule on the [employer] [facility]ana.     I have sent in prescriptions for the following:   Robaxin. This is a muscle relaxer. You can take this 3 times per day, but it can make you sleepy. So I recommend only taking this at night. Please do note drive or operate heavy machinery while taking.    Voltaren Gel: You can rub this on the area that is causing you pain 4 times per day. You can rub this on the area that is causing you pain 4 times per day.  If you find this is helpful, you can also buy them over the counter.    Lidoderm patches: You can apply this to the area that is causing pain and leave it on for up to 12 hours. If you find this is helpful, you can also buy these over the counter.      Over the counter:  Rotate between acetaminophen (Tylenol) and ibuprofen (Motrin), switching every 3 hours. For example, Tylenol at 8am, ibuprofen at 11am, tylenol at 2pm.  Do not take more than 3000 mg of Tylenol in 1 day or more than 2400 mg of ibuprofen and 1 day.     Our goal at [facility] is to always give you outstanding care and exceptional service. You may receive a survey by mail or email in the next week about your experience in our ED. We would greatly appreciate you completing and returning the survey. Your feedback provides us with a way to recognize our staff who give very good care and it helps us learn how to improve when your experience was below our aspiration of excellence.     All the best,     Perri Stovall, MPH, PA-C  Emergency Department Physician Assistant  [facility], [facility]

## 2024-12-23 NOTE — FIRST PROVIDER EVALUATION
Emergency Department TeleTriage Encounter Note      CHIEF COMPLAINT    Chief Complaint   Patient presents with    Back Pain     PT reports back pain that has been going on for a while after lifted a chair        VITAL SIGNS   Initial Vitals [12/23/24 1416]   BP Pulse Resp Temp SpO2   (!) 140/68 71 20 98.9 °F (37.2 °C) 98 %      MAP       --            ALLERGIES    Review of patient's allergies indicates:  No Known Allergies    PROVIDER TRIAGE NOTE  TeleTriage Note: Leticia Law, a nontoxic/well appearing, 49 y.o. female, presented to the ED with c/o right sided mid back pain that began in April. Denies urinary or bowel incontinence. Referred to PT for her back pain.     All ED beds are full at present; patient notified of this status.  Patient seen and medically screened by Nurse Practitioner via teletriage. Orders initiated at triage to expedite care.  Patient is stable to return to the waiting room and will be placed in an ED bed when available.  Care will be transferred to an alternate provider when patient has been placed in an Exam Room from the [facility] for physical exam, additional orders, and disposition.  2:22 PM Margarita Espinoza DNP, FNP-C        ORDERS  Labs Reviewed - No data to display    ED Orders (720h ago, onward)      Start Ordered     Status Ordering Provider    12/23/24 1424 12/23/24 1423  Urinalysis, Reflex to Urine Culture Urine, Clean Catch  STAT         Ordered MARGARITA ESPINOZA    12/23/24 1424 12/23/24 1423  POCT urine pregnancy  Once         Ordered MARGARITA ESPINOZA              Virtual Visit Note: The provider triage portion of this emergency department evaluation and documentation was performed via [employer], a HIPAA-compliant telemedicine application, in concert with a tele-presenter in the room. A face to face patient evaluation with one of my colleagues will occur once the patient is placed in an emergency department room.      DISCLAIMER: This note was prepared with M*Modal  voice recognition transcription software. Garbled syntax, mangled pronouns, and other bizarre constructions may be attributed to that software system.

## 2024-12-24 NOTE — ED PROVIDER NOTES
Encounter Date: 2024       History     Chief Complaint   Patient presents with    Back Pain     PT reports back pain that has been going on for a while after lifted a chair      Patient is a 49 y.o. female who presents for evaluation of mid and lower back pain.  States that she has had this intermittently for the past few months.  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  States that she has taken Excedrin for pain relief, which temporarily relieves pain. There is no numbness, weakness, incontinence, or retention reported. Denies hx of IVDU or malignancy. Denies fever, headache, chest pain, shortness of breath, wheezing, stridor, drooling, NVD, abdominal pain, constipation, urinary problems, joint problems, rashes, or any other complaints at this time.          Review of patient's allergies indicates:  No Known Allergies  Past Medical History:   Diagnosis Date    Depression     Diabetes mellitus     Hyperlipidemia     Hypertension     Thyroid disease     possible hypthyroid disease     Past Surgical History:   Procedure Laterality Date     SECTION      TUBAL LIGATION       Family History   Problem Relation Name Age of Onset    Breast cancer Maternal Cousin       Social History     Tobacco Use    Smoking status: Never    Smokeless tobacco: Never   Substance Use Topics    Alcohol use: Yes     Comment: OCC    Drug use: No     Review of Systems   Constitutional:  Negative for chills and fever.   Genitourinary: Negative.    Musculoskeletal:  Positive for back pain. Negative for gait problem.   Skin:  Negative for rash.       Physical Exam     Initial Vitals [24 1416]   BP Pulse Resp Temp SpO2   (!) 140/68 71 20 98.9 °F (37.2 °C) 98 %      MAP       --         Physical Exam    Constitutional: She appears well-developed and well-nourished.   HENT:   Head: Normocephalic and atraumatic.   Musculoskeletal:       Comments: bilateral-sided paraspinal tenderness in the thoracic and lumbar area. No midline tenderness, bony step-offs, deformities noted to C, T, L-spine.  Negative straight leg raise, bilaterally. Neurovascularly intact.  No focal weakness. BUE and BLE strength normal - normal strength with hip flexion, knee flexion and extension, plantarflexion and dorsiflexion, and dorsiflexion of the great toe. 2+ pedal pulse. No CVA tenderness.      Neurological: She is alert.         ED Course   Procedures  Labs Reviewed   URINALYSIS, REFLEX TO URINE CULTURE       Result Value    Specimen UA Urine, Clean Catch      Color, UA Yellow      Appearance, UA Clear      pH, UA 6.0      Specific Gravity, UA 1.020      Protein, UA Negative      Glucose, UA Negative      Ketones, UA Negative      Bilirubin (UA) Negative      Occult Blood UA Negative      Nitrite, UA Negative      Urobilinogen, UA 1.0      Leukocytes, UA Negative      Narrative:     Preferred Collection Type->Urine, Clean Catch  Specimen Source->Urine   POCT URINE PREGNANCY    POC Preg Test, Ur Negative       Acceptable Yes            Imaging Results    None          Medications - No data to display  Medical Decision Making  Patient is a afebrile, well appearing 49 y.o. female who presents for evaluation of mid and lower back pain. Patient is able to ambulate. Denies saddle anesthesia, loss of bowel or bladder control, or urinary retention. Pulses normal. BLE strength normal. Neurovascularly intact.     Differential Diagnosis includes, but is not limited to: Cauda equina syndrome, disc herniation, spinal stenosis, sciatica, radiculopathy, lumbar muscle strain     All historical, clinical findings were reviewed with patient. At present, the patient's mechanism of injury as well as the location leans heavily towards thoracic and lumbar strain. Clinical picture consistent with benign musculoskeletal back pain without any red flags to indicate need for  emergent imaging.  There are no findings worrisome for neurologic deficit or infection. No fevers/trauma/bowel/bladder dysfunction/leg weakness/hx of cancer or IVDA, exam w/o evidence to suggest cord compression, cauda equina or retroperitoneal process.     No further intervention is indicated at this time and I am of the belief that that it is safe to discharge the patient from the emergency department. Patient has been counseled regarding the need for follow-up as well as the indications to return to the emergency room should new or worrisome developments (including,but not limited to: worsening pain, saddle anesthesia, loss of bowel or bladder control, loss of strength or sensation) occur. Referral placed to PM&R. Additionally, patient instructed to follow up with PCP  in 2-3 days for recheck of today's complaints.    Discharge and follow-up instructions discussed with the patient who expressed understanding and willingness to comply with recommendations. Patient discharged from the emergency department in stable condition, in no acute distress.     Risk  Prescription drug management.                                      Clinical Impression:  Final diagnoses:  [S39.012A] Strain of lumbar region, initial encounter (Primary)          ED Disposition Condition    Discharge           ED Prescriptions       Medication Sig Dispense Start Date End Date Auth. Provider    methocarbamoL (ROBAXIN) 500 MG Tab Take 1 tablet (500 mg total) by mouth 3 (three) times daily. for 3 days 9 tablet 12/23/2024 12/26/2024 Perri Stovall PA-C    diclofenac sodium (VOLTAREN) 1 % Gel Apply 2 g topically 4 (four) times daily. 20 g 12/23/2024 -- Perri Stovall PA-C    LIDOcaine (LIDODERM) 5 % Place 1 patch onto the skin once daily. Remove & Discard patch within 12 hours or as directed by MD for 5 days 5 patch 12/23/2024 12/28/2024 Perri Stovall PA-C          Follow-up Information    None          Perri Stovall PA-C  12/23/24 9944

## 2024-12-31 ENCOUNTER — TELEPHONE (OUTPATIENT)
Dept: URGENT CARE | Facility: CLINIC | Age: 49
End: 2024-12-31
Payer: MEDICARE

## 2025-01-02 ENCOUNTER — OFFICE VISIT (OUTPATIENT)
Dept: URGENT CARE | Facility: CLINIC | Age: 50
End: 2025-01-02
Payer: COMMERCIAL

## 2025-01-02 VITALS
RESPIRATION RATE: 18 BRPM | BODY MASS INDEX: 41.02 KG/M2 | OXYGEN SATURATION: 99 % | HEIGHT: 71 IN | SYSTOLIC BLOOD PRESSURE: 137 MMHG | DIASTOLIC BLOOD PRESSURE: 64 MMHG | WEIGHT: 293 LBS | TEMPERATURE: 98 F | HEART RATE: 72 BPM
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
## 2025-01-02 DIAGNOSIS — Z02.6 ENCOUNTER RELATED TO WORKER'S COMPENSATION CLAIM: ICD-10-CM

## 2025-01-02 DIAGNOSIS — S39.012A BACK STRAIN, INITIAL ENCOUNTER: Primary | ICD-10-CM

## 2025-01-02 RX ORDER — NAPROXEN 500 MG/1
500 TABLET ORAL 2 TIMES DAILY WITH MEALS
Qty: 45 TABLET | Refills: 0 | Status: SHIPPED | OUTPATIENT
Start: 2025-01-02

## 2025-01-02 NOTE — LETTER
[facility] [address] [address]  Phone: [phone]  Fax: [phone]  [facility]: [phone]    Pt Name: Leticia Law  Injury Date: 03/28/2024   Employee ID:  Date of First Treatment: 01/02/2025   Company: [employer] reference to record [id number] [employer]      Appointment Time: 08:35 AM Arrived: 9:00 am    Provider: Milan Dial PA-C Time Out:9:45 am     Office Treatment:   1. Back strain, initial encounter    2. Encounter related to worker's compensation claim    3. Work related injury      Medications Ordered This Encounter   Medications    naproxen (NAPROSYN) 500 MG tablet      Patient Instructions: Attention not to aggravate affected area, Daily home exercises/warm soaks, PT to be scheduled once authorized    Restrictions: No lifting/pushing/pulling more than 25 lbs, Avoid frequent bending/lifting/twisting     Return Appointment: 1/23/2025 at 9:30 am Shilo

## 2025-01-02 NOTE — PROGRESS NOTES
Subjective:      Patient ID: Leticia Law is a 49 y.o. female.    Chief Complaint: Back Pain (DOI 03/28/2024 Back MANE)    Patient's place of employment - [employer] [facility] [place]   Patient's job title - House Keeping   Date of injury - 03/28/2024  Body part injured including left or right -  back   Injury Mechanism -  lifting   What they were doing when they got hurt - Pt was lifting the recliner chair causing injury to back. Pt was seen at an [facility] ED   What they did immediately after -  ER  Pain scale right now - 09/10  MANE    Provider note:  48 y/o AAF  presents with mid-low back pain since 4/2024 after lifting chairs while on duty. Pt initially went to the ED on 4/27/2024. She says the pain has not gotten better. She went again to the ED on 12/23/204 and then to an urgent care where she was given a steroid shot. Says she did not get any relief. Reports pain is on the right side of the mid-low back without radiation, radicular sxs, bowel or bladder incontinence. Denies previous low back injury. MEB    Back Pain  This is a chronic problem. The current episode started more than 1 month ago. The problem is unchanged. The pain does not radiate. The pain is at a severity of 9/10. Pertinent negatives include no bladder incontinence, bowel incontinence, numbness, perianal numbness or tingling.     Constitution: Positive for activity change.   HENT:  Negative for facial trauma.    Neck: Negative for neck pain.   Cardiovascular:  Negative for chest trauma.   Eyes:  Negative for eye trauma.   Respiratory:  Negative for shortness of breath.    Gastrointestinal:  Negative for bowel incontinence.   Genitourinary:  Negative for bladder incontinence.   Musculoskeletal:  Positive for pain and back pain.   Skin:  Negative for wound and bruising.   Neurological:  Negative for numbness and tingling.     Objective:     Physical Exam  Vitals and nursing note reviewed.   Constitutional:       General: She is not in acute distress.      Appearance: Normal appearance. She is well-developed.   HENT:      Head: Normocephalic and atraumatic.      Right Ear: Hearing and external ear normal.      Left Ear: Hearing and external ear normal.      Nose: Nose normal. No nasal deformity.   Eyes:      General: Lids are normal.      Conjunctiva/sclera: Conjunctivae normal.      Right eye: Right conjunctiva is not injected.      Left eye: Left conjunctiva is not injected.   Neck:      Trachea: Trachea normal.   Cardiovascular:      Pulses: Normal pulses.           Dorsalis pedis pulses are 2+ on the right side and 2+ on the left side.        Posterior tibial pulses are 2+ on the right side and 2+ on the left side.   Pulmonary:      Effort: Pulmonary effort is normal. No respiratory distress.      Breath sounds: No stridor.   Musculoskeletal:      Cervical back: Normal and normal range of motion. No spinous process tenderness or muscular tenderness.      Thoracic back: Normal.      Lumbar back: Tenderness present. No deformity. Decreased range of motion. Negative right straight leg raise test and negative left straight leg raise test.        Back:    Skin:     General: Skin is warm and dry.      Findings: No abrasion or bruising.   Neurological:      General: No focal deficit present.      Mental Status: She is alert.      GCS: GCS eye subscore is 4. GCS verbal subscore is 5. GCS motor subscore is 6.      Sensory: Sensation is intact. No sensory deficit.      Motor: Motor function is intact. No weakness (BLE strength 5/5).      Deep Tendon Reflexes: Reflexes are normal and symmetric.      Reflex Scores:       Patellar reflexes are 2+ on the right side and 2+ on the left side.       Achilles reflexes are 2+ on the right side and 2+ on the left side.  Psychiatric:         Attention and Perception: She is attentive.         Speech: Speech normal.         Behavior: Behavior normal.         Thought Content: Thought content normal.      Assessment:      1. Back strain,  initial encounter    2. Encounter related to worker's compensation claim    3. Work related injury      Plan:       Medications Ordered This Encounter   Medications    naproxen (NAPROSYN) 500 MG tablet     Sig: Take 1 tablet (500 mg total) by mouth 2 (two) times daily with meals.     Dispense:  45 tablet     Refill:  0     Patient Instructions: Attention not to aggravate affected area, Daily home exercises/warm soaks, PT to be scheduled once authorized   Restrictions: No lifting/pushing/pulling more than 25 lbs, Avoid frequent bending/lifting/twisting, Home today (May begin restricted duty next scheduled work day.)  Follow up in about 3 weeks (around 1/23/2025) for Reassessment.

## 2025-01-07 ENCOUNTER — LAB VISIT (OUTPATIENT)
Dept: LAB | Facility: HOSPITAL | Age: 50
End: 2025-01-07
Attending: OBSTETRICS & GYNECOLOGY
Payer: MEDICARE

## 2025-01-07 ENCOUNTER — OFFICE VISIT (OUTPATIENT)
Dept: OBSTETRICS AND GYNECOLOGY | Facility: CLINIC | Age: 50
End: 2025-01-07
Payer: MEDICARE

## 2025-01-07 VITALS — WEIGHT: 293 LBS | BODY MASS INDEX: 55.53 KG/M2 | SYSTOLIC BLOOD PRESSURE: 145 MMHG | DIASTOLIC BLOOD PRESSURE: 76 MMHG

## 2025-01-07 DIAGNOSIS — Z12.4 CERVICAL CANCER SCREENING: ICD-10-CM

## 2025-01-07 DIAGNOSIS — Z01.419 ROUTINE GYNECOLOGICAL EXAMINATION: ICD-10-CM

## 2025-01-07 DIAGNOSIS — Z01.419 ROUTINE GYNECOLOGICAL EXAMINATION: Primary | ICD-10-CM

## 2025-01-07 DIAGNOSIS — Z11.3 SCREENING EXAMINATION FOR STD (SEXUALLY TRANSMITTED DISEASE): ICD-10-CM

## 2025-01-07 DIAGNOSIS — Z11.4 ENCOUNTER FOR SCREENING FOR HIV: ICD-10-CM

## 2025-01-07 LAB — HIV 1+2 AB+HIV1 P24 AG SERPL QL IA: NORMAL

## 2025-01-07 PROCEDURE — 99999 PR PBB SHADOW E&M-EST. PATIENT-LVL II: CPT | Mod: PBBFAC,,, | Performed by: OBSTETRICS & GYNECOLOGY

## 2025-01-07 PROCEDURE — 87591 N.GONORRHOEAE DNA AMP PROB: CPT | Performed by: OBSTETRICS & GYNECOLOGY

## 2025-01-07 PROCEDURE — 36415 COLL VENOUS BLD VENIPUNCTURE: CPT | Performed by: OBSTETRICS & GYNECOLOGY

## 2025-01-07 PROCEDURE — 87389 HIV-1 AG W/HIV-1&-2 AB AG IA: CPT | Performed by: OBSTETRICS & GYNECOLOGY

## 2025-01-07 PROCEDURE — 87624 HPV HI-RISK TYP POOLED RSLT: CPT | Performed by: OBSTETRICS & GYNECOLOGY

## 2025-01-07 NOTE — PROGRESS NOTES
50 yo  perimenopausal female presenting for routine gyn visit.  Patient's last menstrual period was 10/10/2024.  Patient has no gyn complaints at this time.   Patient is s/p BTL.  Ok with std testimg today.  Patient desires Pap smear today.  Mammogram uptodate      ROS:  GENERAL: Denies weight gain or weight loss. Feeling well overall.   SKIN: Denies rash or lesions.   CHEST: Denies chest pain or shortness of breath.   CARDIOVASCULAR: Denies palpitations or left sided chest pain.   ABDOMEN: No abdominal pain, constipation, diarrhea, nausea, vomiting or rectal bleeding.   URINARY: No frequency, dysuria, hematuria, or burning on urination.  REPRODUCTIVE: no complaints  BREASTS: denies pain, lumps, or nipple discharge.   HEMATOLOGIC: No easy bruisability or excessive bleeding.   MUSCULOSKELETAL: Denies joint pain or swelling.   NEUROLOGIC: Denies syncope or weakness.   PSYCHIATRIC: Denies depression, anxiety or mood swings.         PE:   Vitals: BP (!) 145/76 (Patient Position: Sitting)   Wt (!) 180.6 kg (398 lb 2.4 oz)   LMP 10/10/2024   BMI 55.53 kg/m²   APPEARANCE: Well nourished, well developed, in no acute distress.  SKIN: Normal skin turgor, no lesions.  ABDOMEN: Soft. No tenderness or masses. No hepatosplenomegaly. No hernias. Obese.  BREASTS: Symmetrical, no skin changes or visible lesions. No palpable masses, nipple discharge or adenopathy bilaterally.  PELVIC: Normal external female genitalia without lesions. Normal hair distribution. Adequate perineal body, normal urethral meatus. Vagina moist and well rugated without lesions or discharge. Cervix pink and without lesions. No significant cystocele or rectocele. Bimanual exam showed uterus normal size, shape, position, mobile and nontender. Adnexa without masses or tenderness. Urethra and bladder normal.  EXTREMITIES: No clubbing cyanosis or edema.    AP  Routine gyn  -s/p normal breast exam: mammogram uptodate  -s/p normal pelvic exam:   -Pap and  HPV: collected  -STD testing: gc/chl and hIV ordered  -contraception: s/p BTL    F/u in 1 yr    DAVE Kwon MD

## 2025-01-08 ENCOUNTER — CLINICAL SUPPORT (OUTPATIENT)
Dept: REHABILITATION | Facility: HOSPITAL | Age: 50
End: 2025-01-08
Payer: COMMERCIAL

## 2025-01-08 DIAGNOSIS — G89.29 CHRONIC BILATERAL LOW BACK PAIN WITHOUT SCIATICA: Primary | ICD-10-CM

## 2025-01-08 DIAGNOSIS — M54.50 CHRONIC BILATERAL LOW BACK PAIN WITHOUT SCIATICA: Primary | ICD-10-CM

## 2025-01-08 LAB
C TRACH DNA SPEC QL NAA+PROBE: NOT DETECTED
N GONORRHOEA DNA SPEC QL NAA+PROBE: NOT DETECTED

## 2025-01-08 PROCEDURE — 97110 THERAPEUTIC EXERCISES: CPT | Mod: PN

## 2025-01-08 PROCEDURE — 97161 PT EVAL LOW COMPLEX 20 MIN: CPT | Mod: PN

## 2025-01-08 NOTE — PLAN OF CARE
[facility]   Physical Therapy Initial Evaluation      Name: Leticia Law  Clinic Number: [id number]    Therapy Diagnosis:   Encounter Diagnosis   Name Primary?    Chronic bilateral low back pain without sciatica Yes     Physician: Milan Dial PA-C    Physician Orders: PT Eval and Treat   Medical Diagnosis from Referral:   S39.012A (ICD-10-CM) - Back strain, initial encounter   Y99.0 (ICD-10-CM) - Work related injury     Evaluation Date: 1/8/2025  Authorization Period Expiration: 01/06/2026  Plan of Care Expiration: 1/8/2025 to 03/07/2025  Visit # / Visits authorized: 1/12 FOTO: 1/5 PTA visits: 0/5    Time In: 1300  Time Out: 1350  Total Appointment Time (timed & untimed codes): 45 minutes (1 LCE, 1 NM)  Precautions: Wcomp, DM, hypertensio    Subjective   Mrs. Law is a 48 y/o AAF. 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 Date of injury: March 2024. Stayed home for 3 days prior to returning back to work.  Working her normal work scheduled since the injury. New manager at her work initiated her through the Worker's Comp process. Saw Occupational Medicine 01/02/2025. No formal treatment prior to this date.     History of current condition: Denies radicular symptoms, lower extremity weakness, falls, or gait changes. Morning stiffness yes but < 30 minutes.     Prior medical treatment: Initially managed with pain cream     Occupation/job title:  ; Work schedule 7-3 PM --> 5 days/week, alternating shift.  Takes short 10-15 minute breaks at work which manages her symptoms. Does take one additional long break (~30 minutes) in the middle of her work day. Has been working this job for 5 years.    Job demands:   Bending/stooping, moving hospital room furniture, pushing/pulling    Current work status:  Light duty  Current work restrictions:  Light duty. Restrictions: No lifting/pushing/pulling more than 25 lbs, Avoid frequent bending/lifting/twisting, Home today (May  begin restricted duty next scheduled work day.)   Previous work status:  Full duty  Date last worked (if applicable): today.     Imaging:   None available  Social History:  Lives at home with family.      Pain:  Current 9/10, worst 10/10, best 7/10   Location: left lumbar pain   Description: Aching and Grabbing, Tight  Aggravating Factors:  Standing, walking,   Easing Factors:  rest, medications, pain cream.     Pt's goals: Return to employment according to previous level of function.     Medical History:   Past Medical History:   Diagnosis Date    Depression     Diabetes mellitus     Hyperlipidemia     Hypertension     Thyroid disease     possible hypthyroid disease     Surgical History:   Leticia Law  has a past surgical history that includes  section and Tubal ligation.    Medications:   Leticia OLIVA has a current medication list which includes the following prescription(s): atorvastatin, diclofenac sodium, enalapril-hydrochlorothiazide, fluticasone propionate, levothyroxine, lidocaine, metformin, naproxen, and propranolol.    Allergies:   Review of patient's allergies indicates:  No Known Allergies     Objective      Palpation:  Mild TTP lateral flank right.   Posture:   High body mass index. Tall female with long limbs and shorter trunk in relation. Stands in slight forward lean.     Gross movement analysis:  -Gait:   Non-antalgic pattern  -Forward bending:  Hip dominant pattern  -Squat:  Hip dominant pattern with minimal knee bend.     Lumbar Range of Motion:    Degrees   Flexion 50% (no pain with sitting, ++ pain with standing)   Extension 50%   Left Side Bending 50% (+ pulling pain right)   Right Side Bending 50% (+ sharp pain right)   Left rotation Limited (++ pain right flank)   Right Rotation   Limited (++ pain left lumbar)      Range of Motion:   LE Right Left   Hip flexion 100; soft end-feel 100; soft end-feel   Hip extension 10 10   Hip abduction WNL WNL   Hip ER 40 40   Hip IR  30 30   Knee  WNL WNL   Ankle WNL WNL     Lower Extremity Strength   Right Left   Hip flexion: 4/5 4/5   Hip extension: 4/5 4/5   Hip abduction: 4/5 4/5   Hip adduction 4/5 4/5   Hip ExR/IntR 3+/5 3+/5   Knee extension: 4/5 4/5   Knee flexion: 4/5 4/5   Ankle dorsiflexion: 5/5 5/5   Great toe extension: 5/5 5/5     Special Tests:  SLR neural tension test:  (-) bilateral   Lumbar quadrant test: (+) bilateral   Hip clearing test:   (-) bilateral     Joint Mobility: lumbar hypomobility throughout.   Sensation:  Intact light touch sensation to BLE through L2-S2 dermatomal distribution      Functional Job Specific Testing:   Job Specific Task Job Demands Current Ability   1. Pulling/pushing Frequently Occasionally   2. Lifting 50# Frequently Occasionally   3. Bending/stooping frequently Occasionally        FOTO: Limitation: 74%   Predicted limitation: 54%   CARINA: 67%    Treatment     Neuromuscular re-education activities to improve: Balance, Coordination, Kinesthetic, Sense, Proprioception, and Posture for 15'  LTRs 10x  Seated lumbar flexion peanut 2x10    Home Exercises and Patient Education Provided  Education provided:   - yes; above exercises    Home Exercises Provided: Patient instructed to cont prior HEP.  Exercises were reviewed and Leticia was able to demonstrate them prior to the end of the session.  Leticia demonstrated good  understanding of the education provided.     Assessment     Leticia OLIVA is a 49 y.o. referred to outpatient Physical Therapy with a medical diagnosis of S39.012A (ICD-10-CM) - Back strain, initial encounter, Y99.0 (ICD-10-CM) - Work related injury. Injured her back at work lifting/flipping over a recliner chair in March 2024. Did not report initial injury to employer.  Continued to work until present time. Right flank and lumbar area pain > left. Pain aggravated by lifting and forward trunk positions of work. Clinical examination today reveals no radicular symptoms, normal BLE strength and range of motion  throughout, core and lumbar weakness, pain with loaded lumbar flexion, fear avoidance with bending/stooping, and overall moderate lumbar tissue irritability.     Pt presents with decreased ROM, muscle strength, flexibility, joint mobility. Increased pain, stiffness, soft tissue restriction. Impaired posture, joint mechanics, balance, gait pattern.     The patient's current job specific task deficits include the following: bending, stooping, lifting, walking, pushing/pulling, reaching overhead, working overhead.     Patient prognosis: Excellent-to-Good.   Rehab potential: Excellent-to-Good    In my opinion, do the patient's subjective deficits match the objective findings (if no, please explain)?   - yes     Were there any discrepancies or inconsistencies between objective testing (if yes, please explain)?  - no     In my opinion, did the patient provide good effort throughout the examination (if no, please explain)?  - yes    Patient will benefit from skilled outpatient Physical Therapy to address the deficits stated above and in the chart below, provide patient /family education, to maximize patient's level of independence, and to address work-related functional deficits for their job as a .    Plan of care discussed with patient: Yes  Patient's spiritual, cultural and educational needs considered and patient is agreeable to the plan of care and goals as stated below:     Anticipated Barriers for therapy: transportation and chronicity of current injury    Medical Necessity is demonstrated by the following  History  Co-morbidities and personal factors that may impact the plan of care [] LOW: no personal factors / co-morbidities  [] MODERATE: 1-2 personal factors / co-morbidities  [x] HIGH: 3+ personal factors / co-morbidities    Moderate / High Support Documentation:   Co-morbidities affecting plan of care: Arthritis, Back pain, BMI over 30, Depression, Diabetes Type I or II, High Blood  Pressure    Personal Factors:   coping style     Examination  Body Structures and Functions, activity limitations and participation restrictions that may impact the plan of care [] LOW: addressing 1-2 elements  [x] MODERATE: 3+ elements  [] HIGH: 4+ elements (please support below)    Moderate / High Support Documentation: range of motion, strength, edema, flexibility, motor control     Clinical Presentation [x] LOW: stable  [] MODERATE: Evolving  [] HIGH: Unstable     Decision Making/ Complexity Score: low       Goals:   Short Terms Goals: 3 weeks    Goal  Progress  Date    (1)  Patient will be I with HOME EXERCISE PROGRAM  Initial, Not Met    (2)  Patient will demonstrate standing forward bending x 10 reps without pain  Initial, Not Met      (3)   Patient will demonstrate ability to push/pull 50# without lumbar pain  Initial, Not Met        Long Term Goals: 6-8 weeks    Goal  Progress  Date    (1)  Patient will demonstrate ability to lift 40# knees to chest without lumbar pain Initial, Not Met     (2)   Patient will report >10% improvement in CARINA score.   Initial, Not Met     (3)   Patient will report no acute exacerbations of lumbar pain with modified/light dut work activities.   Initial, Not Met        Plan     Plan of care Certification: 1/8/2025 to 03/07/2025.    Outpatient Physical Therapy 2-3 times weekly for 6 weeks to include the following interventions: Cervical/Lumbar Traction, Electrical Stimulation re-eval, dry needling, Gait Training, Iontophoresis (with ), Manual Therapy, Moist Heat/ Ice, Neuromuscular Re-ed, Patient Education, Self Care, Therapeutic Activities, and Therapeutic Exercise, functional dry needling, IASTM    Upon discharge from further skilled PT intervention it will determined if the need for a work conditioning program or Functional Capacity Evaluation is required to allow the injured worker to return to work with full potential achieved, continued improvement with body mechanics with  advanced functional activities, and further minimize future work-related injuries.     Physical therapist and physical therapy assistant(s) will met face to face to discuss patient's treatment plan and progress towards established goals. Pt will be seen by a physical therapist minimally every 6th visit or every 30 days.    Nathan Weston, PT, DPT, OCS  1/10/2025       I CERTIFY THE NEED FOR THESE SERVICES FURNISHED UNDER THIS PLAN OF TREATMENT AND WHILE UNDER MY CARE     Physician's comments:           Physician's Signature: ___________________________________________________

## 2025-01-09 PROBLEM — G89.29 CHRONIC BILATERAL LOW BACK PAIN WITHOUT SCIATICA: Status: ACTIVE | Noted: 2025-01-09

## 2025-01-09 PROBLEM — M54.50 CHRONIC BILATERAL LOW BACK PAIN WITHOUT SCIATICA: Status: ACTIVE | Noted: 2025-01-09

## 2025-01-13 ENCOUNTER — CLINICAL SUPPORT (OUTPATIENT)
Dept: REHABILITATION | Facility: HOSPITAL | Age: 50
End: 2025-01-13
Payer: COMMERCIAL

## 2025-01-13 DIAGNOSIS — M54.50 CHRONIC BILATERAL LOW BACK PAIN WITHOUT SCIATICA: Primary | ICD-10-CM

## 2025-01-13 DIAGNOSIS — G89.29 CHRONIC BILATERAL LOW BACK PAIN WITHOUT SCIATICA: Primary | ICD-10-CM

## 2025-01-13 DIAGNOSIS — R73.03 PREDIABETES: ICD-10-CM

## 2025-01-13 DIAGNOSIS — E03.9 HYPOTHYROIDISM, UNSPECIFIED TYPE: ICD-10-CM

## 2025-01-13 PROCEDURE — 97530 THERAPEUTIC ACTIVITIES: CPT | Mod: PN

## 2025-01-13 PROCEDURE — 97112 NEUROMUSCULAR REEDUCATION: CPT | Mod: PN

## 2025-01-13 RX ORDER — LEVOTHYROXINE SODIUM 200 UG/1
200 TABLET ORAL
Qty: 30 TABLET | Refills: 2 | Status: SHIPPED | OUTPATIENT
Start: 2025-01-13 | End: 2025-04-13

## 2025-01-13 RX ORDER — METFORMIN HYDROCHLORIDE 500 MG/1
500 TABLET ORAL
Qty: 30 TABLET | Refills: 0 | Status: SHIPPED | OUTPATIENT
Start: 2025-01-13 | End: 2025-02-12

## 2025-01-13 NOTE — PROGRESS NOTES
[facility]   Physical Therapy Treatment Note      Name: Leticia Law  [facility] Number: [id number]    Therapy Diagnosis:   Encounter Diagnosis   Name Primary?    Chronic bilateral low back pain without sciatica Yes     Physician: Milan Dial PA-C    Visit Date: 1/13/2025    Physician Orders: PT Eval and Treat   Medical Diagnosis from Referral:   S39.012A (ICD-10-CM) - Back strain, initial encounter   Y99.0 (ICD-10-CM) - Work related injury      Evaluation Date: 1/8/2025  Authorization Period Expiration: 01/06/2026  Plan of Care Expiration: 1/8/2025 to 03/07/2025  Visit # / Visits authorized: 2/12 FOTO: 2/5 PTA visits: 0/5  (# of No Show Appts 0/Number of Cancelled Appts 0)     Time In: 1300  Time Out: 1350  Total Appointment Time (timed & untimed codes): 50 minutes (2 TH, 2 NM)  Precautions: Wcomp, DM, hypertension    Occupation/job title:              at [employer] [place]  Job demands:                       Bending/stooping, moving hospital room furniture, pushing/pulling  Current work status:            Light duty  Current work restrictions:   Light duty. Restrictions: No lifting/pushing/pulling more than 25 lbs, Avoid frequent bending/lifting/twisting, Home today (May begin restricted duty next scheduled work day.)   Previous work status:          Full duty  Date last worked (if applicable): today.     Subjective     Pt reports: coming directly from work today.    She was compliant with home exercise program.  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 Pulling/pushing Frequently Occasionally   2. Lifting 50# Frequently Occasionally   3. Bending/stooping frequently Occasionally         FOTO: Limitation: 74%              Predicted  "limitation: 54%              CARINA: 67%    Treatment     Neuromuscular re-education activities to improve: Balance, Coordination, Kinesthetic, Sense, Proprioception, and Posture for 25 minutes:  BKFOs 2x10/each  LTRs 2x10  Seated lumbar flexion peanut 4x10  Peanut ball support hamstring stretch 2x10/5"/each  Hooklying TrAs 2x10/3"    Therapeutic activities to improve functional performance for 25  minutes:  SAPD greent tB 2x12  Seated row green tb 2x12  TrX mini-squat 2x15    Patient Education and Home Exercises       Education provided:   - none    Home Exercises Provided: Patient instructed to cont prior HEP. Exercises were reviewed and Leticia was able to demonstrate them prior to the end of the session.  Leticia demonstrated fair  understanding of the education provided. See EMR under Patient Instructions for exercises provided during therapy sessions    Assessment   Eval: Leticia OLIVA is a 49 y.o. referred to outpatient Physical Therapy with a medical diagnosis of S39.012A (ICD-10-CM) - Back strain, initial encounter, Y99.0 (ICD-10-CM) - Work related injury. Injured her back at work lifting/flipping over a recliner chair in March 2024. Did not report initial injury to employer.  Continued to work until present time. Right flank and lumbar area pain > left. Pain aggravated by lifting and forward trunk positions of work. Clinical examination today reveals no radicular symptoms, normal BLE strength and range of motion throughout, core and lumbar weakness, pain with loaded lumbar flexion, fear avoidance with bending/stooping, and overall moderate lumbar tissue irritability.      Interval: Comes to therapy after work. High subjective pain report. Initiated lumbar range of motion/flexibility program for pain modulation and lumbar stabilization program with good tolerance.   The patient's current job specific task deficits include the following: bending, stooping, lifting, walking, pushing/pulling, reaching overhead, " working overhead.     Leticia is making progress towards meeting her goals.     Patient prognosis is: Good.   Rehab potential is:     Pt will continue to benefit from skilled Physical Therapy interventions in order to address the deficits listed in the problem list box on initial evaluation, provide education, and to address the musculoskeletal limitations and work-related functional deficits for their job as a .  Pt's spiritual, cultural and educational needs considered and pt agreeable to plan of care and goals.     Anticipated barriers to physical therapy: transportation and chronicity of current injury     Goals:   Short Terms Goals: 3 weeks     Goal  Progress  Date    (1)  Patient will be I with HOME EXERCISE PROGRAM  Initial, Not Met     (2)  Patient will demonstrate standing forward bending x 10 reps without pain  Initial, Not Met      (3)   Patient will demonstrate ability to push/pull 50# without lumbar pain  Initial, Not Met         Long Term Goals: 6-8 weeks     Goal  Progress  Date    (1)  Patient will demonstrate ability to lift 40# knees to chest without lumbar pain Initial, Not Met      (2)   Patient will report >10% improvement in CARINA score.   Initial, Not Met     (3)   Patient will report no acute exacerbations of lumbar pain with modified/light dut work activities.   Initial, Not Met           Plan   Lumbar stabilization program  Squat progression for lifting tolerance improvement    Nathan Weston, PT, DPT, OCS  1/14/2025

## 2025-01-14 ENCOUNTER — TELEPHONE (OUTPATIENT)
Dept: ENDOSCOPY | Facility: HOSPITAL | Age: 50
End: 2025-01-14

## 2025-01-14 ENCOUNTER — CLINICAL SUPPORT (OUTPATIENT)
Dept: ENDOSCOPY | Facility: HOSPITAL | Age: 50
End: 2025-01-14
Payer: MEDICARE

## 2025-01-14 DIAGNOSIS — Z12.11 ENCOUNTER FOR SCREENING FOR MALIGNANT NEOPLASM OF COLON: ICD-10-CM

## 2025-01-14 NOTE — PLAN OF CARE
Contacted the patient x2 to schedule an endoscopy procedure(s) Colonoscopy. The patient did not answer the call and left a voice message requesting a call back.     
No

## 2025-01-14 NOTE — TELEPHONE ENCOUNTER
Contacted the patient x2 to schedule an endoscopy procedure(s) Colonoscopy. The patient did not answer the call and left a voice message requesting a call back.

## 2025-01-16 ENCOUNTER — CLINICAL SUPPORT (OUTPATIENT)
Dept: REHABILITATION | Facility: HOSPITAL | Age: 50
End: 2025-01-16
Payer: COMMERCIAL

## 2025-01-16 DIAGNOSIS — M54.50 CHRONIC BILATERAL LOW BACK PAIN WITHOUT SCIATICA: Primary | ICD-10-CM

## 2025-01-16 DIAGNOSIS — G89.29 CHRONIC BILATERAL LOW BACK PAIN WITHOUT SCIATICA: Primary | ICD-10-CM

## 2025-01-16 PROCEDURE — 97530 THERAPEUTIC ACTIVITIES: CPT | Mod: PN

## 2025-01-16 PROCEDURE — 97112 NEUROMUSCULAR REEDUCATION: CPT | Mod: PN

## 2025-01-16 NOTE — PROGRESS NOTES
[facility]   Physical Therapy Treatment Note      Name: Leticia Law  [facility] Number: [id number]    Therapy Diagnosis:   Encounter Diagnosis   Name Primary?    Chronic bilateral low back pain without sciatica Yes     Physician: Milan Dial PA-C    Visit Date: 1/16/2025    Physician Orders: PT Eval and Treat   Medical Diagnosis from Referral:   S39.012A (ICD-10-CM) - Back strain, initial encounter   Y99.0 (ICD-10-CM) - Work related injury      Evaluation Date: 1/8/2025  Authorization Period Expiration: 01/06/2026  Plan of Care Expiration: 1/8/2025 to 03/07/2025  Visit # / Visits authorized: 3/12 FOTO: 3/5 PTA visits: 0/5  (# of No Show Appts 0/Number of Cancelled Appts 0)     Time In: 1300  Time Out: 1350  Total Appointment Time (timed & untimed codes): 50 minutes (2 TH, 2 NM)  Precautions: Wcomp, DM, hypertension    Occupation/job title:              at [employer] [place] [employer]  Job demands:                       Bending/stooping, moving hospital room furniture, pushing/pulling  Current work status:            Light duty  Current work restrictions:   Light duty. Restrictions: No lifting/pushing/pulling more than 25 lbs, Avoid frequent bending/lifting/twisting, Home today (May begin restricted duty next scheduled work day.)   Previous work status:          Full duty  Date last worked (if applicable): today.     Subjective     Pt reports: coming directly from again work today.  Voices high level pain.   She was compliant with home exercise program.  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 Pulling/pushing Frequently Occasionally   2. Lifting 50# Frequently Occasionally   3. Bending/stooping frequently Occasionally         FOTO:  "Limitation: 74%              Predicted limitation: 54%              CARINA: 67%    Treatment     Neuromuscular re-education activities to improve: Balance, Coordination, Kinesthetic, Sense, Proprioception, and Posture for 25 minutes:  BKFOs 2x10/each  LTRs 2x10  Seated lumbar flexion peanut 4x10  Peanut ball support hamstring stretch 2x10/5"/each  Hooklying TrAs 2x10/3"    Therapeutic activities to improve functional performance for 30 minutes:  SAPD greent tB 2x12  standing row green tb 2x12  TrX mini-squat 2x15  STS 24" 3x1- 10#  Deadlift partial range 3x5 10#    Patient Education and Home Exercises       Education provided:   - none    Home Exercises Provided: Patient instructed to cont prior HEP. Exercises were reviewed and Leticia was able to demonstrate them prior to the end of the session.  Leticia demonstrated fair  understanding of the education provided. See EMR under Patient Instructions for exercises provided during therapy sessions    Assessment   Eval: Leticia OLIVA is a 49 y.o. referred to outpatient Physical Therapy with a medical diagnosis of S39.012A (ICD-10-CM) - Back strain, initial encounter, Y99.0 (ICD-10-CM) - Work related injury. Injured her back at work lifting/flipping over a recliner chair in March 2024. Did not report initial injury to employer.  Continued to work until present time. Right flank and lumbar area pain > left. Pain aggravated by lifting and forward trunk positions of work. Clinical examination today reveals no radicular symptoms, normal BLE strength and range of motion throughout, core and lumbar weakness, pain with loaded lumbar flexion, fear avoidance with bending/stooping, and overall moderate lumbar tissue irritability.      Interval: Comes to therapy after work. High subjective pain report again today but doesn't match objective performance. Continued lumbar range of motion/flexibility program f and lumbar stabilization program with good tolerance. Incorporated deadlifts and " upper range squatting today with good tolerance and appropriate fatigue.   The patient's current job specific task deficits include the following: bending, stooping, lifting, walking, pushing/pulling, reaching overhead, working overhead.     Leticia is making progress towards meeting her goals.     Patient prognosis is: Good.   Rehab potential is:     Pt will continue to benefit from skilled Physical Therapy interventions in order to address the deficits listed in the problem list box on initial evaluation, provide education, and to address the musculoskeletal limitations and work-related functional deficits for their job as a .  Pt's spiritual, cultural and educational needs considered and pt agreeable to plan of care and goals.     Anticipated barriers to physical therapy: transportation and chronicity of current injury     Goals:   Short Terms Goals: 3 weeks     Goal  Progress  Date    (1)  Patient will be I with HOME EXERCISE PROGRAM  Initial, Not Met     (2)  Patient will demonstrate standing forward bending x 10 reps without pain  Initial, Not Met      (3)   Patient will demonstrate ability to push/pull 50# without lumbar pain  Initial, Not Met         Long Term Goals: 6-8 weeks     Goal  Progress  Date    (1)  Patient will demonstrate ability to lift 40# knees to chest without lumbar pain Initial, Not Met      (2)   Patient will report >10% improvement in CARINA score.   Initial, Not Met     (3)   Patient will report no acute exacerbations of lumbar pain with modified/light dut work activities.   Initial, Not Met           Plan   Lumbar stabilization program  Squat progression for lifting tolerance improvement    Nathan Weston, PT, DPT, OCS  1/16/2025

## 2025-01-22 ENCOUNTER — TELEPHONE (OUTPATIENT)
Dept: URGENT CARE | Facility: CLINIC | Age: 50
End: 2025-01-22
Payer: MEDICARE

## 2025-01-22 NOTE — TELEPHONE ENCOUNTER
Left message and call back number regarding appt scheduled for 01/23. The clinic will be closed due to the weather.

## 2025-01-24 ENCOUNTER — CLINICAL SUPPORT (OUTPATIENT)
Dept: REHABILITATION | Facility: HOSPITAL | Age: 50
End: 2025-01-24
Payer: COMMERCIAL

## 2025-01-24 DIAGNOSIS — M54.50 CHRONIC BILATERAL LOW BACK PAIN WITHOUT SCIATICA: Primary | ICD-10-CM

## 2025-01-24 DIAGNOSIS — G89.29 CHRONIC BILATERAL LOW BACK PAIN WITHOUT SCIATICA: Primary | ICD-10-CM

## 2025-01-24 PROCEDURE — 97110 THERAPEUTIC EXERCISES: CPT | Mod: PN

## 2025-01-24 PROCEDURE — 97112 NEUROMUSCULAR REEDUCATION: CPT | Mod: PN

## 2025-01-24 NOTE — PROGRESS NOTES
[facility]   Physical Therapy Treatment Note      Name: Leticia Law  [facility] Number: [id number]    Therapy Diagnosis:   Encounter Diagnosis   Name Primary?    Chronic bilateral low back pain without sciatica Yes       Physician: Milan Dial PA-C    Visit Date: 1/24/2025    Physician Orders: PT Eval and Treat   Medical Diagnosis from Referral:   S39.012A (ICD-10-CM) - Back strain, initial encounter   Y99.0 (ICD-10-CM) - Work related injury      Evaluation Date: 1/8/2025  Authorization Period Expiration: 01/06/2026  Plan of Care Expiration: 1/8/2025 to 03/07/2025  Visit # / Visits authorized: 3/12 FOTO: 3/5 PTA visits: 0/5  (# of No Show Appts 0/Number of Cancelled Appts 0)     Time In: 1330  Time Out: 1430  Total Appointment Time (timed & untimed codes): 60 minutes (2 TH, 3 NM)  Precautions: Wcomp, DM, hypertension    Occupation/job title:              at [employer] [place]  Job demands:                       Bending/stooping, moving hospital room furniture, pushing/pulling  Current work status:            Light duty  Current work restrictions:   Light duty. Restrictions: No lifting/pushing/pulling more than 25 lbs, Avoid frequent bending/lifting/twisting, Home today (May begin restricted duty next scheduled work day.)   Previous work status:          Full duty  Date last worked (if applicable): today.     Subjective     Pt reports: coming directly from again work today.  Voices high level pain.   She was compliant with home exercise program.  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 Pulling/pushing Frequently Occasionally   2. Lifting 50# Frequently Occasionally   3. Bending/stooping frequently Occasionally         FOTO:  "Limitation: 74%              Predicted limitation: 54%              CARINA: 67%    Treatment     Neuromuscular re-education activities to improve: Balance, Coordination, Kinesthetic, Sense, Proprioception, and Posture for 15 minutes:    LTRs 2x10  Seated lumbar flexion peanut 4x10  Peanut ball support hamstring stretch 2x10/5"/each  Not today:  Hooklying TrAs 2x10/3"  BKFOs 2x10/each yellow band  Therapeutic activities to improve functional performance for 45 minutes:  SAPD greent tB 2x15  standing row green tb 2x15  TrX mini-squat 2x15  STS 19" x10 reps  Deadlift partial range x10, 15#; 10 reps, 10#    Patient Education and Home Exercises       Education provided:   - none    Home Exercises Provided: Patient instructed to cont prior HEP. Exercises were reviewed and Leticia was able to demonstrate them prior to the end of the session.  Leticia demonstrated fair  understanding of the education provided. See EMR under Patient Instructions for exercises provided during therapy sessions    Assessment   Eval: Leticia OLIVA is a 49 y.o. referred to outpatient Physical Therapy with a medical diagnosis of S39.012A (ICD-10-CM) - Back strain, initial encounter, Y99.0 (ICD-10-CM) - Work related injury. Injured her back at work lifting/flipping over a recliner chair in March 2024. Did not report initial injury to employer.  Continued to work until present time. Right flank and lumbar area pain > left. Pain aggravated by lifting and forward trunk positions of work. Clinical examination today reveals no radicular symptoms, normal BLE strength and range of motion throughout, core and lumbar weakness, pain with loaded lumbar flexion, fear avoidance with bending/stooping, and overall moderate lumbar tissue irritability.      Interval: Patient reports continued high pain. Reports that she feels the amount of bending she performs at work is hindering progress. Good tolerance of today's session despite reports of pain in the low back, " specifically when performing lifting activities. Continued lumbar range of motion/flexibility program and lumbar stabilization program with good tolerance. Incorporated deadlifts and upper range squatting today with good tolerance and appropriate fatigue.   The patient's current job specific task deficits include the following: bending, stooping, lifting, walking, pushing/pulling, reaching overhead, working overhead.     Leticia is making progress towards meeting her goals.     Patient prognosis is: Good.   Rehab potential is:     Pt will continue to benefit from skilled Physical Therapy interventions in order to address the deficits listed in the problem list box on initial evaluation, provide education, and to address the musculoskeletal limitations and work-related functional deficits for their job as a .  Pt's spiritual, cultural and educational needs considered and pt agreeable to plan of care and goals.     Anticipated barriers to physical therapy: transportation and chronicity of current injury     Goals:   Short Terms Goals: 3 weeks     Goal  Progress  Date    (1)  Patient will be I with HOME EXERCISE PROGRAM  Initial, Not Met     (2)  Patient will demonstrate standing forward bending x 10 reps without pain  Initial, Not Met      (3)   Patient will demonstrate ability to push/pull 50# without lumbar pain  Initial, Not Met         Long Term Goals: 6-8 weeks     Goal  Progress  Date    (1)  Patient will demonstrate ability to lift 40# knees to chest without lumbar pain Initial, Not Met      (2)   Patient will report >10% improvement in CARINA score.   Initial, Not Met     (3)   Patient will report no acute exacerbations of lumbar pain with modified/light dut work activities.   Initial, Not Met           Plan   Lumbar stabilization program  Squat progression for lifting tolerance improvement    Rosemary Fan, PT, DPT  1/24/2025

## 2025-01-30 ENCOUNTER — CLINICAL SUPPORT (OUTPATIENT)
Dept: REHABILITATION | Facility: HOSPITAL | Age: 50
End: 2025-01-30
Payer: COMMERCIAL

## 2025-01-30 ENCOUNTER — OFFICE VISIT (OUTPATIENT)
Dept: URGENT CARE | Facility: CLINIC | Age: 50
End: 2025-01-30
Payer: COMMERCIAL

## 2025-01-30 VITALS
TEMPERATURE: 98 F | HEIGHT: 71 IN | BODY MASS INDEX: 41.02 KG/M2 | HEART RATE: 67 BPM | SYSTOLIC BLOOD PRESSURE: 127 MMHG | RESPIRATION RATE: 20 BRPM | DIASTOLIC BLOOD PRESSURE: 63 MMHG | OXYGEN SATURATION: 100 % | WEIGHT: 293 LBS

## 2025-01-30 DIAGNOSIS — M54.50 CHRONIC BILATERAL LOW BACK PAIN WITHOUT SCIATICA: Primary | ICD-10-CM

## 2025-01-30 DIAGNOSIS — G89.29 CHRONIC BILATERAL LOW BACK PAIN WITHOUT SCIATICA: Primary | ICD-10-CM

## 2025-01-30 DIAGNOSIS — Z02.6 ENCOUNTER RELATED TO WORKER'S COMPENSATION CLAIM: ICD-10-CM

## 2025-01-30 DIAGNOSIS — S39.012A ACUTE MYOFASCIAL STRAIN OF LUMBAR REGION, INITIAL ENCOUNTER: Primary | ICD-10-CM

## 2025-01-30 PROCEDURE — 97112 NEUROMUSCULAR REEDUCATION: CPT | Mod: PN

## 2025-01-30 PROCEDURE — 99213 OFFICE O/P EST LOW 20 MIN: CPT | Mod: S$GLB,,, | Performed by: PHYSICIAN ASSISTANT

## 2025-01-30 PROCEDURE — 97530 THERAPEUTIC ACTIVITIES: CPT | Mod: PN

## 2025-01-30 RX ORDER — METHOCARBAMOL 500 MG/1
1000 TABLET, FILM COATED ORAL NIGHTLY
Qty: 30 TABLET | Refills: 0 | Status: SHIPPED | OUTPATIENT
Start: 2025-01-30 | End: 2025-01-30 | Stop reason: SDUPTHER

## 2025-01-30 RX ORDER — METHOCARBAMOL 500 MG/1
1000 TABLET, FILM COATED ORAL NIGHTLY PRN
Qty: 30 TABLET | Refills: 0 | Status: SHIPPED | OUTPATIENT
Start: 2025-01-30 | End: 2025-02-14

## 2025-01-30 NOTE — PROGRESS NOTES
[facility]   Physical Therapy Treatment Note      Name: Leticia Law  [facility] Number: [id number]    Therapy Diagnosis:   Encounter Diagnosis   Name Primary?    Chronic bilateral low back pain without sciatica Yes       Physician: Milan Dial PA-C    Visit Date: 1/30/2025    Physician Orders: PT Eval and Treat   Medical Diagnosis from Referral:   S39.012A (ICD-10-CM) - Back strain, initial encounter   Y99.0 (ICD-10-CM) - Work related injury      Evaluation Date: 1/8/2025  Authorization Period Expiration: 01/06/2026  Plan of Care Expiration: 1/8/2025 to 03/07/2025  Visit # / Visits authorized: 4/12 FOTO: 4/5 PTA visits: 0/5  (# of No Show Appts 0/Number of Cancelled Appts 0)     Time In: 1300  Time Out: 1355  Total Appointment Time (timed & untimed codes): 50 minutes (2 TH, 2 NM)  Precautions: Wcomp, DM, hypertension    Occupation/job title:              at [employer]  Job demands:                       Bending/stooping, moving hospital room furniture, pushing/pulling  Current work status:            Light duty  Current work restrictions:   Light duty. Restrictions: No lifting/pushing/pulling more than 25 lbs, Avoid frequent bending/lifting/twisting, Home today (May begin restricted duty next scheduled work day.)   Previous work status:          Full duty  Date last worked (if applicable): today.     Subjective     Pt reports: off work today. Back pain minimal upon arrival.   She was compliant with home exercise program.  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 Pulling/pushing Frequently Occasionally   2. Lifting 50# Frequently Occasionally   3. Bending/stooping frequently Occasionally         FOTO: Limitation: 74%       "        Predicted limitation: 54%              CARINA: 67%    Treatment     Neuromuscular re-education activities to improve: Balance, Coordination, Kinesthetic, Sense, Proprioception, and Posture for 25 minutes:  LTRs 2x10  Seated lumbar flexion peanut 4x10  SAPD green tB 2x15 phase I-II    Therapeutic activities to improve functional performance for 25 minutes:  Bridges 3x10  Quarter mini-squat 3x15  STS 19" 3x10  Deadlift partial range x10, 15#; 10 reps, 10#    Patient Education and Home Exercises       Education provided:   - none    Home Exercises Provided: Patient instructed to cont prior HEP. Exercises were reviewed and Leticia was able to demonstrate them prior to the end of the session.  Leticia demonstrated fair  understanding of the education provided. See EMR under Patient Instructions for exercises provided during therapy sessions    Assessment   Eval: Leticia OLIVA is a 49 y.o. referred to outpatient Physical Therapy with a medical diagnosis of S39.012A (ICD-10-CM) - Back strain, initial encounter, Y99.0 (ICD-10-CM) - Work related injury. Injured her back at work lifting/flipping over a recliner chair in March 2024. Did not report initial injury to employer.  Continued to work until present time. Right flank and lumbar area pain > left. Pain aggravated by lifting and forward trunk positions of work. Clinical examination today reveals no radicular symptoms, normal BLE strength and range of motion throughout, core and lumbar weakness, pain with loaded lumbar flexion, fear avoidance with bending/stooping, and overall moderate lumbar tissue irritability.      Interval:  Continued lumbar range of motion/flexibility program and lumbar stabilization program with good tolerance. Incorporating deadlifts and upper range squatting with good tolerance and appropriate fatigue.  Patient did not work today; overall low tissue irritability during session.     The patient's current job specific task deficits include the " following: bending, stooping, lifting, walking, pushing/pulling, reaching overhead, working overhead.     Leticia is making progress towards meeting her goals.     Patient prognosis is: Good.   Rehab potential is:     Pt will continue to benefit from skilled Physical Therapy interventions in order to address the deficits listed in the problem list box on initial evaluation, provide education, and to address the musculoskeletal limitations and work-related functional deficits for their job as a .  Pt's spiritual, cultural and educational needs considered and pt agreeable to plan of care and goals.     Anticipated barriers to physical therapy: transportation and chronicity of current injury     Goals:   Short Terms Goals: 3 weeks     Goal  Progress  Date    (1)  Patient will be I with HOME EXERCISE PROGRAM  Initial, Not Met     (2)  Patient will demonstrate standing forward bending x 10 reps without pain  Initial, Not Met      (3)   Patient will demonstrate ability to push/pull 50# without lumbar pain  Initial, Not Met         Long Term Goals: 6-8 weeks     Goal  Progress  Date    (1)  Patient will demonstrate ability to lift 40# knees to chest without lumbar pain Initial, Not Met      (2)   Patient will report >10% improvement in CARINA score.   Initial, Not Met     (3)   Patient will report no acute exacerbations of lumbar pain with modified/light dut work activities.   Initial, Not Met           Plan   Lumbar stabilization program  Squat progression for lifting tolerance improvement    Nathan Weston, PT, DPT, OCS  1/30/2025

## 2025-01-30 NOTE — LETTER
[facility] [address] [address]  Phone: [phone]  Fax: [phone]  [facility]: [phone]    Pt Name: Leticia Law  Injury Date: 03/28/2024   Employee ID: [id number] Date of Treatment: 01/30/2025   Company: [employer]      Appointment Time: 09:45 AM Arrived: 10:17 AM   Provider: Sandra Branch PA-C Time Out: 11:34 AM     Office Treatment:   1. Acute myofascial strain of lumbar region, initial encounter    2. Encounter related to worker's compensation claim      Medications Ordered This Encounter   Medications    methocarbamoL (ROBAXIN) 500 MG Tab      Patient Instructions: Attention not to aggravate affected area, Continue Physical Therapy, Daily home exercises/warm soaks      Restrictions: No lifting/pushing/pulling more than 25 lbs, Avoid frequent bending/lifting/twisting     Return Appointment: 2/21/2025 at 10:00 AM    MEKA

## 2025-01-30 NOTE — PROGRESS NOTES
Subjective:      Patient ID: Leticia Law is a 49 y.o. female.    Chief Complaint: Back Pain (Back and lower rt side)    Ms. Law presents for follow up of back injury, DOI 3/28/24.  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  She reports continue LBP that radiates to her right side and is worse with bending and twisting.  She denies any paresthesias, weakness, saddle anesthesia or B/B dysfunction.  She has been in PT and has a lot of pain after every session.  She reports she is still frequently bending under tables and chairs and twisting frequently, which is aggravating her back.  She has been taking the naproxen without relief.  She reports she just had an EGD, which showed a duodenal ulcer.      This patient is new to me.  I have reviewed all previous notes & imaging in the chart that pertain to this injury.       See MA note below.  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     Constitution: Negative for activity change, appetite change and fever.   HENT:  Negative for ear pain, ear discharge, mouth sores and congestion.    Neck: Negative for painful lymph nodes.   Eyes:  Negative for eye discharge and eye redness.   Respiratory:  Negative for cough.    Gastrointestinal:  Negative for vomiting and diarrhea.   Musculoskeletal:  Positive for pain and back pain.   Skin:  Negative for rash and hives.   Allergic/Immunologic: Negative for hives and sneezing.   Neurological:  Negative for loss of consciousness and seizures.   Hematologic/Lymphatic: Negative for swollen lymph nodes.     Objective:     Physical Exam  Vitals and nursing note reviewed.   Constitutional:       General: She is not in acute distress.     Appearance: Normal appearance. She is morbidly  obese. She is not ill-appearing.   HENT:      Head: Normocephalic and atraumatic.      Right Ear: Tympanic membrane, ear canal and external ear normal. There is no impacted cerumen.      Left Ear: Tympanic membrane, ear canal and external ear normal. There is no impacted cerumen.      Nose: Nose normal. No congestion or rhinorrhea.      Mouth/Throat:      Mouth: Mucous membranes are moist.      Pharynx: No oropharyngeal exudate or posterior oropharyngeal erythema.   Eyes:      Extraocular Movements: Extraocular movements intact.      Conjunctiva/sclera: Conjunctivae normal.      Pupils: Pupils are equal, round, and reactive to light.   Cardiovascular:      Rate and Rhythm: Normal rate and regular rhythm.      Pulses: Normal pulses.      Heart sounds: Normal heart sounds.   Pulmonary:      Effort: Pulmonary effort is normal.      Breath sounds: Normal breath sounds.   Abdominal:      General: Bowel sounds are normal.      Palpations: Abdomen is soft.   Musculoskeletal:      Cervical back: Normal range of motion.      Lumbar back: Tenderness present. No bony tenderness. Decreased range of motion. Negative right straight leg raise test and negative left straight leg raise test.        Back:       Comments: No step-offs appreciated.  No TTP lumbar spine.  TTP bilateral paraspinal musculature.  BLE 5/5 HF, KF, KE, DF, PF, EHL.  Sensation intact.   BLE DTR 2+ & symmetric.  Dec ROM with flexion, extension. Pain with flexion & extension. Normal side bending bilaterally, but pain to the left.  SLR neg bilaterally.     Skin:     General: Skin is warm.      Capillary Refill: Capillary refill takes less than 2 seconds.   Neurological:      General: No focal deficit present.      Mental Status: She is alert and oriented to person, place, and time.   Psychiatric:         Mood and Affect: Mood normal.         Behavior: Behavior normal.         Thought Content: Thought content normal.         Judgment: Judgment normal.       Assessment:      1. Acute myofascial strain of lumbar region, initial encounter    2. Encounter related to worker's compensation claim      Plan:     Advised to stop taking naproxen, as she has a known gastric ulcer.  Rx for robaxin given for nighttime PRN, use extra strength tylenol during the day.  I discussed precautions of no driving while taking this medication & do not take within 8 hours of the work shift, as the medication may cause drowsiness.  I have asked her to meet with her supervisor and again go over her restrictions of no frequent bending/twisting/lifting.  She is to be on light duty.  If things do not improve, she will contact me & I contact her employer to discuss her work restrictions.  Follow up in 3 weeks.     Medications Ordered This Encounter   Medications    methocarbamoL (ROBAXIN) 500 MG Tab     Sig: Take 2 tablets (1,000 mg total) by mouth nightly as needed (spasm).     Dispense:  30 tablet     Refill:  0     Patient Instructions: Attention not to aggravate affected area, Continue Physical Therapy, Daily home exercises/warm soaks   Restrictions: No lifting/pushing/pulling more than 25 lbs, Avoid frequent bending/lifting/twisting  Follow up in about 22 days (around 2/21/2025).

## 2025-02-03 ENCOUNTER — CLINICAL SUPPORT (OUTPATIENT)
Dept: REHABILITATION | Facility: HOSPITAL | Age: 50
End: 2025-02-03
Payer: COMMERCIAL

## 2025-02-03 DIAGNOSIS — M54.50 CHRONIC BILATERAL LOW BACK PAIN WITHOUT SCIATICA: Primary | ICD-10-CM

## 2025-02-03 DIAGNOSIS — G89.29 CHRONIC BILATERAL LOW BACK PAIN WITHOUT SCIATICA: Primary | ICD-10-CM

## 2025-02-03 PROCEDURE — 97112 NEUROMUSCULAR REEDUCATION: CPT | Mod: PN

## 2025-02-03 PROCEDURE — 97530 THERAPEUTIC ACTIVITIES: CPT | Mod: PN

## 2025-02-03 NOTE — PROGRESS NOTES
[facility]   Physical Therapy Treatment Note      Name: Leticia Law  [facility] Number: [id number]    Therapy Diagnosis:   Encounter Diagnosis   Name Primary?    Chronic bilateral low back pain without sciatica Yes       Physician: Milan Dial PA-C    Visit Date: 2/3/2025    Physician Orders: PT Eval and Treat   Medical Diagnosis from Referral:   S39.012A (ICD-10-CM) - Back strain, initial encounter   Y99.0 (ICD-10-CM) - Work related injury      Evaluation Date: 1/8/2025  Authorization Period Expiration: 01/06/2026  Plan of Care Expiration: 1/8/2025 to 03/07/2025  Visit # / Visits authorized: 5/12 FOTO: 5/5 PTA visits: 0/5  (# of No Show Appts 1/Number of Cancelled Appts 0)     Time In: 1300  Time Out: 1355  Total Appointment Time (timed & untimed codes): 50 minutes (2 TH, 2 NM)  Precautions: Wcomp, DM, hypertension    Occupation/job title:              at [employer] [place]  Job demands:                       Bending/stooping, moving hospital room furniture, pushing/pulling  Current work status:            Light duty  Current work restrictions:   Light duty. Restrictions: No lifting/pushing/pulling more than 25 lbs, Avoid frequent bending/lifting/twisting, Home today (May begin restricted duty next scheduled work day.)   Previous work status:          Full duty  Date last worked (if applicable): today.     Subjective     Pt reports: continued low back pain. Worse with work activities.   She was compliant with home exercise program.  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 Pulling/pushing Frequently Occasionally   2. Lifting 50# Frequently Occasionally   3. Bending/stooping frequently Occasionally         FOTO: Limitation:  "74%              Predicted limitation: 54%              CARINA: 67%    Treatment     Neuromuscular re-education activities to improve: Balance, Coordination, Kinesthetic, Sense, Proprioception, and Posture for 25 minutes:  LTRs 2x10  Seated lumbar flexion peanut 4x10  SAPD green tB 2x15 phase I-III  Seated bent-over rows 3x10 4#  Seated bicep curls 3x104#     Therapeutic activities to improve functional performance for 25 minutes:  Bridges 3x10  Quarter mini-squat 3x15  STS 19" 3x10  Pallof press red Tb 2x10/each    Patient Education and Home Exercises       Education provided:   - none    Home Exercises Provided: Patient instructed to cont prior HEP. Exercises were reviewed and Leticia was able to demonstrate them prior to the end of the session.  Leticia demonstrated fair  understanding of the education provided. See EMR under Patient Instructions for exercises provided during therapy sessions    Assessment   Eval: Leticia OLIVA is a 49 y.o. referred to outpatient Physical Therapy with a medical diagnosis of S39.012A (ICD-10-CM) - Back strain, initial encounter, Y99.0 (ICD-10-CM) - Work related injury. Injured her back at work lifting/flipping over a recliner chair in March 2024. Did not report initial injury to employer.  Continued to work until present time. Right flank and lumbar area pain > left. Pain aggravated by lifting and forward trunk positions of work. Clinical examination today reveals no radicular symptoms, normal BLE strength and range of motion throughout, core and lumbar weakness, pain with loaded lumbar flexion, fear avoidance with bending/stooping, and overall moderate lumbar tissue irritability.      Interval:  Continued lumbar range of motion/flexibility program and lumbar stabilization program with good tolerance. Continued upper range squatting with good tolerance and appropriate fatigue. Noted weak upper extremity muscles. Overall low tissue irritability during session.     The patient's current " job specific task deficits include the following: bending, stooping, lifting, walking, pushing/pulling, reaching overhead, working overhead.     Leticia is making progress towards meeting her goals.     Patient prognosis is: Good.   Rehab potential is:     Pt will continue to benefit from skilled Physical Therapy interventions in order to address the deficits listed in the problem list box on initial evaluation, provide education, and to address the musculoskeletal limitations and work-related functional deficits for their job as a .  Pt's spiritual, cultural and educational needs considered and pt agreeable to plan of care and goals.     Anticipated barriers to physical therapy: transportation and chronicity of current injury     Goals:   Short Terms Goals: 3 weeks     Goal  Progress  Date    (1)  Patient will be I with HOME EXERCISE PROGRAM  Initial, Not Met     (2)  Patient will demonstrate standing forward bending x 10 reps without pain  Initial, Not Met      (3)   Patient will demonstrate ability to push/pull 50# without lumbar pain  Initial, Not Met         Long Term Goals: 6-8 weeks     Goal  Progress  Date    (1)  Patient will demonstrate ability to lift 40# knees to chest without lumbar pain Initial, Not Met      (2)   Patient will report >10% improvement in CARINA score.   Initial, Not Met     (3)   Patient will report no acute exacerbations of lumbar pain with modified/light dut work activities.   Initial, Not Met           Plan   Lumbar stabilization program  Squat progression for lifting tolerance improvement    Nathan Weston, PT, DPT, OCS  2/3/2025

## 2025-02-06 ENCOUNTER — CLINICAL SUPPORT (OUTPATIENT)
Dept: REHABILITATION | Facility: HOSPITAL | Age: 50
End: 2025-02-06
Payer: COMMERCIAL

## 2025-02-06 ENCOUNTER — TELEPHONE (OUTPATIENT)
Dept: URGENT CARE | Facility: CLINIC | Age: 50
End: 2025-02-06
Payer: MEDICARE

## 2025-02-06 DIAGNOSIS — G89.29 CHRONIC BILATERAL LOW BACK PAIN WITHOUT SCIATICA: Primary | ICD-10-CM

## 2025-02-06 DIAGNOSIS — M54.50 CHRONIC BILATERAL LOW BACK PAIN WITHOUT SCIATICA: Primary | ICD-10-CM

## 2025-02-06 PROCEDURE — 97530 THERAPEUTIC ACTIVITIES: CPT | Mod: PN

## 2025-02-06 PROCEDURE — 97112 NEUROMUSCULAR REEDUCATION: CPT | Mod: PN

## 2025-02-06 NOTE — TELEPHONE ENCOUNTER
I received a message from [facility] [place] that [place] had called requesting a call back regarding her work restrictions.  I called the patient back at the number listed.  There was no answer and her VM box is not accepting VM at this time.

## 2025-02-06 NOTE — PROGRESS NOTES
[facility]   Physical Therapy Treatment Note      Name: Leticia Law  [facility] Number: [id number]    Therapy Diagnosis:   Encounter Diagnosis   Name Primary?    Chronic bilateral low back pain without sciatica Yes     Physician: Milan Dial PA-C    Visit Date: 2/6/2025    Physician Orders: PT Eval and Treat   Medical Diagnosis from Referral:   S39.012A (ICD-10-CM) - Back strain, initial encounter   Y99.0 (ICD-10-CM) - Work related injury      Evaluation Date: 1/8/2025  Authorization Period Expiration: 01/06/2026  Plan of Care Expiration: 1/8/2025 to 03/07/2025  Visit # / Visits authorized: 6/12 FOTO: 6/7 PTA visits: 0/5  (# of No Show Appts 1/Number of Cancelled Appts 0)     Time In: 1300  Time Out: 1355  Total Appointment Time (timed & untimed codes): 50 minutes (3 TH, 1 NM)  Precautions: Wcomp, DM, hypertension    Occupation/job title:              at [employer] [place]  Job demands:                       Bending/stooping, moving hospital room furniture, pushing/pulling  Current work status:            Light duty  Current work restrictions:   Light duty. Restrictions: No lifting/pushing/pulling more than 25 lbs, Avoid frequent bending/lifting/twisting, Home today (May begin restricted duty next scheduled work day.)   Previous work status:          Full duty  Date last worked (if applicable): today.     Subjective     Pt reports: to OPPT after work.  LBP upon arrival. Upset about job not being able to accommodate her therapy appointments and work restrictions.   She was compliant with home exercise program.  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 Pulling/pushing Frequently Occasionally   2. Lifting 50# Frequently  "Occasionally   3. Bending/stooping frequently Occasionally      FOTO: Limitation: 74%              Predicted limitation: 54%              CARINA: 67%    Treatment     Neuromuscular re-education activities to improve: Balance, Coordination, Kinesthetic, Sense, Proprioception, and Posture for 15 minutes:  Seated lumbar flexion peanut 4x10  SAPD green tB 2x15 phase I-III    Therapeutic activities to improve functional performance for 40 minutes:  Bridges 3x10  TrX [id number]  STS 19" 3x10 10#  Pallof press greenTb 2x10/each  Deadlift 9" step 15# kettlebell 4x5  Walking laps 3x/3 rounds (intermittent t/o session)    Patient Education and Home Exercises       Education provided:   - none    Home Exercises Provided: Patient instructed to cont prior HEP. Exercises were reviewed and Leticia was able to demonstrate them prior to the end of the session.  Leticia demonstrated fair  understanding of the education provided. See EMR under Patient Instructions for exercises provided during therapy sessions    Assessment   Eval: Leticia OLIVA is a 49 y.o. referred to outpatient Physical Therapy with a medical diagnosis of S39.012A (ICD-10-CM) - Back strain, initial encounter, Y99.0 (ICD-10-CM) - Work related injury. Injured her back at work lifting/flipping over a recliner chair in March 2024. Did not report initial injury to employer.  Continued to work until present time. Right flank and lumbar area pain > left. Pain aggravated by lifting and forward trunk positions of work. Clinical examination today reveals no radicular symptoms, normal BLE strength and range of motion throughout, core and lumbar weakness, pain with loaded lumbar flexion, fear avoidance with bending/stooping, and overall moderate lumbar tissue irritability.      Interval:  Continued lumbar range of motion/flexibility program and lumbar stabilization program with good tolerance. Trial of weight deadlifts today with fair tolerance; need for cueing for technique. Noted " weak upper extremity muscles. Overall low tissue irritability during session.     The patient's current job specific task deficits include the following: bending, stooping, lifting, walking, pushing/pulling, reaching overhead, working overhead.     Leticia is making progress towards meeting her goals.     Patient prognosis is: Good.   Rehab potential is: Good    Pt will continue to benefit from skilled Physical Therapy interventions in order to address the deficits listed in the problem list box on initial evaluation, provide education, and to address the musculoskeletal limitations and work-related functional deficits for their job as a .  Pt's spiritual, cultural and educational needs considered and pt agreeable to plan of care and goals.     Anticipated barriers to physical therapy: transportation and chronicity of current injury     Goals:   Short Terms Goals: 3 weeks     Goal  Progress  Date    (1)  Patient will be I with HOME EXERCISE PROGRAM  Initial, Not Met     (2)  Patient will demonstrate standing forward bending x 10 reps without pain  Initial, Not Met      (3)   Patient will demonstrate ability to push/pull 50# without lumbar pain  Initial, Not Met         Long Term Goals: 6-8 weeks     Goal  Progress  Date    (1)  Patient will demonstrate ability to lift 40# knees to chest without lumbar pain Initial, Not Met      (2)   Patient will report >10% improvement in CARINA score.   Initial, Not Met     (3)   Patient will report no acute exacerbations of lumbar pain with modified/light dut work activities.   Initial, Not Met           Plan   Lumbar stabilization program  Squat progression for lifting tolerance improvement    Nathan Weston, PT, DPT, OCS  2/6/2025

## 2025-02-06 NOTE — TELEPHONE ENCOUNTER
Ms. Law returned the call.  She feels she is still being asked to do things at work that do not follow her restrictions.  I will contact  regarding the concern.

## 2025-02-10 ENCOUNTER — OFFICE VISIT (OUTPATIENT)
Dept: FAMILY MEDICINE | Facility: HOSPITAL | Age: 50
End: 2025-02-10
Payer: MEDICARE

## 2025-02-10 ENCOUNTER — CLINICAL SUPPORT (OUTPATIENT)
Dept: REHABILITATION | Facility: HOSPITAL | Age: 50
End: 2025-02-10
Payer: COMMERCIAL

## 2025-02-10 VITALS
BODY MASS INDEX: 41.02 KG/M2 | HEART RATE: 81 BPM | OXYGEN SATURATION: 99 % | DIASTOLIC BLOOD PRESSURE: 57 MMHG | WEIGHT: 293 LBS | HEIGHT: 71 IN | SYSTOLIC BLOOD PRESSURE: 101 MMHG

## 2025-02-10 DIAGNOSIS — M54.50 CHRONIC BILATERAL LOW BACK PAIN WITHOUT SCIATICA: Primary | ICD-10-CM

## 2025-02-10 DIAGNOSIS — E03.9 HYPOTHYROIDISM, UNSPECIFIED TYPE: ICD-10-CM

## 2025-02-10 DIAGNOSIS — F41.9 ANXIETY: ICD-10-CM

## 2025-02-10 DIAGNOSIS — I10 PRIMARY HYPERTENSION: ICD-10-CM

## 2025-02-10 DIAGNOSIS — G89.29 CHRONIC BILATERAL LOW BACK PAIN WITHOUT SCIATICA: Primary | ICD-10-CM

## 2025-02-10 DIAGNOSIS — E11.9 TYPE 2 DIABETES MELLITUS WITHOUT COMPLICATION, WITHOUT LONG-TERM CURRENT USE OF INSULIN: Primary | ICD-10-CM

## 2025-02-10 PROCEDURE — 97112 NEUROMUSCULAR REEDUCATION: CPT | Mod: PN,CQ

## 2025-02-10 PROCEDURE — 99215 OFFICE O/P EST HI 40 MIN: CPT

## 2025-02-10 PROCEDURE — 97530 THERAPEUTIC ACTIVITIES: CPT | Mod: PN,CQ

## 2025-02-10 RX ORDER — METFORMIN HYDROCHLORIDE 500 MG/1
1000 TABLET ORAL
Qty: 180 TABLET | Refills: 3 | Status: SHIPPED | OUTPATIENT
Start: 2025-02-10 | End: 2026-02-10

## 2025-02-10 NOTE — PROGRESS NOTES
[facility]    Subjective:     Leticia Law is a 49 y.o. year old female with PMHx of HTN, T2DM, HLD, obesity, depression, anxiety, and hypothyroidism who presents to clinic for follow up.    HTN: BP today 101/57. Well-controlled. Patient denies any SOB, lightheadedness, dizziness, palpitations, chest pain, or vision changes. Patient endorses compliance with medication regimen, including enalapriL-hydrochlorothiazide (VASERETIC) 10-.     DM: Last A1c 7.1% (2024); patient does complain of numbness/tingling in bilateral feet. Patient admits polyuria, polydipsia, polyphagia. Patient endorses compliance with medication regimen, including Metformin. She has been out of [facility] for 3 months, unable to get at [employer] due to supply not available. Denies any episodes of hypoglycemia.    Last Urine Microalbumin- May 2023  Last eye exam- due for one, needs a doc  Last foot exam- will do today    She is currently in the process for evaluation for bariatric surgery - needs sleep study, therapy, dietician.     Immunization - due for Pneumococcal, agrees will get next time as she is afraid of needles    Patient Active Problem List    Diagnosis Date Noted    Chronic bilateral low back pain without sciatica 2025    Morbid obesity with BMI of 50.0-59.9, adult 2024    HTN (hypertension) 10/21/2014    DM (diabetes mellitus) 10/21/2014    Hypercholesteremia 10/21/2014        Review of patient's allergies indicates:  No Known Allergies     Past Medical History:   Diagnosis Date    Depression     Diabetes mellitus     Hyperlipidemia     Hypertension     Thyroid disease     possible hypthyroid disease      Past Surgical History:   Procedure Laterality Date     SECTION      TUBAL LIGATION        Family History   Problem Relation Name Age of Onset    Breast cancer Maternal Cousin        Social History     Tobacco Use    Smoking status: Never    Smokeless tobacco: Never   Substance Use Topics     "Alcohol use: Yes     Comment: OCC        Objective:     Vitals:    02/10/25 1342   BP: (!) 101/57   Patient Position: Sitting   Pulse: 81   SpO2: 99%   Weight: (!) 180.6 kg (398 lb 2.4 oz)   Height: 5' 11" (1.803 m)     Body mass index is 55.53 kg/m².    Physical Exam  Vitals reviewed.   Constitutional:       General: She is not in acute distress.     Appearance: She is not toxic-appearing.   Eyes:      Extraocular Movements: Extraocular movements intact.   Cardiovascular:      Rate and Rhythm: Normal rate and regular rhythm.      Heart sounds: Normal heart sounds.   Pulmonary:      Effort: Pulmonary effort is normal. No respiratory distress.      Breath sounds: Normal breath sounds.   Musculoskeletal:      Right foot: Normal range of motion.      Left foot: Normal range of motion.   Feet:      Right foot:      Protective Sensation: 5 sites tested.  5 sites sensed.      Skin integrity: No ulcer, blister, skin breakdown, erythema or warmth.      Toenail Condition: Fungal disease present.     Left foot:      Protective Sensation: 5 sites tested.  5 sites sensed.      Skin integrity: No ulcer, blister, skin breakdown, erythema or warmth.      Toenail Condition: Fungal disease present.  Skin:     General: Skin is warm.   Neurological:      Mental Status: She is alert. Mental status is at baseline.   Psychiatric:         Mood and Affect: Mood normal.         Behavior: Behavior normal.         Thought Content: Thought content normal.         Assessment/Plan:     Leticia Law is a 49 y.o. year old female who presents to clinic for follow up.    1. Type 2 diabetes mellitus without complication, without long-term current use of insulin (Primary)  - Last A1c controlled but based on symptoms as well as being off Mounjaro for several months, suspect it is uncontrolled  - Repeat Hemoglobin A1C today  - Comprehensive Metabolic Panel; Future  - CBC Auto Differential; Future  - Lipid Panel; Future  - Microalbumin/creatinine " urine ratio; Future  - Started patient back on tirzepatide 7.5 mg/0.5 mL PnIj; Inject 7.5 mg into the skin every 7 days.  Dispense: 2 mL; Refill: 2. Discussed risks and benefits of medication with patient. Discussed common side effects of medication.   - Ambulatory referral/consult to Ophthalmology; Future  - Increased Metformin from 500 mg to 1000 mg daily for better control; metFORMIN (GLUCOPHAGE) 500 MG tablet; Take 2 tablets (1,000 mg total) by mouth daily with breakfast.  Dispense: 180 tablet; Refill: 3    2. Primary hypertension  - Well controlled w/ current regimen  enalapriL-hydrochlorothiazide (VASERETIC) 10-25    3. Hypothyroidism, unspecified type  - Controlled w/ levothyroxine 200 mcg  - Repeat TSH; Future    4. Anxiety  - Chronic, controlled  - Would like referral to Psych for therapy given she is undergoing bariatric surgery evaluation and it is required  - Ambulatory referral/consult to Psychiatry; Future    Follow-up:1 month for DM    Case discussed with staff: LINSEY Israel MD  [facility], PGY-3  02/10/2025

## 2025-02-10 NOTE — PROGRESS NOTES
[facility]   Physical Therapy Treatment Note      Name: Leticia Law  [facility] Number: [id number]    Therapy Diagnosis:   Encounter Diagnosis   Name Primary?    Chronic bilateral low back pain without sciatica Yes       Physician: Milan Dial PA-C    Visit Date: 2/10/2025    Physician Orders: PT Eval and Treat   Medical Diagnosis from Referral:   S39.012A (ICD-10-CM) - Back strain, initial encounter   Y99.0 (ICD-10-CM) - Work related injury      Evaluation Date: 1/8/2025  Authorization Period Expiration: 01/06/2026  Plan of Care Expiration: 1/8/2025 to 03/07/2025  Visit # / Visits authorized: 8/12 FOTO: 6/7 PTA visits: 1/5  (# of No Show Appts 1/Number of Cancelled Appts 0)     Time In: 1210  Time Out: 1300  Total Appointment Time (timed & untimed codes): 50 minutes (3 TH, 1 NM)  Precautions: Wcomp, DM, hypertension    Occupation/job title:              at [employer] [facility] [place]  Job demands:                       Bending/stooping, moving hospital room furniture, pushing/pulling  Current work status:            Light duty  Current work restrictions:   Light duty. Restrictions: No lifting/pushing/pulling more than 25 lbs, Avoid frequent bending/lifting/twisting, Home today (May begin restricted duty next scheduled work day.)   Previous work status:          Full duty  Date last worked (if applicable): today.     Subjective     Pt reports: to OPPT after work.  LBP upon arrival. Upset about job not being able to accommodate her therapy appointments and work restrictions.   She was compliant with home exercise program.  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 Pulling/pushing Frequently Occasionally   2. Lifting 50# Frequently  "Occasionally   3. Bending/stooping frequently Occasionally      FOTO: Limitation: 74%              Predicted limitation: 54%              CARINA: 67%    Treatment     Neuromuscular re-education activities to improve: Balance, Coordination, Kinesthetic, Sense, Proprioception, and Posture for 15 minutes:  Seated lumbar flexion peanut 4x10  SAPD green tB 2x15 phase I-III    Therapeutic activities to improve functional performance for 40 minutes:  Bridges 3x10  TrX [id number]  STS 19" 3x10 10#  Pallof press greenTb 2x10/each  Deadlift 9" step 15# kettlebell 4x5  Walking laps 3x/3 rounds (intermittent t/o session)    Patient Education and Home Exercises       Education provided:   - none    Home Exercises Provided: Patient instructed to cont prior HEP. Exercises were reviewed and Leticia was able to demonstrate them prior to the end of the session.  Leticia demonstrated fair  understanding of the education provided. See EMR under Patient Instructions for exercises provided during therapy sessions    Assessment   Eval: Leticia OLIVA is a 49 y.o. referred to outpatient Physical Therapy with a medical diagnosis of S39.012A (ICD-10-CM) - Back strain, initial encounter, Y99.0 (ICD-10-CM) - Work related injury. Injured her back at work lifting/flipping over a recliner chair in March 2024. Did not report initial injury to employer.  Continued to work until present time. Right flank and lumbar area pain > left. Pain aggravated by lifting and forward trunk positions of work. Clinical examination today reveals no radicular symptoms, normal BLE strength and range of motion throughout, core and lumbar weakness, pain with loaded lumbar flexion, fear avoidance with bending/stooping, and overall moderate lumbar tissue irritability.      Interval:  Continued lumbar range of motion/flexibility program and lumbar stabilization program with good tolerance. Reports mild to moderate muscle soreness but otherwise no adverse response to previous " session. Better tolerance to deadlifts reporting just moderate fatigue. Monitor response.     The patient's current job specific task deficits include the following: bending, stooping, lifting, walking, pushing/pulling, reaching overhead, working overhead.     Leticia is making progress towards meeting her goals.     Patient prognosis is: Good.   Rehab potential is: Good    Pt will continue to benefit from skilled Physical Therapy interventions in order to address the deficits listed in the problem list box on initial evaluation, provide education, and to address the musculoskeletal limitations and work-related functional deficits for their job as a .  Pt's spiritual, cultural and educational needs considered and pt agreeable to plan of care and goals.     Anticipated barriers to physical therapy: transportation and chronicity of current injury     Goals:   Short Terms Goals: 3 weeks     Goal  Progress  Date    (1)  Patient will be I with HOME EXERCISE PROGRAM  Initial, Not Met     (2)  Patient will demonstrate standing forward bending x 10 reps without pain  Initial, Not Met      (3)   Patient will demonstrate ability to push/pull 50# without lumbar pain  Initial, Not Met         Long Term Goals: 6-8 weeks     Goal  Progress  Date    (1)  Patient will demonstrate ability to lift 40# knees to chest without lumbar pain Initial, Not Met      (2)   Patient will report >10% improvement in CARINA score.   Initial, Not Met     (3)   Patient will report no acute exacerbations of lumbar pain with modified/light dut work activities.   Initial, Not Met           Plan   Lumbar stabilization program  Squat progression for lifting tolerance improvement    Andreina Quarles, PTA  2/10/2025

## 2025-02-13 ENCOUNTER — CLINICAL SUPPORT (OUTPATIENT)
Dept: REHABILITATION | Facility: HOSPITAL | Age: 50
End: 2025-02-13
Payer: COMMERCIAL

## 2025-02-13 DIAGNOSIS — M54.50 CHRONIC BILATERAL LOW BACK PAIN WITHOUT SCIATICA: Primary | ICD-10-CM

## 2025-02-13 DIAGNOSIS — G89.29 CHRONIC BILATERAL LOW BACK PAIN WITHOUT SCIATICA: Primary | ICD-10-CM

## 2025-02-13 PROCEDURE — 97112 NEUROMUSCULAR REEDUCATION: CPT | Mod: PN

## 2025-02-13 PROCEDURE — 97530 THERAPEUTIC ACTIVITIES: CPT | Mod: PN

## 2025-02-13 NOTE — PROGRESS NOTES
[facility]   Physical Therapy Treatment Note      Name: Leticia Law  [facility] Number: [id number]    Therapy Diagnosis:   Encounter Diagnosis   Name Primary?    Chronic bilateral low back pain without sciatica Yes         Physician: Milan Dial PA-C    Visit Date: 2/13/2025    Physician Orders: PT Eval and Treat   Medical Diagnosis from Referral:   S39.012A (ICD-10-CM) - Back strain, initial encounter   Y99.0 (ICD-10-CM) - Work related injury      Evaluation Date: 1/8/2025  Authorization Period Expiration: 01/06/2026  Plan of Care Expiration: 1/8/2025 to 03/07/2025  Visit # / Visits authorized: 9/12 FOTO: 9/11 PTA visits: 0/5  (# of No Show Appts 1/Number of Cancelled Appts 1)     Time In: 1300  Time Out: 1355  Total Appointment Time (timed & untimed codes): 55 minutes (3 TH, 1 NM)  Precautions: Wcomp, DM, hypertension    Occupation/job title:              at [employer] [place]  Job demands:                       Bending/stooping, moving hospital room furniture, pushing/pulling  Current work status:            Light duty  Current work restrictions:   Light duty. Restrictions: No lifting/pushing/pulling more than 25 lbs, Avoid frequent bending/lifting/twisting, Home today (May begin restricted duty next scheduled work day.)   Previous work status:          Full duty  Date last worked (if applicable): today.     Subjective     Pt reports: to OPPT after work.  LBP upon arrival. Voices high level/   She was compliant with home exercise program.  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 Pulling/pushing Frequently Occasionally   2. Lifting 50# Frequently Occasionally   3. Bending/stooping frequently Occasionally      FOTO:  "Limitation: 74%              Predicted limitation: 54%              CARINA: 67%    No spasm appreciated. Mild TTP right flank area.   Comparable sign: left rotation.     Treatment     Neuromuscular re-education activities to improve: Balance, Coordination, Kinesthetic, Sense, Proprioception, and Posture for 15 minutes:  Seated lumbar flexion peanut 4x10  SAPD green tB 2x15 phase I-III  Seated lat pull downs CC 10# 2x15    Therapeutic activities to improve functional performance for 40 minutes:  TrX [id number]  STS 19" 3x10 10#  Pallof press greenTb 2x10/each NP  Deadlift 9" step 20# kettlebell 3x8-10  Walking laps 3x/3 rounds (intermittent t/o session)  Push sled 50# 4x25'    Patient Education and Home Exercises       Education provided:   - none    Home Exercises Provided: Patient instructed to cont prior HEP. Exercises were reviewed and Leticia was able to demonstrate them prior to the end of the session.  Leticia demonstrated fair  understanding of the education provided. See EMR under Patient Instructions for exercises provided during therapy sessions    Assessment   Eval: Leticia OLIVA is a 49 y.o. referred to outpatient Physical Therapy with a medical diagnosis of S39.012A (ICD-10-CM) - Back strain, initial encounter, Y99.0 (ICD-10-CM) - Work related injury. Injured her back at work lifting/flipping over a recliner chair in March 2024. Did not report initial injury to employer.  Continued to work until present time. Right flank and lumbar area pain > left. Pain aggravated by lifting and forward trunk positions of work. Clinical examination today reveals no radicular symptoms, normal BLE strength and range of motion throughout, core and lumbar weakness, pain with loaded lumbar flexion, fear avoidance with bending/stooping, and overall moderate lumbar tissue irritability.      Interval:  Continued lumbar range of motion/flexibility program and lumbar stabilization program with good tolerance. Tolerating all " progressions with voice of minimal back pain during although voicing high subjective pain upon arrival. Patient prefers to bend by hip hinging. Working on neuromuscular re-education for incorporating more hip-knee symmetrical pattern.     The patient's current job specific task deficits include the following: bending, stooping, lifting, walking, pushing/pulling, reaching overhead, working overhead.     Leticia is making progress towards meeting her goals.     Patient prognosis is: Good.   Rehab potential is: Good    Pt will continue to benefit from skilled Physical Therapy interventions in order to address the deficits listed in the problem list box on initial evaluation, provide education, and to address the musculoskeletal limitations and work-related functional deficits for their job as a .  Pt's spiritual, cultural and educational needs considered and pt agreeable to plan of care and goals.     Anticipated barriers to physical therapy: transportation and chronicity of current injury     Goals:   Short Terms Goals: 3 weeks     Goal  Progress  Date    (1)  Patient will be I with HOME EXERCISE PROGRAM  Initial, Not Met Met 2/13/2025      (2)  Patient will demonstrate standing forward bending x 10 reps without pain  Initial, Not Met      (3)   Patient will demonstrate ability to push/pull 50# without lumbar pain  Initial, Not Met  Met 2/13/2025         Long Term Goals: 6-8 weeks     Goal  Progress  Date    (1)  Patient will demonstrate ability to lift 40# knees to chest without lumbar pain Initial, Not Met      (2)   Patient will report >10% improvement in CARINA score.   Initial, Not Met     (3)   Patient will report no acute exacerbations of lumbar pain with modified/light dut work activities.   Initial, Not Met           Plan   Lumbar stabilization program  Squat progression for lifting tolerance improvement    Nathan Weston, PT, DPT, OCS  2/13/2025

## 2025-02-14 ENCOUNTER — TELEPHONE (OUTPATIENT)
Dept: URGENT CARE | Facility: CLINIC | Age: 50
End: 2025-02-14
Payer: MEDICARE

## 2025-02-14 NOTE — TELEPHONE ENCOUNTER
I called patient to let her know that I had discussed her case with HR in regards to concern over her restrictions being followed.  We discussed proper ergonomics and techniques likes sliding the chair so that she doesn't have to bend beneath it to clean/moving the rolling bed to avoid frequent bending.  She is also now part of a [employer] system for doing her work, so she should have someone available to help her with any tasks that are not within her restrictions.  She reports PT is teaching her better ways to do her job without aggravating her injury as well.  She will follow up as scheduled in clinic.

## 2025-02-19 ENCOUNTER — CLINICAL SUPPORT (OUTPATIENT)
Dept: REHABILITATION | Facility: HOSPITAL | Age: 50
End: 2025-02-19
Payer: COMMERCIAL

## 2025-02-19 DIAGNOSIS — G89.29 CHRONIC BILATERAL LOW BACK PAIN WITHOUT SCIATICA: Primary | ICD-10-CM

## 2025-02-19 DIAGNOSIS — M54.50 CHRONIC BILATERAL LOW BACK PAIN WITHOUT SCIATICA: Primary | ICD-10-CM

## 2025-02-19 PROCEDURE — 97112 NEUROMUSCULAR REEDUCATION: CPT | Mod: PN

## 2025-02-19 PROCEDURE — 97530 THERAPEUTIC ACTIVITIES: CPT | Mod: PN

## 2025-02-19 NOTE — PROGRESS NOTES
[facility]   Physical Therapy Treatment Note      Name: Leticia Lwa  [facility] Number: [id number]    Therapy Diagnosis:   Encounter Diagnosis   Name Primary?    Chronic bilateral low back pain without sciatica Yes         Physician: Milan Dial PA-C    Visit Date: 2/19/2025    Physician Orders: PT Eval and Treat   Medical Diagnosis from Referral:   S39.012A (ICD-10-CM) - Back strain, initial encounter   Y99.0 (ICD-10-CM) - Work related injury      Evaluation Date: 1/8/2025  Authorization Period Expiration: 01/06/2026  Plan of Care Expiration: 1/8/2025 to 03/07/2025  Visit # / Visits authorized: 10/12 FOTO: 10/11 PTA visits: 0/5  (# of No Show Appts 1/Number of Cancelled Appts 2)     Time In: 1500  Time Out: 1555  Total Appointment Time (timed & untimed codes): 55 minutes (3 TH, 1 NM)  Precautions: Wcomp, DM, hypertension    Occupation/job title:              at [employer] [facility] [place]  Job demands:                       Bending/stooping, moving hospital room furniture, pushing/pulling  Current work status:            Light duty  Current work restrictions:   Light duty. Restrictions: No lifting/pushing/pulling more than 25 lbs, Avoid frequent bending/lifting/twisting, Home today (May begin restricted duty next scheduled work day.)   Previous work status:          Full duty  Date last worked (if applicable): today.     Subjective     Pt reports: comes to [facility]PPT after work today. Overall lumbar pain feeling better.   She was compliant with home exercise program.  Response to previous treatment: bilateral muscle soreness following last session.   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 Pulling/pushing Frequently Occasionally   2. Lifting 50# Frequently Occasionally   3. Bending/stooping  [facility]  [address]  [phone]               Thank you for choosing us for your health care visit with Froy Donato NP.   We are glad to serve you and happy to provide you with this summary of your vis "frequently Occasionally      FOTO: Limitation: 74%              Predicted limitation: 54%              CARINA: 67%    No spasm appreciated. Mild TTP right flank area.   Comparable sign: left rotation.     Treatment     Neuromuscular re-education activities to improve: Balance, Coordination, Kinesthetic, Sense, Proprioception, and Posture for 15 minutes:  Seated lumbar flexion peanut 4x10  SAPD green tB 2x15 phase I-III  Seated lat pull downs CC 10# 2x15    Therapeutic activities to improve functional performance for 40 minutes:  TrX squats 3x15  STS 19" 3x10 10#  Pallof press greenTb 2x10/each   Deadlift 9" step 20# kettlebell 3x10  Walking laps 3x/3 rounds (intermittent t/o session)  Push sled 60# 4x25'    Patient Education and Home Exercises       Education provided:   - none    Home Exercises Provided: Patient instructed to cont prior HEP. Exercises were reviewed and Leticia was able to demonstrate them prior to the end of the session.  Leticia demonstrated fair  understanding of the education provided. See EMR under Patient Instructions for exercises provided during therapy sessions    Assessment   Eval: Leticia OLIVA is a 49 y.o. referred to outpatient Physical Therapy with a medical diagnosis of S39.012A (ICD-10-CM) - Back strain, initial encounter, Y99.0 (ICD-10-CM) - Work related injury. Injured her back at work lifting/flipping over a recliner chair in March 2024. Did not report initial injury to employer.  Continued to work until present time. Right flank and lumbar area pain > left. Pain aggravated by lifting and forward trunk positions of work. Clinical examination today reveals no radicular symptoms, normal BLE strength and range of motion throughout, core and lumbar weakness, pain with loaded lumbar flexion, fear avoidance with bending/stooping, and overall moderate lumbar tissue irritability.      Interval: Continued lumbar range of motion/flexibility program and lumbar stabilization program with good " oral rehydration. Go back to your child’s normal diet as soon as possible. If your child has diarrhea, don’t give juice, flavored gelatin water, soft drinks without caffeine, lemonade, fruit drinks, or popsicles. This may make diarrhea worse. · Food.  If y prescribed. In infants older than 10months of age, you may use ibuprofen instead of acetaminophen. If your child has chronic liver or kidney disease, talk with your child’s provider before using these medicines.  Also talk with the provider if your child ha tolerance. Tolerating all progressions with voice of minimal back pain. Patient prefers to bend with straight knees; continue to work on deadlift technique. Working on neuromuscular re-education for incorporating more hip-knee symmetrical pattern.     The patient's current job specific task deficits include the following: bending, stooping, lifting, walking, pushing/pulling, reaching overhead, working overhead.     Leticia is making progress towards meeting her goals.     Patient prognosis is: Good.   Rehab potential is: Good    Pt will continue to benefit from skilled Physical Therapy interventions in order to address the deficits listed in the problem list box on initial evaluation, provide education, and to address the musculoskeletal limitations and work-related functional deficits for their job as a .  Pt's spiritual, cultural and educational needs considered and pt agreeable to plan of care and goals.     Anticipated barriers to physical therapy: transportation and chronicity of current injury     Goals:   Short Terms Goals: 3 weeks     Goal  Progress  Date    (1)  Patient will be I with HOME EXERCISE PROGRAM  Initial, Not Met Met 2/19/2025      (2)  Patient will demonstrate standing forward bending x 10 reps without pain  Initial, Not Met   Met 02/19/2025   (3)   Patient will demonstrate ability to push/pull 50# without lumbar pain  Initial, Not Met  Met 2/19/2025         Long Term Goals: 6-8 weeks     Goal  Progress  Date    (1)  Patient will demonstrate ability to lift 40# knees to chest without lumbar pain Initial, Not Met      (2)   Patient will report >10% improvement in CARINA score.   Initial, Not Met     (3)   Patient will report no acute exacerbations of lumbar pain with modified/light dut work activities.   Initial, Not Met           Plan   Lumbar stabilization program  Squat progression for lifting tolerance improvement    Nathan Weston, PT, DPT, OCS  2/19/2025           substitute for professional medical care. Always follow your healthcare professional's instructions. Follow Up with Our Office     Return if symptoms worsen or fail to improve.       Allergies as of Jan 17, 2017     No Known Allergies Healthy nutrition starts as early as infancy with breastfeeding. Once your baby begins eating solid foods, introduce nutritious foods early on and often. Sometimes toddlers need to try a food 10 times before they actually accept and enjoy it.  It is also im

## 2025-02-20 ENCOUNTER — CLINICAL SUPPORT (OUTPATIENT)
Dept: REHABILITATION | Facility: HOSPITAL | Age: 50
End: 2025-02-20
Payer: COMMERCIAL

## 2025-02-20 DIAGNOSIS — G89.29 CHRONIC BILATERAL LOW BACK PAIN WITHOUT SCIATICA: Primary | ICD-10-CM

## 2025-02-20 DIAGNOSIS — M54.50 CHRONIC BILATERAL LOW BACK PAIN WITHOUT SCIATICA: Primary | ICD-10-CM

## 2025-02-20 PROCEDURE — 97530 THERAPEUTIC ACTIVITIES: CPT | Mod: PN,CQ

## 2025-02-20 PROCEDURE — 97112 NEUROMUSCULAR REEDUCATION: CPT | Mod: PN,CQ

## 2025-02-20 NOTE — PROGRESS NOTES
[facility]   Physical Therapy Treatment Note      Name: Leticia Law  [facility] Number: [id number]    Therapy Diagnosis:   Encounter Diagnosis   Name Primary?    Chronic bilateral low back pain without sciatica Yes         Physician: Milan Dial PA-C    Visit Date: 2/20/2025    Physician Orders: PT Eval and Treat   Medical Diagnosis from Referral:   S39.012A (ICD-10-CM) - Back strain, initial encounter   Y99.0 (ICD-10-CM) - Work related injury      Evaluation Date: 1/8/2025  Authorization Period Expiration: 01/06/2026  Plan of Care Expiration: 1/8/2025 to 03/07/2025  Visit # / Visits authorized: 10/12 FOTO: 10/11 PTA visits: 1/5  (# of No Show Appts 1/Number of Cancelled Appts 2)     Time In: 1400  Time Out: 1455  Total Appointment Time (timed & untimed codes): 55 minutes (3 TH, 1 NM)  Precautions: Wcomp, DM, hypertension    Occupation/job title:              at [employer]  Job demands:                       Bending/stooping, moving hospital room furniture, pushing/pulling  Current work status:            Light duty  Current work restrictions:   Light duty. Restrictions: No lifting/pushing/pulling more than 25 lbs, Avoid frequent bending/lifting/twisting, Home today (May begin restricted duty next scheduled work day.)   Previous work status:          Full duty  Date last worked (if applicable): today.     Subjective     Pt reports: comes to OPPT after work today. Fatigued.   She was compliant with home exercise program.  Response to previous treatment: bilateral muscle soreness following last session.   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 Pulling/pushing Frequently Occasionally   2. Lifting 50# Frequently Occasionally   3. Bending/stooping frequently Occasionally     "  FOTO: Limitation: 74%              Predicted limitation: 54%              CARINA: 67%    No spasm appreciated. Mild TTP right flank area.   Comparable sign: left rotation.     Treatment     Neuromuscular re-education activities to improve: Balance, Coordination, Kinesthetic, Sense, Proprioception, and Posture for 15 minutes:  Seated lumbar flexion peanut 4x10  SAPD green tB 2x15 phase I-III  Seated lat pull downs CC 10# 2x15    Therapeutic activities to improve functional performance for 40 minutes:  TrX squats 3x15  STS 19" 3x10 10#  Pallof press greenTb 2x10/each   Deadlift 9" step 20# kettlebell 3x10  Walking laps 3x/3 rounds (intermittent t/o session)  Push sled 60# 4x25' OOT    Patient Education and Home Exercises       Education provided:   - none    Home Exercises Provided: Patient instructed to cont prior HEP. Exercises were reviewed and Leticia was able to demonstrate them prior to the end of the session.  Leticia demonstrated fair  understanding of the education provided. See EMR under Patient Instructions for exercises provided during therapy sessions    Assessment   Eval: Leticia OLIVA is a 49 y.o. referred to outpatient Physical Therapy with a medical diagnosis of S39.012A (ICD-10-CM) - Back strain, initial encounter, Y99.0 (ICD-10-CM) - Work related injury. Injured her back at work lifting/flipping over a recliner chair in March 2024. Did not report initial injury to employer.  Continued to work until present time. Right flank and lumbar area pain > left. Pain aggravated by lifting and forward trunk positions of work. Clinical examination today reveals no radicular symptoms, normal BLE strength and range of motion throughout, core and lumbar weakness, pain with loaded lumbar flexion, fear avoidance with bending/stooping, and overall moderate lumbar tissue irritability.      Interval: Continued lumbar range of motion/flexibility program and lumbar stabilization program with good tolerance. Tolerating program " well but moderate fatigue noted today requiring rest breaks as needed. Working on neuromuscular re-education for incorporating more hip-knee symmetrical pattern.     The patient's current job specific task deficits include the following: bending, stooping, lifting, walking, pushing/pulling, reaching overhead, working overhead.     Leticia is making progress towards meeting her goals.     Patient prognosis is: Good.   Rehab potential is: Good    Pt will continue to benefit from skilled Physical Therapy interventions in order to address the deficits listed in the problem list box on initial evaluation, provide education, and to address the musculoskeletal limitations and work-related functional deficits for their job as a .  Pt's spiritual, cultural and educational needs considered and pt agreeable to plan of care and goals.     Anticipated barriers to physical therapy: transportation and chronicity of current injury     Goals:   Short Terms Goals: 3 weeks     Goal  Progress  Date    (1)  Patient will be I with HOME EXERCISE PROGRAM  Initial, Not Met Met 2/20/2025      (2)  Patient will demonstrate standing forward bending x 10 reps without pain  Initial, Not Met   Met 02/19/2025   (3)   Patient will demonstrate ability to push/pull 50# without lumbar pain  Initial, Not Met  Met 2/20/2025         Long Term Goals: 6-8 weeks     Goal  Progress  Date    (1)  Patient will demonstrate ability to lift 40# knees to chest without lumbar pain Initial, Not Met      (2)   Patient will report >10% improvement in CARINA score.   Initial, Not Met     (3)   Patient will report no acute exacerbations of lumbar pain with modified/light dut work activities.   Initial, Not Met           Plan   Lumbar stabilization program  Squat progression for lifting tolerance improvement    Andreina Quarles, PTA  2/20/2025

## 2025-02-21 ENCOUNTER — TELEPHONE (OUTPATIENT)
Dept: URGENT CARE | Facility: CLINIC | Age: 50
End: 2025-02-21
Payer: MEDICARE

## 2025-02-21 ENCOUNTER — OFFICE VISIT (OUTPATIENT)
Dept: URGENT CARE | Facility: CLINIC | Age: 50
End: 2025-02-21
Payer: COMMERCIAL

## 2025-02-21 VITALS
BODY MASS INDEX: 41.02 KG/M2 | RESPIRATION RATE: 20 BRPM | HEIGHT: 71 IN | WEIGHT: 293 LBS | SYSTOLIC BLOOD PRESSURE: 110 MMHG | TEMPERATURE: 98 F | DIASTOLIC BLOOD PRESSURE: 70 MMHG | OXYGEN SATURATION: 100 % | HEART RATE: 84 BPM

## 2025-02-21 DIAGNOSIS — M54.9 DORSALGIA, UNSPECIFIED: ICD-10-CM

## 2025-02-21 DIAGNOSIS — S39.012A ACUTE MYOFASCIAL STRAIN OF LUMBAR REGION, INITIAL ENCOUNTER: Primary | ICD-10-CM

## 2025-02-21 DIAGNOSIS — G89.29 CHRONIC RIGHT-SIDED LOW BACK PAIN WITHOUT SCIATICA: ICD-10-CM

## 2025-02-21 DIAGNOSIS — M54.50 CHRONIC RIGHT-SIDED LOW BACK PAIN WITHOUT SCIATICA: ICD-10-CM
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
## 2025-02-21 DIAGNOSIS — Z02.6 ENCOUNTER RELATED TO WORKER'S COMPENSATION CLAIM: ICD-10-CM

## 2025-02-21 RX ORDER — ACETAMINOPHEN 500 MG
1000 TABLET ORAL EVERY 6 HOURS PRN
Qty: 60 TABLET | Refills: 0 | Status: SHIPPED | OUTPATIENT
Start: 2025-02-21 | End: 2026-02-21

## 2025-02-21 RX ORDER — METHOCARBAMOL 500 MG/1
1000 TABLET, FILM COATED ORAL NIGHTLY PRN
Qty: 30 TABLET | Refills: 0 | Status: SHIPPED | OUTPATIENT
Start: 2025-02-21

## 2025-02-21 RX ORDER — DIAZEPAM 5 MG/1
10 TABLET ORAL
Qty: 30 TABLET | Refills: 0 | Status: SHIPPED | OUTPATIENT
Start: 2025-02-21 | End: 2025-02-21

## 2025-02-21 RX ORDER — DIAZEPAM 5 MG/1
10 TABLET ORAL
Qty: 2 TABLET | Refills: 0 | Status: SHIPPED | OUTPATIENT
Start: 2025-02-21 | End: 2026-02-21

## 2025-02-21 NOTE — LETTER
[facility] [address] [address]  Phone: [phone]  Fax: [phone]  [facility]: [phone]    Pt Name: Leticia Law  Injury Date:  03/28/2024   Employee ID: [id number] Date of Treatment: 02/21/2025   Company: [employer]      Appointment Time: 09:45 AM Arrived: 9:54 AM   Provider: Milan Dial PA-C Time Out: 10:56 AM     Office Treatment:   1. Acute myofascial strain of lumbar region, initial encounter    2. Encounter related to worker's compensation claim    3. Dorsalgia, unspecified    4. Chronic right-sided low back pain without sciatica    5. Work related injury      Medications Ordered This Encounter   Medications    acetaminophen (TYLENOL EXTRA STRENGTH) 500 MG tablet    diazePAM (VALIUM) 5 MG tablet    methocarbamoL (ROBAXIN) 500 MG Tab      Patient Instructions: Daily home exercises/warm soaks, MRI to be scheduled once authorized, Continue Physical Therapy      Restrictions: Avoid frequent bending/lifting/twisting, No lifting/pushing/pulling more than 25 lbs     Return Appointment: 3/14/2025 at 10:00 AM    MEKA

## 2025-02-21 NOTE — PROGRESS NOTES
Subjective:      Patient ID: Leticia Law is a 50 y.o. female.    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  She reports no significant improvement since last office visit.  Says she continues to have pain about the right low back without radiation or radicular symptoms.  She has been taking Tylenol with some relief.  She is currently conducting physical therapy and is compliant with home exercises.  She is currently working light duty.  Her Injury occurred almost a year ago. MEB    Back Pain  Pertinent negatives include no numbness.     Constitution: Positive for activity change.   Musculoskeletal:  Positive for back pain.   Neurological:  Negative for numbness and tingling.     Objective:     Physical Exam  Vitals and nursing note reviewed.   Constitutional:       General: She is not in acute distress.     Appearance: Normal appearance. She is well-developed.   HENT:      Head: Normocephalic and atraumatic.      Right Ear: Hearing and external ear normal.      Left Ear: Hearing and external ear normal.      Nose: Nose normal. No nasal deformity.   Eyes:      General: Lids are normal.      Conjunctiva/sclera: Conjunctivae normal.      Right eye: Right conjunctiva is not injected.      Left eye: Left conjunctiva is not injected.   Neck:      Trachea: Trachea normal.   Cardiovascular:      Pulses: Normal pulses.           Dorsalis pedis pulses are 2+ on the right side and 2+ on the left side.        Posterior tibial pulses are 2+ on the right side and 2+ on the left side.   Pulmonary:      Effort: Pulmonary effort is normal. No respiratory distress.      Breath sounds: No stridor.   Musculoskeletal:       Cervical back: Normal and normal range of motion. No spinous process tenderness or muscular tenderness.      Thoracic back: Normal.      Lumbar back: Tenderness present. No deformity. Decreased range of motion. Negative right straight leg raise test and negative left straight leg raise test.        Back:    Skin:     General: Skin is warm and dry.      Findings: No abrasion or bruising.   Neurological:      General: No focal deficit present.      Mental Status: She is alert.      GCS: GCS eye subscore is 4. GCS verbal subscore is 5. GCS motor subscore is 6.      Sensory: Sensation is intact. No sensory deficit.      Motor: Motor function is intact. No weakness (BLE strength 5/5).      Deep Tendon Reflexes:      Reflex Scores:       Patellar reflexes are 1+ on the right side and 1+ on the left side.       Achilles reflexes are 1+ on the right side and 1+ on the left side.  Psychiatric:         Attention and Perception: She is attentive.         Speech: Speech normal.         Behavior: Behavior normal.         Thought Content: Thought content normal.        Assessment:      1. Acute myofascial strain of lumbar region, initial encounter    2. Encounter related to worker's compensation claim    3. Dorsalgia, unspecified    4. Chronic right-sided low back pain without sciatica    5. Work related injury      Plan:       Patient with chronic low back pain from a work-related injury that occurred in March of last year.  She continues to have persistent pain in the right low back.  She is compliant with physical therapy and home exercises.  I will order MRI of the L-spine for further evaluation.  Patient's weight likely contributing to persistent symptoms.  Patient to continue light duty and return to clinic in 3 weeks or sooner as needed.  Patient states she is claustrophobic.  Patient instructed to take Valium 1 hour prior to MRI.  Also instructed not to drive for 12 hours after taking Valium.  Patient verbalized  understanding and agreement.    Medications Ordered This Encounter   Medications    acetaminophen (TYLENOL EXTRA STRENGTH) 500 MG tablet     Sig: Take 2 tablets (1,000 mg total) by mouth every 6 (six) hours as needed for Pain.     Dispense:  60 tablet     Refill:  0    diazePAM (VALIUM) 5 MG tablet     Sig: Take 2 tablets (10 mg total) by mouth On call Procedure for Anxiety (Take 1 hour prior to MRI.  Do not drive for 12 hours after taking.).     Dispense:  2 tablet     Refill:  0    methocarbamoL (ROBAXIN) 500 MG Tab     Sig: Take 2 tablets (1,000 mg total) by mouth nightly as needed (muscle spasms).     Dispense:  30 tablet     Refill:  0     Patient Instructions: Daily home exercises/warm soaks, MRI to be scheduled once authorized, Continue Physical Therapy   Restrictions: Avoid frequent bending/lifting/twisting, No lifting/pushing/pulling more than 25 lbs  No follow-ups on file.

## 2025-02-24 ENCOUNTER — CLINICAL SUPPORT (OUTPATIENT)
Dept: REHABILITATION | Facility: HOSPITAL | Age: 50
End: 2025-02-24
Payer: COMMERCIAL

## 2025-02-24 DIAGNOSIS — G89.29 CHRONIC BILATERAL LOW BACK PAIN WITHOUT SCIATICA: Primary | ICD-10-CM

## 2025-02-24 DIAGNOSIS — M54.50 CHRONIC BILATERAL LOW BACK PAIN WITHOUT SCIATICA: Primary | ICD-10-CM

## 2025-02-24 PROCEDURE — 97112 NEUROMUSCULAR REEDUCATION: CPT | Mod: PN

## 2025-02-24 PROCEDURE — 97530 THERAPEUTIC ACTIVITIES: CPT | Mod: PN

## 2025-02-25 NOTE — PROGRESS NOTES
[facility]   Physical Therapy Treatment Note      Name: Leticia Law  [facility] Number: [id number]    Therapy Diagnosis:   Encounter Diagnosis   Name Primary?    Chronic bilateral low back pain without sciatica Yes     Physician: Milan Dial PA-C    Visit Date: 2/24/2025    Physician Orders: PT Eval and Treat   Medical Diagnosis from Referral:   S39.012A (ICD-10-CM) - Back strain, initial encounter   Y99.0 (ICD-10-CM) - Work related injury      Evaluation Date: 1/8/2025  Authorization Period Expiration: 01/06/2026  Plan of Care Expiration: 1/8/2025 to 03/07/2025  Visit # / Visits authorized: 11/12 FOTO: 11/12 PTA visits: 0/5  (# of No Show Appts 1/Number of Cancelled Appts 2)     Time In: 1305  Time Out: 1400  Total Appointment Time (timed & untimed codes): 55 minutes (3 TH, 1 NM)  Precautions: Wcomp, DM, hypertension    Occupation/job title:              at [employer]  Job demands:                       Bending/stooping, moving hospital room furniture, pushing/pulling  Current work status:            Light duty  Current work restrictions:   Light duty. Restrictions: No lifting/pushing/pulling more than 25 lbs, Avoid frequent bending/lifting/twisting.  Previous work status:          Full duty  Date last worked (if applicable): today.     Subjective     Pt reports: comes to OPPT after work today. Voices not feeling well overall today; was nauseated prior to starting her workshift this morning. Scheduled for lumbar MRI. Verbalized improving lumbar pain overall.   She was compliant with home exercise program.  Response to previous treatment: bilateral muscle soreness following last session.   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 Pulling/pushing Frequently Occasionally  "  2. Lifting 50# Frequently Occasionally   3. Bending/stooping frequently Occasionally      FOTO: Limitation: 74% --> 60%              Predicted limitation: 54%              CARINA: 67% --> 49%    No spasm appreciated. Mild TTP right flank area. No radicular symptoms.   Comparable sign: left rotation.   Able to push/pull 50# --> occasionally  Able to lift floor to waist 30# --> occasionally    Treatment     Neuromuscular re-education activities to improve: Balance, Coordination, Kinesthetic, Sense, Proprioception, and Posture for 15 minutes:  Seated lumbar flexion peanut 4x10  SAPD green tB 2x15 phase I-III  Seated lat pull downs CC 17# 2x15  (+) supported single-leg RDL 2x10/each    Therapeutic activities to improve functional performance for 40 minutes:  TrX squats 3x15 NP  STS 19" 3x10 10#  Pallof press greenTb 3x10/each   Deadlift 9" step 25# kettlebell 3x10  Walking laps 3x/3 rounds (intermittent t/o session)  Push sled 60# 4x25' NP    Patient Education and Home Exercises       Education provided:   - none    Home Exercises Provided: Patient instructed to cont prior HEP. Exercises were reviewed and Leticia was able to demonstrate them prior to the end of the session.  Leticia demonstrated fair  understanding of the education provided. See EMR under Patient Instructions for exercises provided during therapy sessions    Assessment   Eval: Leticia OLIVA is a 49 y.o. referred to outpatient Physical Therapy with a medical diagnosis of S39.012A (ICD-10-CM) - Back strain, initial encounter, Y99.0 (ICD-10-CM) - Work related injury. Injured her back at work lifting/flipping over a recliner chair in March 2024. Did not report initial injury to employer.  Continued to work until present time. Right flank and lumbar area pain > left. Pain aggravated by lifting and forward trunk positions of work. Clinical examination today reveals no radicular symptoms, normal BLE strength and range of motion throughout, core and lumbar weakness, " pain with loaded lumbar flexion, fear avoidance with bending/stooping, and overall moderate lumbar tissue irritability.      Interval: Continued lumbar range of motion/flexibility, lumbar stabilization and functional lifting training program with good tolerance. More fatigued today overall but tolerated all activities with no increase in back pain.     The patient's current job specific task deficits include the following: bending, stooping, lifting, walking, pushing/pulling, reaching overhead, working overhead.     Leticia is making progress towards meeting her goals.     Patient prognosis is: Good.   Rehab potential is: Good    Pt will continue to benefit from skilled Physical Therapy interventions in order to address the deficits listed in the problem list box on initial evaluation, provide education, and to address the musculoskeletal limitations and work-related functional deficits for their job as a .  Pt's spiritual, cultural and educational needs considered and pt agreeable to plan of care and goals.     Anticipated barriers to physical therapy: transportation and chronicity of current injury     Goals:   Short Terms Goals: 3 weeks     Goal  Progress  Date    (1)  Patient will be I with HOME EXERCISE PROGRAM  Initial, Not Met Met 2/24/2025      (2)  Patient will demonstrate standing forward bending x 10 reps without pain  Initial, Not Met   Met 02/19/2025   (3)   Patient will demonstrate ability to push/pull 50# without lumbar pain  Initial, Not Met  Met 2/24/2025         Long Term Goals: 6-8 weeks     Goal  Progress  Date    (1)  Patient will demonstrate ability to lift 40# knees to chest without lumbar pain Initial, Not Met      (2)   Patient will report >10% improvement in CARINA score.   Initial, Not Met  Met 2/24/2025     (3)   Patient will report no acute exacerbations of lumbar pain with modified/light dut work activities.   Initial, Not Met           Plan   Updated Certification Period:  2/24/2025 to 04/08/2025  Recommended Treatment Plan: 3 times per week for 6 weeks: Electrical Stimulation IFC, Manual Therapy, Moist Heat/ Ice, Neuromuscular Re-ed, Self Care, Therapeutic Activities, and Therapeutic Exercise  Other Recommendations: IASJAUN, DENISE    Upon discharge from further skilled PT/OT, it will determined if the need for a work conditioning program or Functional Capacity Evaluation is required to allow the injured worker to return to work with full potential achieved, continued improvement with body mechanics with advanced functional activities, and further prevention of future work-related injuries.     Nathan Weston, PT, DPT, OCS  2/25/2025

## 2025-02-26 ENCOUNTER — CLINICAL SUPPORT (OUTPATIENT)
Dept: REHABILITATION | Facility: HOSPITAL | Age: 50
End: 2025-02-26
Payer: COMMERCIAL

## 2025-02-26 DIAGNOSIS — M54.50 CHRONIC BILATERAL LOW BACK PAIN WITHOUT SCIATICA: Primary | ICD-10-CM

## 2025-02-26 DIAGNOSIS — G89.29 CHRONIC BILATERAL LOW BACK PAIN WITHOUT SCIATICA: Primary | ICD-10-CM

## 2025-02-26 PROCEDURE — 97530 THERAPEUTIC ACTIVITIES: CPT | Mod: PN,CQ

## 2025-02-26 PROCEDURE — 97112 NEUROMUSCULAR REEDUCATION: CPT | Mod: PN,CQ

## 2025-02-26 NOTE — PROGRESS NOTES
[facility]   Physical Therapy Treatment Note      Name: Leticia Law  [facility] Number: [id number]    Therapy Diagnosis:   Encounter Diagnosis   Name Primary?    Chronic bilateral low back pain without sciatica Yes       Physician: Milan Dial PA-C    Visit Date: 2/26/2025    Physician Orders: PT Eval and Treat   Medical Diagnosis from Referral:   S39.012A (ICD-10-CM) - Back strain, initial encounter   Y99.0 (ICD-10-CM) - Work related injury      Evaluation Date: 1/8/2025  Authorization Period Expiration: 01/06/2026  Plan of Care Expiration: 1/8/2025 to 03/07/2025  Visit # / Visits authorized: 12/12 FOTO: 13/12 PTA visits: 1/5  (# of No Show Appts 1/Number of Cancelled Appts 2)     Time In: 1105  Time Out: 1155  Total Appointment Time (timed & untimed codes): 50 minutes (3 TH, 1 NM)  Precautions: Wcomp, DM, hypertension    Occupation/job title:              at [employer]  Job demands:                       Bending/stooping, moving hospital room furniture, pushing/pulling  Current work status:            Light duty  Current work restrictions:   Light duty. Restrictions: No lifting/pushing/pulling more than 25 lbs, Avoid frequent bending/lifting/twisting.  Previous work status:          Full duty  Date last worked (if applicable): today.     Subjective     Pt reports: Feeling better since last treatment session. Scheduled for lumbar MRI. Verbalized improving lumbar pain overall.   She was compliant with home exercise program.  Response to previous treatment: bilateral muscle soreness following last session.   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 Pulling/pushing Frequently Occasionally   2. Lifting 50# Frequently Occasionally   3. Bending/stooping frequently Occasionally  "     FOTO: Limitation: 74% --> 60%              Predicted limitation: 54%              CARINA: 67% --> 49%    No spasm appreciated. Mild TTP right flank area. No radicular symptoms.   Comparable sign: left rotation.   Able to push/pull 50# --> occasionally  Able to lift floor to waist 30# --> occasionally    Treatment     Neuromuscular re-education activities to improve: Balance, Coordination, Kinesthetic, Sense, Proprioception, and Posture for 10 minutes:  Seated lumbar flexion peanut 4x10  SAPD green tB 2x15 phase I-III  Seated lat pull downs CC 17# 2x15 OOT  supported single-leg RDL 2x10/each OOT    Therapeutic activities to improve functional performance for 40 minutes:  TrX squats 3x15 NP  STS 19" 3x10 10#  Pallof press greenTb 3x10/each   Deadlift 9" step 30# kettlebell 3x10  Walking laps 3x/3 rounds (intermittent t/o session)  Push/pull sled 60# 4x25'    Patient Education and Home Exercises       Education provided:   - none    Home Exercises Provided: Patient instructed to cont prior HEP. Exercises were reviewed and Leticia was able to demonstrate them prior to the end of the session.  Leticia demonstrated fair  understanding of the education provided. See EMR under Patient Instructions for exercises provided during therapy sessions    Assessment   Eval: Leticia OLIVA is a 49 y.o. referred to outpatient Physical Therapy with a medical diagnosis of S39.012A (ICD-10-CM) - Back strain, initial encounter, Y99.0 (ICD-10-CM) - Work related injury. Injured her back at work lifting/flipping over a recliner chair in March 2024. Did not report initial injury to employer.  Continued to work until present time. Right flank and lumbar area pain > left. Pain aggravated by lifting and forward trunk positions of work. Clinical examination today reveals no radicular symptoms, normal BLE strength and range of motion throughout, core and lumbar weakness, pain with loaded lumbar flexion, fear avoidance with bending/stooping, and " overall moderate lumbar tissue irritability.      Interval: Continued lumbar range of motion/flexibility, lumbar stabilization and functional lifting training program with good tolerance. Able to progress intensity of dead lift and add pull direction with sled exercise with appropriate fatigue and no adverse response.     The patient's current job specific task deficits include the following: bending, stooping, lifting, walking, pushing/pulling, reaching overhead, working overhead.     Leticia is making progress towards meeting her goals.     Patient prognosis is: Good.   Rehab potential is: Good    Pt will continue to benefit from skilled Physical Therapy interventions in order to address the deficits listed in the problem list box on initial evaluation, provide education, and to address the musculoskeletal limitations and work-related functional deficits for their job as a .  Pt's spiritual, cultural and educational needs considered and pt agreeable to plan of care and goals.     Anticipated barriers to physical therapy: transportation and chronicity of current injury     Goals:   Short Terms Goals: 3 weeks     Goal  Progress  Date    (1)  Patient will be I with HOME EXERCISE PROGRAM  Initial, Not Met Met 2/26/2025      (2)  Patient will demonstrate standing forward bending x 10 reps without pain  Initial, Not Met   Met 02/19/2025   (3)   Patient will demonstrate ability to push/pull 50# without lumbar pain  Initial, Not Met  Met 2/26/2025         Long Term Goals: 6-8 weeks     Goal  Progress  Date    (1)  Patient will demonstrate ability to lift 40# knees to chest without lumbar pain Initial, Not Met      (2)   Patient will report >10% improvement in CARINA score.   Initial, Not Met  Met 2/26/2025     (3)   Patient will report no acute exacerbations of lumbar pain with modified/light dut work activities.   Initial, Not Met           Plan   Updated Certification Period: 2/26/2025 to 04/08/2025  Recommended  Treatment Plan: 3 times per week for 6 weeks: Electrical Stimulation IFC, Manual Therapy, Moist Heat/ Ice, Neuromuscular Re-ed, Self Care, Therapeutic Activities, and Therapeutic Exercise  Other Recommendations: IASJAUN, FDN    Upon discharge from further skilled PT/OT, it will determined if the need for a work conditioning program or Functional Capacity Evaluation is required to allow the injured worker to return to work with full potential achieved, continued improvement with body mechanics with advanced functional activities, and further prevention of future work-related injuries.     Andreina Quarles, PTA  2/26/2025

## 2025-02-27 ENCOUNTER — HOSPITAL ENCOUNTER (OUTPATIENT)
Dept: RADIOLOGY | Facility: HOSPITAL | Age: 50
Discharge: HOME | End: 2025-02-27
Attending: PHYSICIAN ASSISTANT
Payer: MEDICARE

## 2025-02-27 DIAGNOSIS — S39.012A ACUTE MYOFASCIAL STRAIN OF LUMBAR REGION, INITIAL ENCOUNTER: ICD-10-CM

## 2025-02-27 DIAGNOSIS — M54.9 DORSALGIA, UNSPECIFIED: ICD-10-CM

## 2025-02-27 PROCEDURE — 72148 MRI LUMBAR SPINE W/O DYE: CPT | Mod: TC

## 2025-02-27 PROCEDURE — 72148 MRI LUMBAR SPINE W/O DYE: CPT | Mod: 26,,, | Performed by: RADIOLOGY

## 2025-03-03 ENCOUNTER — DOCUMENTATION ONLY (OUTPATIENT)
Dept: REHABILITATION | Facility: HOSPITAL | Age: 50
End: 2025-03-03
Payer: MEDICARE

## 2025-03-03 NOTE — PROGRESS NOTES
Spoke with Occupation Health provider. Recommended holding OPPT until after MRI.  Will cancel scheduled remaining unauthorized appointments at this time.     ALIZE MartínezT

## 2025-03-11 ENCOUNTER — TELEPHONE (OUTPATIENT)
Dept: URGENT CARE | Facility: CLINIC | Age: 50
End: 2025-03-11
Payer: MEDICARE

## 2025-03-14 ENCOUNTER — OFFICE VISIT (OUTPATIENT)
Dept: URGENT CARE | Facility: CLINIC | Age: 50
End: 2025-03-14
Payer: COMMERCIAL

## 2025-03-14 VITALS
WEIGHT: 293 LBS | DIASTOLIC BLOOD PRESSURE: 61 MMHG | HEIGHT: 71 IN | RESPIRATION RATE: 18 BRPM | SYSTOLIC BLOOD PRESSURE: 101 MMHG | OXYGEN SATURATION: 98 % | BODY MASS INDEX: 41.02 KG/M2 | TEMPERATURE: 99 F | HEART RATE: 76 BPM

## 2025-03-14 DIAGNOSIS — M54.50 CHRONIC RIGHT-SIDED LOW BACK PAIN WITHOUT SCIATICA: ICD-10-CM
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
## 2025-03-14 DIAGNOSIS — Z02.6 ENCOUNTER RELATED TO WORKER'S COMPENSATION CLAIM: Primary | ICD-10-CM

## 2025-03-14 DIAGNOSIS — G89.29 CHRONIC RIGHT-SIDED LOW BACK PAIN WITHOUT SCIATICA: ICD-10-CM

## 2025-03-14 RX ORDER — ACETAMINOPHEN 500 MG
1000 TABLET ORAL EVERY 6 HOURS PRN
Qty: 60 TABLET | Refills: 0 | Status: SHIPPED | OUTPATIENT
Start: 2025-03-14 | End: 2026-03-14

## 2025-03-14 RX ORDER — METHOCARBAMOL 500 MG/1
1000 TABLET, FILM COATED ORAL NIGHTLY PRN
Qty: 30 TABLET | Refills: 0 | Status: SHIPPED | OUTPATIENT
Start: 2025-03-14

## 2025-03-14 NOTE — PROGRESS NOTES
Subjective:      Patient ID: Leticia Law is a 50 y.o. female.    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       Provider note:   Patient presents for follow-up chronic right-sided back pain that started almost a year ago from a work-related injury.  She reports no significant improvement since last office visit.  Says she has right-sided back pain 10/10, worse with bending and lifting activities.  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  Says work increases pain.  Says she feels better when she gets home takes Tylenol and rests.  Patient had an MRI since last office visit.  She reports intermittent pain in the right anterior leg that started just within a few weeks.  She has never had any radicular symptoms until now. MEB    Back Pain  Pertinent negatives include no numbness.     Constitution: Positive for activity change.   Musculoskeletal:  Positive for back pain.   Neurological:  Negative for numbness and tingling.     Objective:     Physical Exam  Vitals and nursing note reviewed.   Constitutional:       General: She is not in acute distress.     Appearance: Normal appearance. She is well-developed.   HENT:      Head: Normocephalic and atraumatic.      Right Ear: Hearing and external ear normal.      Left Ear: Hearing and external ear normal.      Nose: Nose normal. No nasal deformity.   Eyes:      General: Lids are normal.      Conjunctiva/sclera: Conjunctivae normal.      Right eye: Right conjunctiva is not injected.      Left eye: Left conjunctiva is not injected.   Neck:      Trachea: Trachea normal.   Cardiovascular:      Pulses: Normal  pulses.           Dorsalis pedis pulses are 2+ on the right side and 2+ on the left side.        Posterior tibial pulses are 2+ on the right side and 2+ on the left side.   Pulmonary:      Effort: Pulmonary effort is normal. No respiratory distress.      Breath sounds: No stridor.   Musculoskeletal:      Cervical back: Normal and normal range of motion. No spinous process tenderness or muscular tenderness.      Thoracic back: Tenderness present.      Lumbar back: Tenderness present. No deformity. Decreased range of motion. Negative right straight leg raise test and negative left straight leg raise test.        Back:       Comments: Tender to light touch right thoracic and lumbar region.   Skin:     General: Skin is warm and dry.      Findings: No abrasion or bruising.   Neurological:      General: No focal deficit present.      Mental Status: She is alert.      GCS: GCS eye subscore is 4. GCS verbal subscore is 5. GCS motor subscore is 6.      Sensory: Sensation is intact. No sensory deficit.      Motor: Motor function is intact. No weakness (BLE strength 5/5).   Psychiatric:         Attention and Perception: She is attentive.         Speech: Speech normal.         Behavior: Behavior normal.         Thought Content: Thought content normal.          MRI Lumbar Spine Without Contrast  Result Date: 2/27/2025  EXAMINATION: MRI LUMBAR SPINE WITHOUT CONTRAST CLINICAL HISTORY: Low back pain, symptoms persist with > 6wks conservative treatment; Dorsalgia, unspecified TECHNIQUE: Multiplanar, multisequence MR images were acquired from the thoracolumbar junction to the sacrum without the administration of contrast. COMPARISON: Lumbar spine radiograph dated 05/07/2018 FINDINGS: Lumbar spine vertebral body heights appear maintained.  No discrete infiltrative T1 marrow process.  Minimal grade 1 anterolisthesis at L4-L5.  Bilateral SI joint DJD with subcortical sclerosis.  Spinal cord terminus lies near L1-L2.  Partial disc  desiccation at L3-L4 and L4-L5.  Remaining visualized prevertebral and paraspinal soft tissues demonstrate no acute abnormality. T12-L1: No significant posterior disc herniation, spinal canal stenosis, or neural foraminal impingement. L2-L3: No significant posterior disc herniation, spinal canal stenosis, or neural foraminal impingement. L2-L3: Mild facet hypertrophy and flavum thickening without significant spinal canal stenosis or neural foraminal impingement. L3-L4: Mild circumferential bulge with facet hypertrophy and flavum thickening.  No significant spinal canal stenosis.  Mild neural foraminal narrowing, more-so on the left. L4-L5: Moderate facet DJD with partial uncovering of the posterior disc.  Trace facet fluid on the left.  No significant spinal canal stenosis.  Mild neural foraminal narrowing. L5-S1: Facet arthropathy without significant spinal canal stenosis.  No more than mild neural foraminal narrowing.     Lumbar spondylosis with detailed findings as above. Electronically signed by: Harshal Vincent Date:    02/27/2025 Time:    11:05      Assessment:      1. Encounter related to worker's compensation claim    2. Chronic right-sided low back pain without sciatica    3. Work related injury      Plan:       It has almost been a year since patient's injury.  She continues to have persistent right-sided back pain.  I will refer to orthopedics for 2nd opinion to see if they can offer anything at this point.  I will also order an FCE to establish patient's functionality.  Continue warm soaks, home exercises.  She may take Tylenol and Robaxin as needed for pain.  Anticipate MMI from occupational health standpoint after FCE completed.  Consider referral to pain management at next office visit.     Medications Ordered This Encounter   Medications    acetaminophen (TYLENOL EXTRA STRENGTH) 500 MG tablet     Sig: Take 2 tablets (1,000 mg total) by mouth every 6 (six) hours as needed for Pain.     Dispense:  60  tablet     Refill:  0    methocarbamoL (ROBAXIN) 500 MG Tab     Sig: Take 2 tablets (1,000 mg total) by mouth nightly as needed (muscle spasms).     Dispense:  30 tablet     Refill:  0     Patient Instructions: Attention not to aggravate affected area, Daily home exercises/warm soaks, Referral to specialist to be scheduled, once authorized   Restrictions: No lifting/pushing/pulling more than 10 lbs, Avoid frequent bending/lifting/twisting, No Prolonged standing/walking, Sit or stand as needed (Attention not to aggravate back.)  Follow up in about 5 weeks (around 4/18/2025) for Reassessment.

## 2025-03-14 NOTE — LETTER
[facility] [address] [address]  Phone: [phone]  Fax: [phone]  [facility]: [email or web address]    Pt Name: Leticia Law  Injury Date: 03/28/2024   Employee ID:  Date of First Treatment: 03/14/2025   Company: [employer] reference to record [id number] [employer]      Appointment Time: 09:45 AM Arrived: 10:00 am   Provider: Milan Dial PA-C Time Out:11;40 am      Office Treatment:   1. Encounter related to worker's compensation claim    2. Chronic right-sided low back pain without sciatica    3. Work related injury      Medications Ordered This Encounter   Medications    acetaminophen (TYLENOL EXTRA STRENGTH) 500 MG tablet    methocarbamoL (ROBAXIN) 500 MG Tab      Patient Instructions: Attention not to aggravate affected area, Daily home exercises/warm soaks, Referral to specialist to be scheduled, once authorized    Restrictions: No lifting/pushing/pulling more than 10 lbs, Avoid frequent bending/lifting/twisting, No Prolonged standing/walking, Sit or stand as needed (Attention not to aggravate back.)     Return Appointment: 4/18/2025 at 10:00 eri

## 2025-04-15 ENCOUNTER — TELEPHONE (OUTPATIENT)
Dept: URGENT CARE | Facility: CLINIC | Age: 50
End: 2025-04-15
Payer: MEDICAID

## 2025-04-15 NOTE — TELEPHONE ENCOUNTER
Called.no answer. Patient is rescheduled to 3/17/2025, 10:00 am.  [facility] is closed on Friday, 3/18/2025.    kssarina

## 2025-04-16 ENCOUNTER — TELEPHONE (OUTPATIENT)
Dept: URGENT CARE | Facility: CLINIC | Age: 50
End: 2025-04-16
Payer: MEDICAID

## 2025-04-16 NOTE — TELEPHONE ENCOUNTER
2nd call in regards to [facility] appt change from 4/18/25 to 4/17/2025 at 10:00 am no answer left gus caro

## 2025-04-17 ENCOUNTER — OFFICE VISIT (OUTPATIENT)
Dept: URGENT CARE | Facility: CLINIC | Age: 50
End: 2025-04-17
Payer: COMMERCIAL

## 2025-04-17 VITALS
HEART RATE: 78 BPM | WEIGHT: 293 LBS | BODY MASS INDEX: 41.02 KG/M2 | SYSTOLIC BLOOD PRESSURE: 97 MMHG | OXYGEN SATURATION: 97 % | TEMPERATURE: 98 F | RESPIRATION RATE: 19 BRPM | HEIGHT: 71 IN | DIASTOLIC BLOOD PRESSURE: 62 MMHG

## 2025-04-17 DIAGNOSIS — S39.012A BACK STRAIN, INITIAL ENCOUNTER: Primary | ICD-10-CM

## 2025-04-17 DIAGNOSIS — Z02.6 ENCOUNTER RELATED TO WORKER'S COMPENSATION CLAIM: ICD-10-CM
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
## 2025-04-17 DIAGNOSIS — G89.29 CHRONIC RIGHT-SIDED LOW BACK PAIN WITHOUT SCIATICA: ICD-10-CM

## 2025-04-17 DIAGNOSIS — M54.50 CHRONIC RIGHT-SIDED LOW BACK PAIN WITHOUT SCIATICA: ICD-10-CM

## 2025-04-17 PROCEDURE — 99213 OFFICE O/P EST LOW 20 MIN: CPT | Mod: S$GLB,,, | Performed by: PHYSICIAN ASSISTANT

## 2025-04-17 NOTE — LETTER
[facility] [address] [address]  Phone: [phone]  Fax: [phone]  [facility]: [email or web address]    Pt Name: Leticia Law  Injury Date:     Employee ID:  Date of First Treatment: 04/17/2025   Company: [employer] reference [employer] record [employer] [employer]      Appointment Time: 09:45 AM Arrived: 10:00 am    Provider: Milan Dial PA-C Time Out:11:00 am     Office Treatment:   1. Back strain, initial encounter    2. Encounter related to worker's compensation claim    3. Chronic right-sided low back pain without sciatica    4. Work related injury          Patient Instructions: Attention not to aggravate affected area, Daily home exercises/warm soaks    Restrictions: No lifting/pushing/pulling more than 10 lbs, Avoid frequent bending/lifting/twisting     Return Appointment: 5/22/2025 at 9:00 am Virtual Visit via My chart   Shilo

## 2025-04-17 NOTE — PROGRESS NOTES
Subjective:      Patient ID: Leticia Law is a 50 y.o. female.    Chief Complaint: Back Pain (DOI 03/28/2024 Back Shilo )    Patient's place of employment - [employer] [place]  Patient's job title - HOUSE KEEPING   Date of Injury - 3/28/2024  Body part injured - BACK  Current work status per last visit - [employer]Y  Improved, same, or worse - worse  Pain Scale right now (1-10) - 8/10  Shilo    Provider note:    Pt presents for f/u back injury that occurred just over a year ago. She reports no significant improvement since last office visit. She continues to c/o right sided mid-low back pain, worse with bending activities. She denies any radicular sxs. She states she no longer is employed with [employer] [place] as a .  She has not conducted FCE nor seen orthopedics yet. MEB    Back Pain  Pertinent negatives include no numbness.     Constitution: Positive for activity change.   Musculoskeletal:  Positive for pain and back pain.   Neurological:  Negative for numbness and tingling.     Objective:     Physical Exam  Vitals and nursing note reviewed.   Constitutional:       General: She is not in acute distress.     Appearance: Normal appearance. She is well-developed.   HENT:      Head: Normocephalic and atraumatic.      Right Ear: Hearing and external ear normal.      Left Ear: Hearing and external ear normal.      Nose: Nose normal. No nasal deformity.   Eyes:      General: Lids are normal.      Conjunctiva/sclera: Conjunctivae normal.      Right eye: Right conjunctiva is not injected.      Left eye: Left conjunctiva is not injected.   Neck:      Trachea: Trachea normal.   Cardiovascular:      Pulses:           Posterior tibial pulses are 2+ on the right side and 2+ on the left side.   Pulmonary:      Effort: Pulmonary effort is normal. No respiratory distress.      Breath sounds: No stridor.   Musculoskeletal:      Cervical back: Normal and normal range of motion. No spinous process tenderness or muscular tenderness.      Thoracic back:  Tenderness present.      Lumbar back: Tenderness present. No deformity. Decreased range of motion. Negative right straight leg raise test and negative left straight leg raise test.        Back:       Comments: Tender to light touch right thoracic and lumbar region.   Skin:     General: Skin is warm and dry.      Findings: No abrasion or bruising.   Neurological:      General: No focal deficit present.      Mental Status: She is alert.      GCS: GCS eye subscore is 4. GCS verbal subscore is 5. GCS motor subscore is 6.      Sensory: Sensation is intact. No sensory deficit.      Motor: Motor function is intact. No weakness (BLE strength 5/5).   Psychiatric:         Attention and Perception: She is attentive.         Speech: Speech normal.         Behavior: Behavior normal.         Thought Content: Thought content normal.        Assessment:      1. Back strain, initial encounter    2. Encounter related to worker's compensation claim    3. Chronic right-sided low back pain without sciatica    4. Work related injury      Plan:     Pt has scheduled appointments for FCE and orthopedics confirmed by [facility]. Pt will likely have permanent restrictions based on FCE performance.   I will continue to follow and monitor progress.      Patient Instructions: Attention not to aggravate affected area, Daily home exercises/warm soaks   Restrictions: No lifting/pushing/pulling more than 10 lbs, Avoid frequent bending/lifting/twisting  Follow up in about 5 weeks (around 5/22/2025) for Reassessment.

## 2025-04-22 ENCOUNTER — OFFICE VISIT (OUTPATIENT)
Dept: FAMILY MEDICINE | Facility: HOSPITAL | Age: 50
End: 2025-04-22
Payer: MEDICAID

## 2025-04-22 VITALS
BODY MASS INDEX: 41.02 KG/M2 | HEART RATE: 85 BPM | HEIGHT: 71 IN | OXYGEN SATURATION: 98 % | WEIGHT: 293 LBS | DIASTOLIC BLOOD PRESSURE: 72 MMHG | SYSTOLIC BLOOD PRESSURE: 119 MMHG

## 2025-04-22 DIAGNOSIS — D64.9 ANEMIA, UNSPECIFIED TYPE: ICD-10-CM

## 2025-04-22 DIAGNOSIS — I10 PRIMARY HYPERTENSION: ICD-10-CM

## 2025-04-22 DIAGNOSIS — E11.9 TYPE 2 DIABETES MELLITUS WITHOUT COMPLICATION, WITHOUT LONG-TERM CURRENT USE OF INSULIN: Primary | ICD-10-CM

## 2025-04-22 DIAGNOSIS — Z12.11 ENCOUNTER FOR SCREENING FOR MALIGNANT NEOPLASM OF COLON: ICD-10-CM

## 2025-04-22 DIAGNOSIS — Z23 NEED FOR PNEUMOCOCCAL VACCINE: ICD-10-CM

## 2025-04-22 PROCEDURE — 90677 PCV20 VACCINE IM: CPT

## 2025-04-22 PROCEDURE — 99215 OFFICE O/P EST HI 40 MIN: CPT

## 2025-04-22 RX ADMIN — PNEUMOCOCCAL 20-VALENT CONJUGATE VACCINE 0.5 ML
2.2; 2.2; 2.2; 2.2; 2.2; 2.2; 2.2; 2.2; 2.2; 2.2; 2.2; 2.2; 2.2; 2.2; 2.2; 2.2; 4.4; 2.2; 2.2; 2.2 INJECTION, SUSPENSION INTRAMUSCULAR at 01:04

## 2025-04-22 NOTE — PROGRESS NOTES
Prevnar 20 given 0.5ml given IM in left deltoid . Tolerated well. Instructed to remain in clinic x 15 to observe for adverse reactions.-DP

## 2025-04-22 NOTE — PROGRESS NOTES
[facility]    Subjective:     Leticia Law is a 50 y.o. year old female with PMHx of HTN, T2DM, HLD, obesity, depression, anxiety, and hypothyroidism who presents to clinic for follow up.     HTN: BP today 119/72. Well-controlled. Patient denies any SOB, lightheadedness, dizziness, palpitations, chest pain, or vision changes. Patient endorses compliance with medication regimen, including enalapriL-hydrochlorothiazide (VASERETIC) 10-25.     DM: Last A1c 7.5% (2/10/25); patient does not complain of numbness/tingling in bilateral feet. Patient denies polyuria, polydipsia, polyphagia. Patient endorses compliance with medication regimen, including Metformin 1000mg and Mounjaro 7.5/weekly. Denies any episodes of hypoglycemia.    Currently undergoing bariatric surgery evaluation with Senia Browne.     HCM:  Willing to do colonoscopy, has never done before. Denies any hematochezia or melena. No family hx of colon cancer.  Prevnar to be given today    Patient Active Problem List    Diagnosis Date Noted    Chronic bilateral low back pain without sciatica 2025    Morbid obesity with BMI of 50.0-59.9, adult 2024    HTN (hypertension) 10/21/2014    DM (diabetes mellitus) 10/21/2014    Hypercholesteremia 10/21/2014        Review of patient's allergies indicates:  No Known Allergies     Past Medical History:   Diagnosis Date    Depression     Diabetes mellitus     Hyperlipidemia     Hypertension     Thyroid disease     possible hypthyroid disease      Past Surgical History:   Procedure Laterality Date     SECTION      TUBAL LIGATION        Family History   Problem Relation Name Age of Onset    Breast cancer Maternal Cousin        Social History     Tobacco Use    Smoking status: Never    Smokeless tobacco: Never   Substance Use Topics    Alcohol use: Yes     Comment: OCC        Objective:     Vitals:    25 1053   BP: 119/72   Pulse: 85   SpO2: 98%   Weight: (!) 170.8 kg (376 lb 8.7 oz)  "  Height: 5' 11" (1.803 m)     Body mass index is 52.52 kg/m².    Gen: No apparent distress, well nourished and developed, appears stated age  CV: RRR, S1 and S2 present, no LE edema  Resp: CTAB, normal respiratory effort    Assessment/Plan:     Leticia Law is a 50 y.o. year old female who presents to clinic for follow up.    1. Encounter for screening for malignant neoplasm of colon  - Ambulatory referral/consult to Endo Procedure ; Future    2. Primary hypertension  - Chronic, well controlled w/ current regimen enalapriL-hydrochlorothiazide (VASERETIC) 10-25  - No changes made today. No episodes of hypotension noted.    3. Type 2 diabetes mellitus without complication, without long-term current use of insulin (Primary)  - Not controlled based on last A1c   - Pt tolerating well and has lost 20 lb since last visit. Will increase tirzepatide from 7.5 mg weekly to tirzepatide 10 mg/0.5 mL PnIj; Inject 10 mg into the skin every 7 days.  Dispense: 2 mL; Refill: 2  - Will re-check in 1 month prior to next visit Hemoglobin A1C; Future    4. Anemia, unspecified type  - Asymptomatic currently. Will obtain labs as below to further assess for etiology.  - Ferritin; Future  - Iron and TIBC; Future  - Folate; Future  - Methylmalonic Acid, Serum; Future    5. Need for pneumococcal vaccine  - pneumoc 20-catrina conj-dip cr(PF) (PREVNAR-20 (PF)) injection Syrg 0.5 mL    Follow-up: 1 month for DM    Case discussed with staff: LINSEY Israel MD  [facility], PGY-3  04/22/2025      " 2021

## 2025-04-23 NOTE — PROGRESS NOTES
I assume primary medical responsibility for this patient. I have reviewed the history, physical, and assessement & treatment plan with the resident and agree that the care is reasonable and necessary. This service has been performed by a resident without the presence of a teaching physician under the primary care exception. If necessary, an addendum of additional findings or evaluation beyond the resident documentation will be noted below.      Leonora Blanchard MD    [facility]

## 2025-04-29 PROBLEM — S39.012A BACK STRAIN: Status: ACTIVE | Noted: 2025-04-29

## 2025-04-29 PROBLEM — G89.29 CHRONIC LOW BACK PAIN: Status: ACTIVE | Noted: 2025-04-29

## 2025-04-29 PROBLEM — M54.50 CHRONIC LOW BACK PAIN: Status: ACTIVE | Noted: 2025-04-29

## 2025-05-08 ENCOUNTER — TELEPHONE (OUTPATIENT)
Dept: ENDOSCOPY | Facility: HOSPITAL | Age: 50
End: 2025-05-08
Payer: MEDICAID

## 2025-05-08 ENCOUNTER — PATIENT MESSAGE (OUTPATIENT)
Dept: ENDOSCOPY | Facility: HOSPITAL | Age: 50
End: 2025-05-08

## 2025-05-08 ENCOUNTER — CLINICAL SUPPORT (OUTPATIENT)
Dept: ENDOSCOPY | Facility: HOSPITAL | Age: 50
End: 2025-05-08
Payer: COMMERCIAL

## 2025-05-08 DIAGNOSIS — Z12.11 ENCOUNTER FOR SCREENING FOR MALIGNANT NEOPLASM OF COLON: ICD-10-CM

## 2025-05-08 DIAGNOSIS — Z12.11 SPECIAL SCREENING FOR MALIGNANT NEOPLASMS, COLON: Primary | ICD-10-CM

## 2025-05-08 NOTE — TELEPHONE ENCOUNTER
Patient is scheduled for a Colonoscopy on 6/4/25 with Dr. GLENNY Fong  Referral for procedure from PAT appointment-missed

## 2025-05-14 DIAGNOSIS — I10 PRIMARY HYPERTENSION: ICD-10-CM

## 2025-05-14 DIAGNOSIS — E03.9 HYPOTHYROIDISM, UNSPECIFIED TYPE: ICD-10-CM

## 2025-05-14 RX ORDER — ENALAPRIL MALEATE AND HYDROCHLOROTHIAZIDE 10; 25 MG/1; MG/1
1 TABLET ORAL DAILY
Qty: 90 TABLET | Refills: 3 | Status: SHIPPED | OUTPATIENT
Start: 2025-05-14 | End: 2026-05-14

## 2025-05-14 RX ORDER — LEVOTHYROXINE SODIUM 200 UG/1
200 TABLET ORAL
Qty: 30 TABLET | Refills: 2 | Status: SHIPPED | OUTPATIENT
Start: 2025-05-14 | End: 2025-08-12

## 2025-05-15 ENCOUNTER — LAB VISIT (OUTPATIENT)
Dept: LAB | Facility: HOSPITAL | Age: 50
End: 2025-05-15
Payer: COMMERCIAL

## 2025-05-15 DIAGNOSIS — D64.9 ANEMIA, UNSPECIFIED TYPE: ICD-10-CM

## 2025-05-15 DIAGNOSIS — E11.9 TYPE 2 DIABETES MELLITUS WITHOUT COMPLICATION, WITHOUT LONG-TERM CURRENT USE OF INSULIN: ICD-10-CM

## 2025-05-15 LAB
EAG (OHS): 131 MG/DL (ref 68–131)
FERRITIN SERPL-MCNC: 80.2 NG/ML (ref 20–300)
FOLATE SERPL-MCNC: 10.2 NG/ML (ref 4–24)
HBA1C MFR BLD: 6.2 % (ref 4–5.6)
IRON SATN MFR SERPL: 11 % (ref 20–50)
IRON SERPL-MCNC: 45 UG/DL (ref 30–160)
TIBC SERPL-MCNC: 400 UG/DL (ref 250–450)
TRANSFERRIN SERPL-MCNC: 270 MG/DL (ref 200–375)

## 2025-05-15 PROCEDURE — 83036 HEMOGLOBIN GLYCOSYLATED A1C: CPT

## 2025-05-15 PROCEDURE — 36415 COLL VENOUS BLD VENIPUNCTURE: CPT

## 2025-05-15 PROCEDURE — 82728 ASSAY OF FERRITIN: CPT

## 2025-05-15 PROCEDURE — 83540 ASSAY OF IRON: CPT

## 2025-05-15 PROCEDURE — 82746 ASSAY OF FOLIC ACID SERUM: CPT

## 2025-05-15 PROCEDURE — 83921 ORGANIC ACID SINGLE QUANT: CPT

## 2025-05-16 ENCOUNTER — TELEPHONE (OUTPATIENT)
Dept: FAMILY MEDICINE | Facility: HOSPITAL | Age: 50
End: 2025-05-16

## 2025-05-19 ENCOUNTER — RESULTS FOLLOW-UP (OUTPATIENT)
Dept: PRIMARY CARE CLINIC | Facility: CLINIC | Age: 50
End: 2025-05-19

## 2025-05-20 LAB — W METHYLMALONIC ACID: 0.34 UMOL/L

## 2025-05-21 ENCOUNTER — OFFICE VISIT (OUTPATIENT)
Dept: FAMILY MEDICINE | Facility: HOSPITAL | Age: 50
End: 2025-05-21
Payer: MEDICAID

## 2025-05-21 VITALS
OXYGEN SATURATION: 99 % | SYSTOLIC BLOOD PRESSURE: 96 MMHG | HEART RATE: 85 BPM | WEIGHT: 293 LBS | DIASTOLIC BLOOD PRESSURE: 63 MMHG | HEIGHT: 71 IN | BODY MASS INDEX: 41.02 KG/M2

## 2025-05-21 DIAGNOSIS — G89.29 CHRONIC BILATERAL LOW BACK PAIN WITHOUT SCIATICA: ICD-10-CM

## 2025-05-21 DIAGNOSIS — D64.9 ANEMIA, UNSPECIFIED TYPE: ICD-10-CM

## 2025-05-21 DIAGNOSIS — E11.9 TYPE 2 DIABETES MELLITUS WITHOUT COMPLICATION, WITHOUT LONG-TERM CURRENT USE OF INSULIN: Primary | ICD-10-CM

## 2025-05-21 DIAGNOSIS — E78.00 HYPERCHOLESTEREMIA: ICD-10-CM

## 2025-05-21 DIAGNOSIS — E66.01 MORBID OBESITY WITH BMI OF 50.0-59.9, ADULT: ICD-10-CM

## 2025-05-21 DIAGNOSIS — M54.50 CHRONIC BILATERAL LOW BACK PAIN WITHOUT SCIATICA: ICD-10-CM

## 2025-05-21 DIAGNOSIS — G47.33 OSA (OBSTRUCTIVE SLEEP APNEA): ICD-10-CM

## 2025-05-21 DIAGNOSIS — E03.9 HYPOTHYROIDISM, UNSPECIFIED TYPE: ICD-10-CM

## 2025-05-21 DIAGNOSIS — I10 PRIMARY HYPERTENSION: ICD-10-CM

## 2025-05-21 DIAGNOSIS — Z98.84 BARIATRIC SURGERY STATUS: ICD-10-CM

## 2025-05-21 PROCEDURE — 99213 OFFICE O/P EST LOW 20 MIN: CPT

## 2025-05-21 RX ORDER — ENALAPRIL MALEATE AND HYDROCHLOROTHIAZIDE 10; 25 MG/1; MG/1
1 TABLET ORAL DAILY
Qty: 90 TABLET | Refills: 3 | Status: SHIPPED | OUTPATIENT
Start: 2025-05-21 | End: 2026-05-21

## 2025-05-21 RX ORDER — TIRZEPATIDE 12.5 MG/.5ML
12.5 INJECTION, SOLUTION SUBCUTANEOUS
Qty: 2 ML | Refills: 0 | Status: SHIPPED | OUTPATIENT
Start: 2025-05-21 | End: 2025-06-20

## 2025-05-21 RX ORDER — LEVOTHYROXINE SODIUM 200 UG/1
200 TABLET ORAL
Qty: 30 TABLET | Refills: 2 | Status: SHIPPED | OUTPATIENT
Start: 2025-05-21 | End: 2025-08-19

## 2025-05-21 RX ORDER — FERROUS SULFATE 325(65) MG
325 TABLET ORAL EVERY OTHER DAY
Qty: 180 TABLET | Refills: 0 | Status: SHIPPED | OUTPATIENT
Start: 2025-05-21 | End: 2026-05-21

## 2025-05-21 RX ORDER — ATORVASTATIN CALCIUM 40 MG/1
40 TABLET, FILM COATED ORAL DAILY
Qty: 90 TABLET | Refills: 3 | Status: SHIPPED | OUTPATIENT
Start: 2025-05-21 | End: 2026-05-21

## 2025-05-21 NOTE — PROGRESS NOTES
"Subjective       Patient ID: Leticia Law is a 50 y.o. female who presents for Follow-up.  Leticia Law is a 50 y.o. year old female with PMHx of HTN, T2DM, HLD, obesity, depression, anxiety, and hypothyroidism who presents to clinic for follow up of her diabetes     HPI    #Diabetes  - Medications: Mounjaro 10 mg once weekly, Metformin 1000 mg daily  - Last A1c: 6.2 on 5/15/25  - Denies polyuria, polydipsia, nocturia, blurred vision, and unintentional weight loss   - Recheck A1c every 3-6 months.  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  - Pain is along the spine from the C-spine down to L-spine and radiates to the right flank  - Patient states she retained a  and the  sent her to chiropractor, a week ago  - Patient will see NSGY 7/3/25  - Patient previously went to Physical therapy for her back via workers comp; requesting physical therapy again  - Patient taking Tylenol 1000 mg BID, which helps a little    #Hx of Anemia  - Patient reports she used to have heavy periods  - LMP: 01/2025, no more bleeding since then but Ernie was heavy    #Weight loss  - Patient being worked up for Bariatric surgery, needs psychiatry clearence prior to surgery and needs more labs via bariatric surgery    #Sleep Apnea  - Patient reports she has FRANCO and was prescribed a CPAP machine; she has not received it yet.           Review of Systems   All other systems reviewed and are negative.     A comprehensive review of symptoms was completed and negative except as noted above.    Objective     BP 96/63 (BP Location: Left arm, Patient Position: Sitting)   Pulse 85   Ht 5' 11" (1.803 m)   Wt (!) 168.6 kg (371 lb 11.1 oz)   LMP 01/22/2025 (Approximate)   SpO2 99%   BMI 51.84 kg/m²      Physical Exam  Constitutional:       General: She is not in acute distress.     Appearance: Normal appearance. She is obese. She is not " ill-appearing, toxic-appearing or diaphoretic.   HENT:      Head: Normocephalic and atraumatic.      Right Ear: External ear normal.      Left Ear: External ear normal.      Nose: Nose normal.      Mouth/Throat:      Mouth: Mucous membranes are moist.      Pharynx: Oropharynx is clear.   Eyes:      General: No scleral icterus.        Right eye: No discharge.         Left eye: No discharge.      Extraocular Movements: Extraocular movements intact.      Conjunctiva/sclera: Conjunctivae normal.   Cardiovascular:      Rate and Rhythm: Normal rate and regular rhythm.      Pulses: Normal pulses.      Heart sounds: Normal heart sounds. No murmur heard.  Pulmonary:      Effort: Pulmonary effort is normal. No respiratory distress.      Breath sounds: Normal breath sounds.   Abdominal:      General: Bowel sounds are normal. There is no distension.      Palpations: Abdomen is soft.      Tenderness: There is no abdominal tenderness.   Musculoskeletal:         General: Signs of injury present. No swelling or tenderness. Normal range of motion.   Skin:     General: Skin is warm and dry.      Capillary Refill: Capillary refill takes less than 2 seconds.   Neurological:      Mental Status: She is alert and oriented to person, place, and time.        No results found for this or any previous visit (from the past 24 hours).        Assessment & Plan  Morbid obesity with BMI of 50.0-59.9, adult  Bariatric surgery status  - Patient follows with bariatric surgery; patient needing psychiatry evaluation and other lab work prior to scheduling surgery.  - Chronic condition.  - Uncontrolled.         Type 2 diabetes mellitus without complication, without long-term current use of insulin  - Chronic condition.  - Well controlled.  - Reviewed previous labs, tests, and/or imaging obtained since last visit.  - Discussed various diagnostic and treatment options with patient at this visit.  - Medications and/or medication refills as below.  - Will  increase Mounjaro every 4 weeks up to max to align with patient goals  - As patient continues to lose weight and after bariatric surgery, will need to adjust medication regimen as needed.  Orders:    tirzepatide (MOUNJARO) 12.5 mg/0.5 mL PnIj; Inject 12.5 mg into the skin every 7 days.    Primary hypertension  - Chronic condition.  - Well controlled.  - Reviewed previous labs, tests, and/or imaging obtained since last visit.  - Discussed various diagnostic and treatment options with patient at this visit.  - Medications and/or medication refills as below.  - As patient continues to lose weight and after bariatric surgery, will need to adjust medication regimen as needed.  Orders:    enalapriL-hydrochlorothiazide (VASERETIC) 10-25 mg per tablet; Take 1 tablet by mouth once daily.    Hypothyroidism, unspecified type  - Chronic condition.  - Controlled.  - Reviewed previous labs, tests, and/or imaging obtained since last visit.  - Discussed various diagnostic and treatment options with patient at this visit.  - Medications and/or medication refills as below.  - As patient continues to lose weight and after bariatric surgery, will need to adjust medication regimen as needed.  Orders:    levothyroxine (SYNTHROID) 200 MCG tablet; Take 1 tablet (200 mcg total) by mouth before breakfast.    Anemia, unspecified type  - Chronic condition.  - Moderately controlled.  - Reviewed previous labs, tests, and/or imaging obtained since last visit.  - Medications and/or medication refills as below.  - Recheck in 6 months  Orders:    ferrous sulfate (IRON) 325 mg (65 mg iron) Tab tablet; Take 1 tablet (325 mg total) by mouth every other day.    Hypercholesteremia  - Chronic condition.  - Well controlled.  - Medications and/or medication refills as below.    Orders:    atorvastatin (LIPITOR) 40 MG tablet; Take 1 tablet (40 mg total) by mouth once daily.    FRANCO (obstructive sleep apnea)  - Chronic condition.  - Uncontrolled.  - Patient  awaiting CPAP machine, advised patient to reach out to responsible party for ETA in CPAP machine.         Chronic bilateral low back pain without sciatica  - Chronic condition.  - Uncontrolled.  - As patient has retained a  to handle her case, and she is seeing NSGY and a chiropractor at the direction of her , advised patient to ask the  to direct her to someone who can prescribe PT for the case.                          Follow Up:  Follow up in about 4 weeks (around 6/18/2025) for Mounjaro titration.    _________________________________________  Joanna Martin MD, PGY-1  [facility] [place]  05/27/2025

## 2025-05-21 NOTE — ASSESSMENT & PLAN NOTE
- Chronic condition.  - Well controlled.  - Reviewed previous labs, tests, and/or imaging obtained since last visit.  - Discussed various diagnostic and treatment options with patient at this visit.  - Medications and/or medication refills as below.  - As patient continues to lose weight and after bariatric surgery, will need to adjust medication regimen as needed.  Orders:    enalapriL-hydrochlorothiazide (VASERETIC) 10-25 mg per tablet; Take 1 tablet by mouth once daily.

## 2025-05-21 NOTE — ASSESSMENT & PLAN NOTE
- Chronic condition.  - Well controlled.  - Reviewed previous labs, tests, and/or imaging obtained since last visit.  - Discussed various diagnostic and treatment options with patient at this visit.  - Medications and/or medication refills as below.  - Will increase Mounjaro every 4 weeks up to max to align with patient goals  - As patient continues to lose weight and after bariatric surgery, will need to adjust medication regimen as needed.  Orders:    tirzepatide (MOUNJARO) 12.5 mg/0.5 mL PnGeoffrey; Inject 12.5 mg into the skin every 7 days.

## 2025-05-22 ENCOUNTER — OFFICE VISIT (OUTPATIENT)
Dept: URGENT CARE | Facility: CLINIC | Age: 50
End: 2025-05-22
Payer: COMMERCIAL

## 2025-05-22 DIAGNOSIS — G89.29 CHRONIC RIGHT-SIDED LOW BACK PAIN WITHOUT SCIATICA: ICD-10-CM
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
## 2025-05-22 DIAGNOSIS — M54.50 CHRONIC RIGHT-SIDED LOW BACK PAIN WITHOUT SCIATICA: ICD-10-CM

## 2025-05-22 DIAGNOSIS — S39.012A BACK STRAIN, INITIAL ENCOUNTER: Primary | ICD-10-CM

## 2025-05-22 PROCEDURE — 98004 SYNCH AUDIO-VIDEO EST SF 10: CPT | Mod: 95,,, | Performed by: PHYSICIAN ASSISTANT

## 2025-05-22 NOTE — PROGRESS NOTES
The patient location is: [place]  The chief complaint leading to consultation is: Back pain    Visit type: audiovisual    Face to Face time with patient:   12 minutes of total time spent on the encounter, which includes face to face time and non-face to face time preparing to see the patient (eg, review of tests), Obtaining and/or reviewing separately obtained history, Documenting clinical information in the electronic or other health record, Independently interpreting results (not separately reported) and communicating results to the patient/family/caregiver, or Care coordination (not separately reported).         Each patient to whom he or she provides medical services by telemedicine is:  (1) informed of the relationship between the physician and patient and the respective role of any other health care provider with respect to management of the patient; and (2) notified that he or she may decline to receive medical services by telemedicine and may withdraw from such care at any time.    Notes:  Patient with chronic back pain from a work-related injury that occurred in March of last year.  She reports little improvement since last office visit.  Patient states that she had an FCE last month.  Patient states she has an appointment with orthopedic spine in July.  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  She is not currently working.    PATIENT WILL FOLLOW-UP WITH ORTHOPEDICS.  SHE HAS REACHED MMI FROM AN OCCUPATIONAL HEALTH PERSPECTIVE.  I HAVE PROVIDED A NOTE WITH PERMANENT RESTRICTIONS BASED ON FCE.  SHE MAY RETURN TO CLINIC AS NEEDED.    I spent a total of 12 minutes on the day of this visit.  This includes VIRTUAL time and non-face to face time preparing to see the patient (eg, review of tests), obtaining and/or reviewing separately obtained history, documenting clinical information in the electronic or other health record, independently  interpreting results and communicating results to the patient/family/caregiver, or care coordinator.

## 2025-05-22 NOTE — LETTER
[facility] [address] [address]  Phone: [phone]  Fax: [phone]  [employer] [facility]: [email or web address]    Pt Name: Leticia Law  Injury Date: 03/28/2024   Employee ID:  Date of First Treatment: 05/22/2025   Company: [employer] reference to record [id number]      Appointment Time: 08:45 AM Arrived: 09:39   Provider: Milan Dial PA-C Time Out: 09:51     Office Treatment:   1. Back strain, initial encounter    2. Chronic right-sided low back pain without sciatica    3. Work related injury       Restrictions per functional capacity evaluation.  Restrictions will be permanent.              Referred to orthopedics as scheduled.  Return to clinic as needed.

## 2025-05-26 NOTE — ASSESSMENT & PLAN NOTE
- Chronic condition.  - Well controlled.  - Medications and/or medication refills as below.    Orders:    atorvastatin (LIPITOR) 40 MG tablet; Take 1 tablet (40 mg total) by mouth once daily.

## 2025-05-26 NOTE — ASSESSMENT & PLAN NOTE
- Patient follows with bariatric surgery; patient needing psychiatry evaluation and other lab work prior to scheduling surgery.  - Chronic condition.  - Uncontrolled.

## 2025-05-26 NOTE — ASSESSMENT & PLAN NOTE
- Chronic condition.  - Uncontrolled.  - As patient has retained a  to handle her case, and she is seeing NSGY and a chiropractor at the direction of her , advised patient to ask the  to direct her to someone who can prescribe PT for the case.

## 2025-05-29 ENCOUNTER — OFFICE VISIT (OUTPATIENT)
Dept: OPTOMETRY | Facility: CLINIC | Age: 50
End: 2025-05-29
Payer: MEDICARE

## 2025-05-29 DIAGNOSIS — H04.123 DRY EYE SYNDROME OF BOTH EYES: ICD-10-CM

## 2025-05-29 DIAGNOSIS — H52.7 REFRACTIVE ERROR: ICD-10-CM

## 2025-05-29 DIAGNOSIS — E11.9 TYPE 2 DIABETES MELLITUS WITHOUT RETINOPATHY: Primary | ICD-10-CM

## 2025-05-29 DIAGNOSIS — E11.9 TYPE 2 DIABETES MELLITUS WITHOUT COMPLICATION, WITHOUT LONG-TERM CURRENT USE OF INSULIN: ICD-10-CM

## 2025-05-29 PROCEDURE — 3061F NEG MICROALBUMINURIA REV: CPT | Mod: CPTII,,, | Performed by: OPTOMETRIST

## 2025-05-29 PROCEDURE — 92004 COMPRE OPH EXAM NEW PT 1/>: CPT | Mod: S$PBB,,, | Performed by: OPTOMETRIST

## 2025-05-29 PROCEDURE — 3044F HG A1C LEVEL LT 7.0%: CPT | Mod: CPTII,,, | Performed by: OPTOMETRIST

## 2025-05-29 PROCEDURE — 99999 PR PBB SHADOW E&M-EST. PATIENT-LVL III: CPT | Mod: PBBFAC,,, | Performed by: OPTOMETRIST

## 2025-05-29 PROCEDURE — 1159F MED LIST DOCD IN RCRD: CPT | Mod: CPTII,,, | Performed by: OPTOMETRIST

## 2025-05-29 PROCEDURE — 99213 OFFICE O/P EST LOW 20 MIN: CPT | Mod: PBBFAC,PN | Performed by: OPTOMETRIST

## 2025-05-29 PROCEDURE — 92015 DETERMINE REFRACTIVE STATE: CPT | Mod: ,,, | Performed by: OPTOMETRIST

## 2025-05-29 PROCEDURE — 3066F NEPHROPATHY DOC TX: CPT | Mod: CPTII,,, | Performed by: OPTOMETRIST

## 2025-05-29 PROCEDURE — 4010F ACE/ARB THERAPY RXD/TAKEN: CPT | Mod: CPTII,,, | Performed by: OPTOMETRIST

## 2025-05-29 NOTE — PROGRESS NOTES
HPI    CC: 49 yo F presents today for diabetic eye exam. Pt reports noticeable   vision changes. Pt lost prescription glasses.    KADI: not sure    (+) Changes in vision, at both ranges  (-) Pain  (-) Irritation   (-) Itching   (-) Flashes  (+) Floaters, occasional; longstanding   (+) Glasses wearer, need updated prescription  (-) CL wearer  (-) Uses eye gtts    Does patient want a refraction today? yes    (-) Eye injury  (-) Eye surgery   (-)POHx  (-)FOHx    (+)DM  Hemoglobin A1C       Date                     Value               Ref Range             Status                02/10/2025               7.5 (H)             4.0 - 5.6 %           Final                  10/14/2024               7.1 (H)             4.7 - 5.6 %           Final                 08/05/2024               7.2 (H)             4.0 - 5.6 %           Final                 09/17/2018               6.0 (H)             4.0 - 5.6 %           Final                      05/15/2025               6.2 (H)             4.0 - 5.6 %           Final                 05/24/2023               6.3 (H)             <5.7 % of tota*       Final                   Last edited by Lashell David, OD on 5/29/2025  1:19 PM.            Assessment /Plan     For exam results, see Encounter Report.    Type 2 diabetes mellitus without retinopathy    Type 2 diabetes mellitus without complication, without long-term current use of insulin  -     Ambulatory referral/consult to Ophthalmology    Dry eye syndrome of both eyes    Refractive error      1-2. No retinopathy noted, OU. Continue proper BS control and annual diabetic eye exams. Monitor yearly.      3. Educated pt on findings. Recommended ATs TID-QID + marin/gel QHS for added lubrication and comfort. If symptoms worsen or dont improve, RTC. Monitor.      4. Updated SRx. Monitor yearly.        RTC in 1 year for annual eye exam or sooner if needed.

## 2025-06-02 ENCOUNTER — TELEPHONE (OUTPATIENT)
Dept: ENDOSCOPY | Facility: HOSPITAL | Age: 50
End: 2025-06-02
Payer: MEDICAID

## 2025-06-03 ENCOUNTER — TELEPHONE (OUTPATIENT)
Dept: ENDOSCOPY | Facility: HOSPITAL | Age: 50
End: 2025-06-03
Payer: MEDICAID

## 2025-06-17 ENCOUNTER — TELEPHONE (OUTPATIENT)
Dept: GASTROENTEROLOGY | Facility: CLINIC | Age: 50
End: 2025-06-17
Payer: MEDICARE

## 2025-06-17 NOTE — TELEPHONE ENCOUNTER
Colonoscopy Procedure Prep Instructions    Date of procedure: 08/11/2025 Arrive at: 7:00 AM  Location of Department:   [facility] [address] [place] [address]   Take the Elevators to 2nd Floor Endoscopy Procedural Area      LAST DAY Of JANET  IS 8/1/2025    As soon as possible:  ·  your prep from pharmacy and over the counter DULCOLAX LAXATIVE TABLETS            On the day before your procedure...   What You CAN do:   · You may have clear liquids ONLY-see below for list.     Liquids That Are OK to Drink:   · Water  · Sports drinks (Gatorade, Power-Aid)  · Coffee or tea (no cream or nondairy creamer)  · Clear juices without pulp (apple, white grape)  · Gelatin desserts (no fruit or toppings)  · Clear soda (sprite, coke, ginger ale)  · Chicken broth (until 12 midnight the night before procedure)    What You CANNOT do:   · Do not EAT solid food, drink milk or anything   colored red.  · Do not drink alcohol.  · Do not take oral medications within 1 hour of starting   each dose of prep.  · No gum chewing or candy morning of procedure                       Note:   · (Please disregard the insert instructions from pharmacy).  · PEG Bowel Prep is indicated for cleansing of the colon as a preparation for colonoscopy in adults.   · Be sure to tell your doctor about all the medicines you take, including prescription and non-prescription medicines, vitamins, and herbal supplements. PEG Bowel Prep may affect how other medicines work.  · Medication taken by mouth may not be absorbed properly when taken within 1 hour before the start of each dose of PEG Bowel Prep.    It is not uncommon to experience some abdominal cramping, nausea and/or vomiting when taking the prep. If you have nausea and/or vomiting while taking the prep, stop drinking for 20 to 30 minutes then continue.      How to take prep:    PEG Bowel Prep is a (2-day) prep.    One (1) bottle of prep are required for a complete  "preparation for colonoscopy. Dilute the solution concentrate as directed prior to use. You must drink water with each dose of prep, and additional water after each dose.    DOSE 1-Day Before Colonoscopy 08/10/2025     Drink at least 6 to 8 glasses of clear liquids from time you wake up until you begin your prep and then continue until bedtime to avoid dehydration.     12:00 pm (NOON) Mix your entire container of prep with lukewarm water and refrigerate. Take four (4) Dulcolax (Bisacodyl) tablets with at least 8 ounces or more of clear liquids.       6:00 pm:    You must complete Steps 1 and 2 below before going to bed:    · Step 1-Drink half the liquid in the container within one (1) hour.   · Step 2-Refrigerate the remaining half of the liquid for dose 2. See below when to begin this step.                       IMPORTANT: If you experience preparation-related symptoms (for example, nausea, bloating, or cramping), stop, or slow the rate of drinking the additional water until your symptoms decrease.    DOSE 2-Day of the Colonoscopy 08/11/2025 at 2-3 AM.    For this dose, repeat Step 1 shown above using the remaining half of the liquid prep.   You may continue drinking water/clear liquids until   4 hours before your colonoscopy.      For information about your procedure, two (2) things to view prior to colonoscopy:  1. Please watch this informational video. "It is important to watch this animated consent video prior to your arrival". If you haven't watched the video prior to arriving, you are required to watch it during admission which can causes delays.    Options for viewing:   Using a keyboard:  press and hold the control tab (Ctrl) and left mouse click to follow links.           Colonoscopy Instructional Video                                                                                   OR    o Type link address into your web browser's address " bar:  [email or web address]      2. Educational Booklet with pictures:      Colonoscopy Prep - Liquid

## 2025-06-17 NOTE — TELEPHONE ENCOUNTER
Endoscopy Scheduling Questionnaire    Are you taking any blood thinners? no       2.  Are you taking a GLP-1 Agonist? yes     If yes, name of GLP- MOUNJARO     Instructed pt to hold this medication 7 days prior to procedure. Last dose will be 8/1/2025    3.  Are you taking Adipex? no    4.  Are you on dialysis or have Kidney Disease? no    5.  Have you been diagnosed with Diverticulitis in the past three months? no    6.  Are you a diabetic? yes    7. Implantable Devices? no      8.  Do you have a history of constipation? no    9.  Previous Colonoscopy? no     If yes, did you have polyps? no     What prep did you use? N/A     How did it work? N/A    10.  Family history of colon cancer? no        Prep instructions sent to patient via MY CHART    Prep - GOLYTELY    Prep RX sent to N/A        Procedure scheduled with Dr. MELCHOR  on  8/11/2025

## 2025-06-24 ENCOUNTER — OFFICE VISIT (OUTPATIENT)
Dept: FAMILY MEDICINE | Facility: HOSPITAL | Age: 50
End: 2025-06-24

## 2025-06-24 VITALS
WEIGHT: 293 LBS | RESPIRATION RATE: 18 BRPM | HEART RATE: 60 BPM | OXYGEN SATURATION: 98 % | DIASTOLIC BLOOD PRESSURE: 54 MMHG | HEIGHT: 71 IN | SYSTOLIC BLOOD PRESSURE: 83 MMHG | BODY MASS INDEX: 41.02 KG/M2

## 2025-06-24 DIAGNOSIS — E03.9 HYPOTHYROIDISM, UNSPECIFIED TYPE: ICD-10-CM

## 2025-06-24 DIAGNOSIS — I10 PRIMARY HYPERTENSION: Primary | ICD-10-CM

## 2025-06-24 DIAGNOSIS — E11.9 TYPE 2 DIABETES MELLITUS WITHOUT COMPLICATION, WITHOUT LONG-TERM CURRENT USE OF INSULIN: ICD-10-CM

## 2025-06-24 PROCEDURE — 99214 OFFICE O/P EST MOD 30 MIN: CPT

## 2025-06-24 RX ORDER — LEVOTHYROXINE SODIUM 200 UG/1
200 TABLET ORAL
Qty: 30 TABLET | Refills: 2 | Status: SHIPPED | OUTPATIENT
Start: 2025-06-24 | End: 2025-09-22

## 2025-06-24 RX ORDER — TIRZEPATIDE 12.5 MG/.5ML
12.5 INJECTION, SOLUTION SUBCUTANEOUS
Qty: 2 ML | Refills: 1 | Status: SHIPPED | OUTPATIENT
Start: 2025-06-24 | End: 2025-08-23

## 2025-06-24 NOTE — ASSESSMENT & PLAN NOTE
- Chronic, controlled  - Medications refilled as below, patient would like to remain at 12.5 mg for now  - Will recheck A1c at 3 month jung (8/15/25)  Orders:    tirzepatide (MOUNJARO) 12.5 mg/0.5 mL PnIj; Inject 12.5 mg into the skin every 7 days.

## 2025-06-24 NOTE — PROGRESS NOTES
"Subjective       Patient ID: Leticia Law is a 50 y.o. female who presents for Follow-up.  Leticia Law is a 50 y.o. year old female with PMHx of HTN, T2DM, HLD, obesity, depression, anxiety, and hypothyroidism who presents to clinic for follow up of her diabetes     HPI    #Hypotension  - BP 83/54 in office today, patient reports that she is feeling slightly lightheaded but is not experiencing unsteadiness.   - She reports that she has been taking her BP meds and has noticed low BP at home.    #Diabetes  - Medications: Mounjaro 12.5 mg once weekly, Metformin 1000 mg daily  - Last A1c: 6.2 on 5/15/25  - Denies polyuria, polydipsia, nocturia, blurred vision, and unintentional weight loss   - Would like to remain on Mounjaro 12.5 mg for now    #Weight loss  - Patient being worked up for Bariatric surgery, needs psychiatry clearence prior to surgery and needs more labs via bariatric surgery         Review of Systems   All other systems reviewed and are negative.     A comprehensive review of symptoms was completed and negative except as noted above.    Objective     BP (!) 83/54 (BP Location: Right arm, Patient Position: Sitting)   Pulse 60   Resp 18   Ht 5' 11" (1.803 m)   Wt (!) 166.2 kg (366 lb 6.5 oz)   SpO2 98%   BMI 51.10 kg/m²      Physical Exam  Constitutional:       General: She is not in acute distress.     Appearance: Normal appearance. She is obese. She is not ill-appearing, toxic-appearing or diaphoretic.   HENT:      Head: Normocephalic and atraumatic.      Right Ear: External ear normal.      Left Ear: External ear normal.      Nose: Nose normal.      Mouth/Throat:      Mouth: Mucous membranes are moist.      Pharynx: Oropharynx is clear.   Eyes:      General: No scleral icterus.        Right eye: No discharge.         Left eye: No discharge.      Extraocular Movements: Extraocular movements intact.      Conjunctiva/sclera: Conjunctivae normal.   Cardiovascular:      Rate and Rhythm: Normal " rate and regular rhythm.      Pulses: Normal pulses.      Heart sounds: Normal heart sounds. No murmur heard.  Pulmonary:      Effort: Pulmonary effort is normal. No respiratory distress.      Breath sounds: Normal breath sounds.   Abdominal:      General: Bowel sounds are normal. There is no distension.      Palpations: Abdomen is soft.      Tenderness: There is no abdominal tenderness.   Musculoskeletal:         General: No swelling or tenderness. Normal range of motion.   Skin:     General: Skin is warm and dry.      Capillary Refill: Capillary refill takes less than 2 seconds.   Neurological:      Mental Status: She is alert and oriented to person, place, and time.        No results found for this or any previous visit (from the past 24 hours).        Assessment & Plan  Hypothyroidism, unspecified type  - Chronic, controlled  - Medications refilled as below  - Will recheck TSH in August with A1c.  Orders:    levothyroxine (SYNTHROID) 200 MCG tablet; Take 1 tablet (200 mcg total) by mouth before breakfast.    Type 2 diabetes mellitus without complication, without long-term current use of insulin  - Chronic, controlled  - Medications refilled as below, patient would like to remain at 12.5 mg for now  - Will recheck A1c at 3 month jung (8/15/25)  Orders:    tirzepatide (MOUNJARO) 12.5 mg/0.5 mL PnIj; Inject 12.5 mg into the skin every 7 days.    Primary hypertension  - Chronic, controlled, resolving  - Patient with HYPOtension in office today  - Stopped patient's Enalapril-HCTZ   - Will recheck BP at next visit.                     Follow Up:  No follow-ups on file.    _________________________________________  Joanna Martin MD, PGY-1  [facility] - [place]  07/06/2025

## 2025-07-06 NOTE — ASSESSMENT & PLAN NOTE
- Chronic, controlled, resolving  - Patient with HYPOtension in office today  - Stopped patient's Enalapril-HCTZ   - Will recheck BP at next visit.

## 2025-07-15 ENCOUNTER — HOSPITAL ENCOUNTER (EMERGENCY)
Facility: HOSPITAL | Age: 50
Discharge: HOME | End: 2025-07-15
Attending: STUDENT IN AN ORGANIZED HEALTH CARE EDUCATION/TRAINING PROGRAM
Payer: MEDICARE

## 2025-07-15 VITALS
WEIGHT: 293 LBS | HEART RATE: 76 BPM | TEMPERATURE: 98 F | HEIGHT: 72 IN | BODY MASS INDEX: 39.68 KG/M2 | DIASTOLIC BLOOD PRESSURE: 85 MMHG | SYSTOLIC BLOOD PRESSURE: 130 MMHG | OXYGEN SATURATION: 97 % | RESPIRATION RATE: 19 BRPM

## 2025-07-15 DIAGNOSIS — K11.21 ACUTE PAROTITIS: Primary | ICD-10-CM

## 2025-07-15 PROCEDURE — 86735 MUMPS ANTIBODY: CPT | Mod: ER | Performed by: STUDENT IN AN ORGANIZED HEALTH CARE EDUCATION/TRAINING PROGRAM

## 2025-07-15 PROCEDURE — 99281 EMR DPT VST MAYX REQ PHY/QHP: CPT | Mod: ER

## 2025-07-15 NOTE — DISCHARGE INSTRUCTIONS
As we discussed, utilize warm compresses, massaging, and salivary inducing candies.  Continue isolation until test results. Return for any worsening or changing of condition.

## 2025-07-15 NOTE — ED PROVIDER NOTES
ED Provider Note - 7/15/2025    History     Chief Complaint   Patient presents with    Tinnitus     Pt reports facial swelling, nasal drainage, and ringing in ears X 2 days. Ibuprofen last night.        HPI     Leticia Law is a 50 y.o. year old female with past medical and surgical history as seen below, presenting with chief complaint of swelling to sides of face. Worsening over the past 2 days. No difficulty swallowing or breathing. Ear pressure intermittently. No purulent drainage. Massaging areas help.        Past Medical History:   Diagnosis Date    Depression     Diabetes mellitus     Hyperlipidemia     Hypertension     Thyroid disease     possible hypthyroid disease     Past Surgical History:   Procedure Laterality Date     SECTION      TUBAL LIGATION           Family History   Problem Relation Name Age of Onset    Breast cancer Maternal Cousin       Social History[1]  Social Drivers of Health with Concerns     Alcohol Use: Not on file   Financial Resource Strain: Not on file   Food Insecurity: Not on file   Transportation Needs: Not on file   Physical Activity: Not on file   Stress: Not on file   Housing Stability: Not on file   Utilities: Not on file   Health Literacy: Not on file   Social Isolation: Not on file      Review of patient's allergies indicates:  No Known Allergies    Review of Systems     A full Review of Systems (ROS) was performed and was negative unless otherwise stated in the HPI.      Physical Exam     Vitals:    07/15/25 1440   BP: 130/85   Pulse: 76   Resp: 19   Temp: 97.6 °F (36.4 °C)   SpO2: 97%   Weight: (!) 166 kg (366 lb)   Height: 6' (1.829 m)        Physical Exam    Nursing note and vitals reviewed.  Constitutional: She appears well-developed and well-nourished.   HENT:   Head: Normocephalic and atraumatic.   Swelling to both parotid glands. Nontender.   Eyes: Conjunctivae and EOM are normal.   Cardiovascular:  Normal rate, regular rhythm and intact distal pulses.            Pulmonary/Chest: Breath sounds normal. No respiratory distress.     Neurological: She is alert and oriented to person, place, and time. No cranial nerve deficit. Gait normal.   Skin: Skin is warm and dry.   Psychiatric: She has a normal mood and affect. Her behavior is normal. Thought content normal.         Lab Results- Independently reviewed by myself      Labs Reviewed   MUMPS VIRUS ANTIBODIES, IGG AND IGM           Imaging     Imaging Results    None                    ED Course         Procedures         Orders Placed This Encounter    Mumps Virus Antibodies, IgG and IgM                      Medical Decision Making       The patient's list of active medical problems, social history, medications, and allergies as documented per RN staff has been reviewed.           Medical Decision Making  *   Clinical presentation of bilateral parotid swelling prompted evaluation for parotitis; labs ordered to confirm etiology given possibility of infectious vs. non-infectious causes.  *   Alternative diagnoses considered included sialadenitis, salivary duct stone, and tumor; however, these were deemed less likely given lack of purulent drainage and the bilateral nature of the swelling.  *   Discharge appropriate given patient's stable vital signs, lack of respiratory distress or difficulty swallowing, and ability to follow up with primary care physician.  *   advised isolation until testing results      Amount and/or Complexity of Data Reviewed  Labs: ordered.                    ED Prescriptions    None           Clinical Impression       Follow-up Information       Follow up With Specialties Details Why Contact Info    Joanna Martin MD Family Medicine   [address] [place] [address] [phone]      [facility] - Emergency Dept Emergency Medicine Go to  As needed, If symptoms worsen [address] [facility]  Emergency Department  [facility] [id number] [phone]            Referrals:  No orders of the  defined types were placed in this encounter.      Disposition   ED Disposition Condition    Discharge Stable              Final diagnoses:  [K11.21] Acute parotitis (Primary)        Johnny Martinez MD        07/16/2025          DISCLAIMER: This note was prepared with [employer] voice recognition transcription software. Garbled syntax, mangled pronouns, and other bizarre constructions may be attributed to that software system.           [1]   Social History  Tobacco Use    Smoking status: Never    Smokeless tobacco: Never   Substance Use Topics    Alcohol use: Yes     Comment: OCC    Drug use: No        Johnny Martinez MD  07/16/25 1107

## 2025-07-16 DIAGNOSIS — E11.9 TYPE 2 DIABETES MELLITUS WITHOUT COMPLICATION, WITHOUT LONG-TERM CURRENT USE OF INSULIN: ICD-10-CM

## 2025-07-16 RX ORDER — TIRZEPATIDE 12.5 MG/.5ML
12.5 INJECTION, SOLUTION SUBCUTANEOUS
Qty: 2 ML | Refills: 1 | Status: SHIPPED | OUTPATIENT
Start: 2025-07-16 | End: 2025-09-14

## 2025-07-22 DIAGNOSIS — E11.9 TYPE 2 DIABETES MELLITUS WITHOUT COMPLICATION, WITHOUT LONG-TERM CURRENT USE OF INSULIN: ICD-10-CM

## 2025-07-22 LAB
MUV IGG SER IA-ACNC: 4.8
MUV IGG SER QL IA: POSITIVE
MUV IGM SER IA-ACNC: 0.1 (ref 0–0.79)
MUV IGM SER QL IA: NEGATIVE

## 2025-07-22 RX ORDER — TIRZEPATIDE 12.5 MG/.5ML
12.5 INJECTION, SOLUTION SUBCUTANEOUS
Qty: 2 ML | Refills: 1 | Status: SHIPPED | OUTPATIENT
Start: 2025-07-22 | End: 2025-09-20

## 2025-08-07 ENCOUNTER — TELEPHONE (OUTPATIENT)
Dept: ENDOSCOPY | Facility: HOSPITAL | Age: 50
End: 2025-08-07
Payer: MEDICARE

## 2025-08-07 NOTE — TELEPHONE ENCOUNTER
Left message for patient to call the department at [phone] to confirm procedure appt for August 11, 2025 and arrival time of 0715

## 2025-08-08 ENCOUNTER — TELEPHONE (OUTPATIENT)
Dept: ENDOSCOPY | Facility: HOSPITAL | Age: 50
End: 2025-08-08
Payer: MEDICARE

## 2025-08-08 NOTE — TELEPHONE ENCOUNTER
Left message instructing patient to call dept @ [phone] between 8am-4pm.    Arrival time to be given @ 0715.  (Message sent via [facility] portal)

## 2025-08-19 ENCOUNTER — TELEPHONE (OUTPATIENT)
Dept: ENDOSCOPY | Facility: HOSPITAL | Age: 50
End: 2025-08-19
Payer: MEDICARE